# Patient Record
Sex: FEMALE | Race: WHITE | NOT HISPANIC OR LATINO | Employment: OTHER | URBAN - METROPOLITAN AREA
[De-identification: names, ages, dates, MRNs, and addresses within clinical notes are randomized per-mention and may not be internally consistent; named-entity substitution may affect disease eponyms.]

---

## 2017-01-10 ENCOUNTER — OFFICE VISIT (OUTPATIENT)
Dept: CARDIOLOGY | Facility: CLINIC | Age: 72
End: 2017-01-10

## 2017-01-10 VITALS
BODY MASS INDEX: 45.69 KG/M2 | HEART RATE: 59 BPM | DIASTOLIC BLOOD PRESSURE: 84 MMHG | HEIGHT: 61 IN | SYSTOLIC BLOOD PRESSURE: 178 MMHG | WEIGHT: 242 LBS

## 2017-01-10 DIAGNOSIS — I10 ESSENTIAL HYPERTENSION: ICD-10-CM

## 2017-01-10 DIAGNOSIS — E78.5 DYSLIPIDEMIA: ICD-10-CM

## 2017-01-10 DIAGNOSIS — G47.30 SLEEP APNEA, UNSPECIFIED TYPE: ICD-10-CM

## 2017-01-10 DIAGNOSIS — I65.21 STENOSIS OF RIGHT CAROTID ARTERY: ICD-10-CM

## 2017-01-10 DIAGNOSIS — I25.10 CORONARY ARTERY DISEASE INVOLVING NATIVE CORONARY ARTERY OF NATIVE HEART WITHOUT ANGINA PECTORIS: Primary | ICD-10-CM

## 2017-01-10 PROCEDURE — 93000 ELECTROCARDIOGRAM COMPLETE: CPT | Performed by: INTERNAL MEDICINE

## 2017-01-10 PROCEDURE — 99214 OFFICE O/P EST MOD 30 MIN: CPT | Performed by: INTERNAL MEDICINE

## 2017-01-10 RX ORDER — HYDROCHLOROTHIAZIDE 12.5 MG/1
12.5 CAPSULE, GELATIN COATED ORAL DAILY
Qty: 90 CAPSULE | Refills: 3 | Status: SHIPPED | OUTPATIENT
Start: 2017-01-10 | End: 2017-05-24 | Stop reason: SINTOL

## 2017-01-10 RX ORDER — LOSARTAN POTASSIUM 100 MG/1
50 TABLET ORAL DAILY
Qty: 90 TABLET | Refills: 3 | Status: SHIPPED | OUTPATIENT
Start: 2017-01-10 | End: 2018-01-16

## 2017-01-10 NOTE — PROGRESS NOTES
Date of Office Visit: 01/10/2017  Encounter Provider: Anastasiia Callahan MD  Place of Service: [facility] CARDIOLOGY  Patient Name: Julee King  :1945      Patient ID:  Julee King is a 71 y.o. female is here for  followup for CAD.       History of Present Illness    She came in with midsternal chest pain 2014 to [facility]. She had known history  of hypertension, hyperlipidemia, and had had a history of mild obstructive sleep apnea,  but was not using a CPAP machine. She was exercising at the [facility] 3 times a week and when  she would get on the treadmill she would notice this chest tightness. When we saw her in  the chest pain unit her blood pressure was a bit elevated. We ordered a couple of studies.  She had a 2-D echocardiogram with Doppler done 2014 showing ejection fraction of 65%  with grade I diastolic dysfunction and mild left ventricular hypertrophy. She also had a  PET stress study, which was done 2014, which showed no evidence of ischemia. We then  made a referral to the [facility] at [facility]. She was found  to have obstructive sleep apnea, which was moderate to severe, with recommendation of a  CPAP, which she is currently using. She says she is having some difficulty tolerating it,  but she is going to try to stick with it.      She then presented to the hospital 2016 with chest pain and pressure. She was severely anemic and found to have gastrointestinal bleeding. She did receive an transfusions. Her troponin was elevated at 4. Her echocardiogram showed left ventricular ejection fraction of 33% with mild to moderate mitral insufficiency. She went on to have a cardiac catheterization done which showed 80% distal left main stenosis, 60% distal first obtuse marginal stenosis, 30% ramus mid vessel stenosis, 30% proximal LAD stenosis, 100% occluded first diagonal branch, 70% mid LAD stenosis, 100% proximal RCA  stenosis and 95% distal RCA stenosis. She went on to have coronary bypass grafting 1/22/2016. She received LIMA to LAD, SVG to obtuse marginal 1, SVG to obtuse marginal 2 and SVG to LV branch. She also had endoscopy Dr. Senior done 1/24/2016 showing small hiatal hernia, normal stomach and duodenal. She still needs a lower endoscopy done at this time.     Her carotid study done 1/25/2016 shows 16-49% stenosis of left internal carotid artery. The right internal carotid artery is patent.     She sees Dr. Galaviz for LOVE. She has a colonoscopy 6/2016 with Dr. Rivera and it was good.      She is overall doing well.  She has notices some left lower extremity edema, and sometimes it gets sore.  She is not exercising.  She has had no tachycardia, dizziness or syncope.  She has no real difficulty breathing except for when she really pushes herself and she has no exertional chest tightness.  She has had no syncope or falls.  She has had no further gastrointestinal bleeding.  Her blood pressure is quite high today.  She has not seen Dr. Lee recently, but is due to see him.          Past Medical History   Diagnosis Date   • Anemia    • Bronchitis 05/2016   • Chest pain    • Dyslipidemia    • Hypertension    • Hypocalcemia    • Hypocalcemia    • Hypokalemia    • Obesity    • LOVE (obstructive sleep apnea)    • Palpitations    • Sleep apnea          Past Surgical History   Procedure Laterality Date   • Cholecystectomy     • Other surgical history       KNEE REPLACEMENT   • Other surgical history       TREATMENT OF ANKLE FRACTURE   • Other surgical history       TREATMENT FRACTURE OF THE HAND   • Coronary artery bypass graft  02/28/2016     CABG x4  Dr Monroy   • Bypass graft     • Colonoscopy N/A 6/8/2016     Procedure: COLONOSCOPY to cecum with cold biopsy polypectomies;  Surgeon: Alvarado Rivera MD;  Location: [facility] ENDOSCOPY;  Service:    • Endoscopy N/A 6/8/2016     Procedure: ESOPHAGOGASTRODUODENOSCOPY with biopsy;  Surgeon:  Alvarado Rivera MD;  Location: [facility] ENDOSCOPY;  Service:        Current Outpatient Prescriptions on File Prior to Visit   Medication Sig Dispense Refill   • albuterol (PROVENTIL HFA;VENTOLIN HFA) 108 (90 BASE) MCG/ACT inhaler Inhale 2 puffs every 4 (four) hours as needed for wheezing. 1 inhaler 11   • aspirin 81 MG tablet Take 1 tablet by mouth daily.     • atorvastatin (LIPITOR) 40 MG tablet Take 1 tablet by mouth daily. 90 tablet 3   • carvedilol (COREG) 12.5 MG tablet Take 1 tablet by mouth 2 (two) times a day. 180 tablet 3   • losartan (COZAAR) 50 MG tablet Take 1 tablet by mouth Daily. 90 tablet 3   • pantoprazole (PROTONIX) 40 MG EC tablet Take 40 mg by mouth daily.       No current facility-administered medications on file prior to visit.        Social History     Social History   • Marital status:      Spouse name: N/A   • Number of children: N/A   • Years of education: N/A     Occupational History   • Not on file.     Social History Main Topics   • Smoking status: Never Smoker   • Smokeless tobacco: Never Used      Comment: caffeine use   • Alcohol use Yes      Comment: OCCASIONALLY   • Drug use: No      Comment: CAFFEINE USE    • Sexual activity: Not on file     Other Topics Concern   • Not on file     Social History Narrative           Review of Systems   Constitution: Negative.   HENT: Negative for congestion and headaches.    Eyes: Negative for vision loss in left eye and vision loss in right eye.   Respiratory: Negative.  Negative for cough, hemoptysis, shortness of breath, sleep disturbances due to breathing, snoring, sputum production and wheezing.    Endocrine: Negative.    Hematologic/Lymphatic: Negative.    Skin: Negative for poor wound healing and rash.   Musculoskeletal: Negative for falls, gout, muscle cramps and myalgias.   Gastrointestinal: Negative for abdominal pain, diarrhea, dysphagia, hematemesis, melena, nausea and vomiting.   Neurological: Negative for excessive daytime  "sleepiness, dizziness, light-headedness, loss of balance, seizures and vertigo.   Psychiatric/Behavioral: Negative for depression and substance abuse. The patient is not nervous/anxious.        Procedures    ECG 12 Lead  Date/Time: 1/10/2017 12:43 PM  Performed by: AIDEN CROWE  Authorized by: AIDEN CROWE   Comparison: compared with previous ECG   Similar to previous ECG  Rhythm: sinus rhythm  T depression: V3, V4, V5 and V6  Clinical impression: abnormal ECG               Objective:      Vitals:    01/10/17 1207   BP: 178/84   BP Location: Right arm   Patient Position: Sitting   Pulse: 59   Weight: 242 lb (110 kg)   Height: 61\" (154.9 cm)     Body mass index is 45.73 kg/(m^2).    Physical Exam   Constitutional: She is oriented to person, place, and time. She appears well-developed and well-nourished. No distress.   obese   HENT:   Head: Normocephalic and atraumatic.   Eyes: Conjunctivae are normal. No scleral icterus.   Neck: Neck supple. No JVD present. Carotid bruit is not present. No thyromegaly present.   Cardiovascular: Normal rate, regular rhythm, S1 normal, S2 normal, normal heart sounds and intact distal pulses.   No extrasystoles are present. PMI is not displaced.  Exam reveals no gallop.    No murmur heard.  Pulses:       Carotid pulses are 2+ on the right side, and 2+ on the left side.       Radial pulses are 2+ on the right side, and 2+ on the left side.        Dorsalis pedis pulses are 2+ on the right side, and 2+ on the left side.        Posterior tibial pulses are 2+ on the right side, and 2+ on the left side.   1+ LLE edema   Pulmonary/Chest: Effort normal and breath sounds normal. No respiratory distress. She has no wheezes. She has no rhonchi. She has no rales. She exhibits no tenderness.   Abdominal: Soft. Bowel sounds are normal. She exhibits no distension, no abdominal bruit and no mass. There is no tenderness.   Musculoskeletal: She exhibits no edema or deformity. "   Lymphadenopathy:     She has no cervical adenopathy.   Neurological: She is alert and oriented to person, place, and time. No cranial nerve deficit.   Skin: Skin is warm and dry. No rash noted. She is not diaphoretic. No cyanosis. No pallor. Nails show no clubbing.   Psychiatric: She has a normal mood and affect. Judgment normal.   Vitals reviewed.      Lab Review:       Assessment:      Diagnosis Plan   1. Coronary artery disease involving native coronary artery of native heart without angina pectoris     2. Essential hypertension     3. Sleep apnea, unspecified type     4. Dyslipidemia        1. Coronary artery bypass grafting in 01/2016.    2. Gastrointestinal bleeding in 01/2016.    3. Hypertension.    4. Hyperlipidemia.    5. Obstructive sleep apnea.    6. Right internal carotid artery stenosis, mild, in 01/2016.           Plan:       See back in 6 months with kaylin vessels. Increase cozaar to 100mg daily and start hctz 12.5mg daily.    Coronary Artery Disease  Assessment  • The patient has no angina    Subjective - Objective  • There is a history of previous coronary artery bypass graft  • Current antiplatelet therapy includes aspirin 81 mg

## 2017-01-13 RX ORDER — ATORVASTATIN CALCIUM 40 MG/1
TABLET, FILM COATED ORAL
Qty: 90 TABLET | Refills: 2 | Status: SHIPPED | OUTPATIENT
Start: 2017-01-13 | End: 2017-10-10 | Stop reason: SDUPTHER

## 2017-01-13 RX ORDER — PANTOPRAZOLE SODIUM 40 MG/1
TABLET, DELAYED RELEASE ORAL
Qty: 90 TABLET | Refills: 2 | Status: SHIPPED | OUTPATIENT
Start: 2017-01-13 | End: 2019-12-09

## 2017-01-16 ENCOUNTER — HOSPITAL ENCOUNTER (OUTPATIENT)
Dept: CARDIOLOGY | Facility: HOSPITAL | Age: 72
Discharge: HOME | End: 2017-01-16
Attending: INTERNAL MEDICINE | Admitting: INTERNAL MEDICINE

## 2017-01-16 DIAGNOSIS — I25.10 CORONARY ARTERY DISEASE INVOLVING NATIVE CORONARY ARTERY OF NATIVE HEART WITHOUT ANGINA PECTORIS: ICD-10-CM

## 2017-01-16 DIAGNOSIS — I65.21 STENOSIS OF RIGHT CAROTID ARTERY: ICD-10-CM

## 2017-01-16 PROCEDURE — 93880 EXTRACRANIAL BILAT STUDY: CPT | Performed by: INTERNAL MEDICINE

## 2017-01-16 PROCEDURE — 93880 EXTRACRANIAL BILAT STUDY: CPT

## 2017-01-17 ENCOUNTER — TELEPHONE (OUTPATIENT)
Dept: CARDIOLOGY | Facility: CLINIC | Age: 72
End: 2017-01-17

## 2017-01-17 LAB
BH CV XLRA MEAS LEFT DIST CCA EDV: 19.5 CM/SEC
BH CV XLRA MEAS LEFT DIST CCA PSV: 86.6 CM/SEC
BH CV XLRA MEAS LEFT DIST ICA EDV: -16.5 CM/SEC
BH CV XLRA MEAS LEFT DIST ICA PSV: -55.2 CM/SEC
BH CV XLRA MEAS LEFT ICA/CCA RATIO: 1.2
BH CV XLRA MEAS LEFT MID ICA EDV: -24.4 CM/SEC
BH CV XLRA MEAS LEFT MID ICA PSV: -101.2 CM/SEC
BH CV XLRA MEAS LEFT PROX ECA PSV: -79.3 CM/SEC
BH CV XLRA MEAS LEFT PROX ICA EDV: 24.4 CM/SEC
BH CV XLRA MEAS LEFT PROX ICA PSV: 91.5 CM/SEC
BH CV XLRA MEAS LEFT PROX SCLA PSV: 182.5 CM/SEC
BH CV XLRA MEAS RIGHT DIST CCA EDV: 22.7 CM/SEC
BH CV XLRA MEAS RIGHT DIST CCA PSV: 138 CM/SEC
BH CV XLRA MEAS RIGHT DIST ICA EDV: -32.9 CM/SEC
BH CV XLRA MEAS RIGHT DIST ICA PSV: -92.5 CM/SEC
BH CV XLRA MEAS RIGHT ICA/CCA RATIO: 0.7
BH CV XLRA MEAS RIGHT MID ICA EDV: -19.6 CM/SEC
BH CV XLRA MEAS RIGHT MID ICA PSV: -84.7 CM/SEC
BH CV XLRA MEAS RIGHT PROX ECA PSV: -75.3 CM/SEC
BH CV XLRA MEAS RIGHT PROX ICA EDV: 22 CM/SEC
BH CV XLRA MEAS RIGHT PROX ICA PSV: 81.5 CM/SEC
BH CV XLRA MEAS RIGHT PROX SCLA PSV: -217.1 CM/SEC

## 2017-01-18 ENCOUNTER — TELEPHONE (OUTPATIENT)
Dept: CARDIOLOGY | Facility: CLINIC | Age: 72
End: 2017-01-18

## 2017-01-18 NOTE — TELEPHONE ENCOUNTER
KERRIE: S/w pt and informed her that last visit 1/10/2017 when she saw  you that you increased her losartan 50 mg daily to 100 mg daily. Verbally understood by the pt that she need to take Losartan 100 mg daily....    See message below    Nya FENG

## 2017-01-18 NOTE — TELEPHONE ENCOUNTER
----- Message from Laurence Rose RN sent at 1/17/2017  4:18 PM EST -----  Regarding: FW: Prescription Question  Contact: [phone]      ----- Message -----     From: Julee King     Sent: 1/17/2017  12:24 PM       To: Tila Meza [facility]  Subject: Prescription Question                            I WAS ON LOSARTAN TABS 50MG 1 TABLET DAILY  IT WASCHANGED TO LOSARTAN TABS 100MG HALF(1/2) DAILY  WHAT IS THE DIFFERENCE  IT SEEMS IF YOU HALF THE PILL IT IS THE SAME AS THE 50MG

## 2017-01-24 ENCOUNTER — TELEPHONE (OUTPATIENT)
Dept: FAMILY MEDICINE CLINIC | Facility: CLINIC | Age: 72
End: 2017-01-24

## 2017-01-24 ENCOUNTER — TELEPHONE (OUTPATIENT)
Dept: CARDIOLOGY | Facility: CLINIC | Age: 72
End: 2017-01-24

## 2017-01-24 NOTE — TELEPHONE ENCOUNTER
Patient would like the results of the carotid u/s done 1/17/17.  Results are in EPIC. / CRISSY    Patient's phone number is [phone]

## 2017-01-24 NOTE — TELEPHONE ENCOUNTER
Pt was seen on 1/10/17. She had a episode on Saturday where her b/p dropped (107/50) and she passed out. You increased her cozaar to 100 MG on 1/10/2017. Please Advise Pt#: [phone]    Thanks  Nian

## 2017-01-24 NOTE — TELEPHONE ENCOUNTER
Pt is informed of the carotid testing and she is aware to increase her losartan to 1/2 daily    Thanks  Nina

## 2017-01-24 NOTE — TELEPHONE ENCOUNTER
----- Message from Biju Richards MD sent at 1/24/2017 10:31 AM EST -----  Contact: PATIENT [phone]  Patient worked in whenever  ----- Message -----     From: Ira Okeefe MA     Sent: 1/24/2017   9:50 AM       To: Biju Richards MD    NO ONE HAS OPENS NOT EVEN [id number]  ----- Message -----     From: Biju Richards MD     Sent: 1/24/2017   9:36 AM       To: Ira Okeefe MA    See if nurse practitioner will see her  ----- Message -----     From: Kimberly Biswas MA     Sent: 1/23/2017   1:40 PM       To: Biju Richards MD        ----- Message -----     From: Cynthia Dias MA     Sent: 1/23/2017  11:57 AM       To: Ira Okeefe MA    We have nothing available do you gUys?  ----- Message -----     From: Natalia Young     Sent: 1/23/2017   9:22 AM       To: Kimberly Biswas MA    PATIENT HAS PASSED OUT ON Saturday  AND BLOOD PRESSURE WAS HIGH. EMT HAD CAME TO PATIENTS HOUSE AND BLOOD PRESSURE FINALLY WENT DOWN SOME. PATIENT DIDN'T GO TO ER. EMT TOLD HER TO GET APPOINTMENT WITH HER DRMushtaq WHICH IS DR. GRAVES AND SINCE HE'S OUT THIS WEEK. CAN YOU SEE HER THIS WEEK?      Pt informed re: ICC/ER today f/u gwd and could call for cancelations during week with someone.

## 2017-02-01 ENCOUNTER — PATIENT OUTREACH (OUTPATIENT)
Dept: CASE MANAGEMENT | Facility: OTHER | Age: 72
End: 2017-02-01

## 2017-02-02 ENCOUNTER — OFFICE VISIT (OUTPATIENT)
Dept: FAMILY MEDICINE CLINIC | Facility: CLINIC | Age: 72
End: 2017-02-02

## 2017-02-02 VITALS
DIASTOLIC BLOOD PRESSURE: 66 MMHG | TEMPERATURE: 98.1 F | SYSTOLIC BLOOD PRESSURE: 138 MMHG | HEART RATE: 68 BPM | OXYGEN SATURATION: 97 % | WEIGHT: 241 LBS | BODY MASS INDEX: 45.5 KG/M2 | HEIGHT: 61 IN

## 2017-02-02 DIAGNOSIS — I10 ESSENTIAL HYPERTENSION: ICD-10-CM

## 2017-02-02 DIAGNOSIS — J06.9 UPPER RESPIRATORY INFECTION, VIRAL: ICD-10-CM

## 2017-02-02 DIAGNOSIS — E78.5 DYSLIPIDEMIA: Primary | ICD-10-CM

## 2017-02-02 PROCEDURE — 99213 OFFICE O/P EST LOW 20 MIN: CPT | Performed by: FAMILY MEDICINE

## 2017-02-02 RX ORDER — ALBUTEROL SULFATE 90 UG/1
2 AEROSOL, METERED RESPIRATORY (INHALATION) EVERY 4 HOURS PRN
Qty: 1 INHALER | Refills: 11 | Status: SHIPPED | OUTPATIENT
Start: 2017-02-02 | End: 2019-06-11 | Stop reason: HOSPADM

## 2017-02-02 NOTE — PROGRESS NOTES
SUBJECTIVE:  The patient is a 71-year-old white female who comes in for follow-up on her blood pressure.  She complains of slight cough and congestion over the last 3 days.  No fever or chills.  She has a history of coronary artery disease hypertension and hyperlipidemia.    PAST MEDICAL HISTORY:  Reviewed.    REVIEW OF SYSTEMS:  Please see above; 14 point ROS otherwise negative.      OBJECTIVE: Vitals signs are reviewed and are stable.    HEENT: ROLANDA.   Road in TMs look okay  Neck:  Supple.    Lungs:  Clear.    Heart:  Regular rate and rhythm.    Abdomen:   Soft, nontender.    Extremities:  No cyanosis, clubbing or edema.      ASSESSMENT:     Coronary artery disease  Hypertension  Hyperlipidemia  Upper respiratory infection      PLAN: ] Her blood pressures are reviewed and are acceptable.  She will continue to monitor.  Fasting CMP and lipid profile are ordered.  She will follow up on these.  She will try Mucinex and plain Robitussin for her respiratory infection.  Should this worsen she will let me know.  Diet and exercise are discussed.    Much of this encounter note is an electronic transcription/translation of spoken language to printed text.  The electronic translation of spoken language may permit erroneous, or at times, nonsensical words or phrases to be inadvertently transcribed.  Although I have reviewed the note for such errors, some may still exist.

## 2017-02-08 LAB
ALBUMIN SERPL-MCNC: 3.5 G/DL (ref 3.5–5.2)
ALBUMIN/GLOB SERPL: 1.1 G/DL
ALP SERPL-CCNC: 117 U/L (ref 39–117)
ALT SERPL W P-5'-P-CCNC: 11 U/L (ref 1–33)
ANION GAP SERPL CALCULATED.3IONS-SCNC: 11.8 MMOL/L
AST SERPL-CCNC: 13 U/L (ref 1–32)
BILIRUB SERPL-MCNC: 0.2 MG/DL (ref 0.1–1.2)
BUN BLD-MCNC: 24 MG/DL (ref 8–23)
BUN/CREAT SERPL: 28.6 (ref 7–25)
CALCIUM SPEC-SCNC: 9.2 MG/DL (ref 8.6–10.5)
CHLORIDE SERPL-SCNC: 108 MMOL/L (ref 98–107)
CHOLEST SERPL-MCNC: 119 MG/DL (ref 0–200)
CO2 SERPL-SCNC: 25.2 MMOL/L (ref 22–29)
CREAT BLD-MCNC: 0.84 MG/DL (ref 0.57–1)
GFR SERPL CREATININE-BSD FRML MDRD: 67 ML/MIN/1.73
GLOBULIN UR ELPH-MCNC: 3.3 GM/DL
GLUCOSE BLD-MCNC: 138 MG/DL (ref 65–99)
HDLC SERPL-MCNC: 40 MG/DL (ref 40–60)
LDLC SERPL CALC-MCNC: 60 MG/DL (ref 0–100)
LDLC/HDLC SERPL: 1.49 {RATIO}
POTASSIUM BLD-SCNC: 4.6 MMOL/L (ref 3.5–5.2)
PROT SERPL-MCNC: 6.8 G/DL (ref 6–8.5)
SODIUM BLD-SCNC: 145 MMOL/L (ref 136–145)
TRIGL SERPL-MCNC: 97 MG/DL (ref 0–150)
VLDLC SERPL-MCNC: 19.4 MG/DL (ref 5–40)

## 2017-02-08 PROCEDURE — 80061 LIPID PANEL: CPT | Performed by: FAMILY MEDICINE

## 2017-02-08 PROCEDURE — 80053 COMPREHEN METABOLIC PANEL: CPT | Performed by: FAMILY MEDICINE

## 2017-05-22 RX ORDER — CARVEDILOL 12.5 MG/1
TABLET ORAL
Qty: 180 TABLET | Refills: 2 | Status: SHIPPED | OUTPATIENT
Start: 2017-05-22 | End: 2018-02-16 | Stop reason: SDUPTHER

## 2017-05-24 ENCOUNTER — OFFICE VISIT (OUTPATIENT)
Dept: FAMILY MEDICINE CLINIC | Facility: CLINIC | Age: 72
End: 2017-05-24

## 2017-05-24 VITALS
BODY MASS INDEX: 46.67 KG/M2 | HEIGHT: 61 IN | WEIGHT: 247.2 LBS | OXYGEN SATURATION: 96 % | HEART RATE: 59 BPM | SYSTOLIC BLOOD PRESSURE: 118 MMHG | TEMPERATURE: 98.1 F | RESPIRATION RATE: 16 BRPM | DIASTOLIC BLOOD PRESSURE: 58 MMHG

## 2017-05-24 DIAGNOSIS — R73.9 ELEVATED BLOOD SUGAR: ICD-10-CM

## 2017-05-24 DIAGNOSIS — B02.9 HERPES ZOSTER WITHOUT COMPLICATION: ICD-10-CM

## 2017-05-24 DIAGNOSIS — Z00.00 HEALTHCARE MAINTENANCE: ICD-10-CM

## 2017-05-24 DIAGNOSIS — Z00.00 MEDICARE ANNUAL WELLNESS VISIT, INITIAL: Primary | ICD-10-CM

## 2017-05-24 LAB
ANION GAP SERPL CALCULATED.3IONS-SCNC: 12.8 MMOL/L
BUN BLD-MCNC: 14 MG/DL (ref 8–23)
BUN/CREAT SERPL: 18.2 (ref 7–25)
CALCIUM SPEC-SCNC: 9 MG/DL (ref 8.6–10.5)
CHLORIDE SERPL-SCNC: 101 MMOL/L (ref 98–107)
CO2 SERPL-SCNC: 24.2 MMOL/L (ref 22–29)
CREAT BLD-MCNC: 0.77 MG/DL (ref 0.57–1)
GFR SERPL CREATININE-BSD FRML MDRD: 74 ML/MIN/1.73
GLUCOSE BLD-MCNC: 99 MG/DL (ref 65–99)
HBA1C MFR BLD: 5.78 % (ref 4.8–5.6)
POTASSIUM BLD-SCNC: 4.3 MMOL/L (ref 3.5–5.2)
SODIUM BLD-SCNC: 138 MMOL/L (ref 136–145)

## 2017-05-24 PROCEDURE — 99213 OFFICE O/P EST LOW 20 MIN: CPT | Performed by: NURSE PRACTITIONER

## 2017-05-24 PROCEDURE — G0438 PPPS, INITIAL VISIT: HCPCS | Performed by: NURSE PRACTITIONER

## 2017-05-24 PROCEDURE — 83036 HEMOGLOBIN GLYCOSYLATED A1C: CPT | Performed by: NURSE PRACTITIONER

## 2017-05-24 PROCEDURE — 80048 BASIC METABOLIC PNL TOTAL CA: CPT | Performed by: NURSE PRACTITIONER

## 2017-05-24 RX ORDER — FAMCICLOVIR 500 MG/1
500 TABLET ORAL 3 TIMES DAILY
Qty: 21 TABLET | Refills: 0 | Status: SHIPPED | OUTPATIENT
Start: 2017-05-24 | End: 2017-07-13

## 2017-05-25 ENCOUNTER — TELEPHONE (OUTPATIENT)
Dept: FAMILY MEDICINE CLINIC | Facility: CLINIC | Age: 72
End: 2017-05-25

## 2017-07-13 ENCOUNTER — OFFICE VISIT (OUTPATIENT)
Dept: CARDIOLOGY | Facility: CLINIC | Age: 72
End: 2017-07-13

## 2017-07-13 VITALS
WEIGHT: 250 LBS | HEIGHT: 61 IN | SYSTOLIC BLOOD PRESSURE: 120 MMHG | DIASTOLIC BLOOD PRESSURE: 68 MMHG | BODY MASS INDEX: 47.2 KG/M2 | HEART RATE: 68 BPM

## 2017-07-13 DIAGNOSIS — R06.02 SHORTNESS OF BREATH: ICD-10-CM

## 2017-07-13 DIAGNOSIS — I10 ESSENTIAL HYPERTENSION: ICD-10-CM

## 2017-07-13 DIAGNOSIS — I42.9 CARDIOMYOPATHY (HCC): Primary | ICD-10-CM

## 2017-07-13 DIAGNOSIS — I25.10 CORONARY ARTERY DISEASE INVOLVING NATIVE CORONARY ARTERY OF NATIVE HEART WITHOUT ANGINA PECTORIS: ICD-10-CM

## 2017-07-13 PROCEDURE — 99214 OFFICE O/P EST MOD 30 MIN: CPT | Performed by: NURSE PRACTITIONER

## 2017-07-13 PROCEDURE — 93000 ELECTROCARDIOGRAM COMPLETE: CPT | Performed by: NURSE PRACTITIONER

## 2017-07-13 NOTE — PROGRESS NOTES
Date of Office Visit: 2017  Encounter Provider: MANDIE Overton  Place of Service: [facility] CARDIOLOGY  Patient Name: Julee King  :1945    Chief Complaint   Patient presents with   • Coronary Artery Disease   :     HPI: Julee King is a 72 y.o. female comes in today for 6 month follow-up.  She is a patient of Dr. Callahan.  I'm seeing her for the first time today.  She has a history of coronary disease, palpitations and sleep apnea.   she had a negative PET stress study.  , she was admitted with chest pain and pressure.  She was anemic and had GI bleeding.  Her troponin was elevated.  Her EF was noted to be 33% with mild to moderate mitral insufficiency.  A cardiac catheter was performed showing an 80% distal left main, 60% distal first obtuse marginal stenosis, 30% ramus mid vessel stenosis, 30% proximal LAD stenosis, 100% occluded first diagonal branch, 70% mid LAD stenosis, 100% proximal RCA stenosis and 95% distal RCA stenosis.  She went on to have CABG 2016 with LIMA to LAD, SVG to obtuse marginal 1, SVG to obtuse marginal 2 and SVG to LV branch.    2017 she had a normal bilateral carotid duplex.      In May 2017, she had some numbness due to shingles and had a fall.     Today, she comes in reporting that she has been feeling well over the 6 months.  She denies any palpitations or tachycardia.  She does feel her heart rate is low at times.  She does bring a record of her blood pressure showing it to be 106 over 50s at times.  She has some mild edema of her left leg.  She complains of shortness of breath on exertion.  She denies chest pain, orthopnea or PND.  She wears her sleep apnea machine but she does not like it.  She said she was on hydrochlorothiazide in the past that caused her to pass out.    Past Medical History:   Diagnosis Date   • Anemia    • Bronchitis 2016   • Chest pain    • Dyslipidemia    • Hypertension     • Hypocalcemia    • Hypokalemia    • Obesity    • LOVE (obstructive sleep apnea)    • Palpitations    • Sleep apnea        Past Surgical History:   Procedure Laterality Date   • BYPASS GRAFT     • CHOLECYSTECTOMY     • COLONOSCOPY N/A 6/8/2016    Procedure: COLONOSCOPY to cecum with cold biopsy polypectomies;  Surgeon: Alvarado Rivera MD;  Location: [facility] ENDOSCOPY;  Service:    • CORONARY ARTERY BYPASS GRAFT  02/28/2016    CABG x4  Dr Mnoroy   • ENDOSCOPY N/A 6/8/2016    Procedure: ESOPHAGOGASTRODUODENOSCOPY with biopsy;  Surgeon: Alvarado Rivera MD;  Location: [facility] ENDOSCOPY;  Service:    • OTHER SURGICAL HISTORY      KNEE REPLACEMENT   • OTHER SURGICAL HISTORY      TREATMENT OF ANKLE FRACTURE   • OTHER SURGICAL HISTORY      TREATMENT FRACTURE OF THE HAND           Review of Systems   Constitution: Negative for fever and malaise/fatigue.   HENT: Negative for ear pain, hearing loss, nosebleeds and sore throat.    Eyes: Negative for double vision, pain, vision loss in left eye, vision loss in right eye and visual disturbance.   Cardiovascular: Positive for dyspnea on exertion and leg swelling. Negative for claudication and orthopnea.   Respiratory: Positive for shortness of breath. Negative for cough, snoring and wheezing.    Endocrine: Negative for cold intolerance, heat intolerance and polyuria.   Skin: Negative for color change, itching and rash.   Musculoskeletal: Negative for joint pain, joint swelling and muscle cramps.   Gastrointestinal: Negative for abdominal pain, diarrhea, melena, nausea and vomiting.   Genitourinary: Negative for bladder incontinence and hematuria.   Neurological: Negative for excessive daytime sleepiness, dizziness, light-headedness, paresthesias and seizures.   Psychiatric/Behavioral: Negative for depression. The patient is not nervous/anxious.    All other systems reviewed and are negative.    All other systems reviewed and are negative    Allergies   Allergen Reactions   • Codeine  "     \"makes me loopy\"   • Iodinated Diagnostic Agents      itching       All aspects of family and social history reviewed.          Objective:     Vitals:    07/13/17 1411   BP: 120/68   BP Location: Left arm   Cuff Size: Large Adult   Pulse: 68   Weight: 250 lb (113 kg)   Height: 61\" (154.9 cm)     Body mass index is 47.24 kg/(m^2).    PHYSICAL EXAM:  Physical Exam   Constitutional: She is oriented to person, place, and time. She appears well-developed and well-nourished. No distress.   obese   HENT:   Head: Normocephalic and atraumatic.   Eyes: Conjunctivae are normal. No scleral icterus.   Neck: Neck supple. No JVD present. Carotid bruit is not present. No thyromegaly present.   Cardiovascular: Normal rate, regular rhythm, S1 normal, S2 normal, normal heart sounds and intact distal pulses.   No extrasystoles are present. PMI is not displaced.    No murmur heard.  Pulses:       Carotid pulses are 2+ on the right side, and 2+ on the left side.       Radial pulses are 2+ on the right side, and 2+ on the left side.        Dorsalis pedis pulses are 2+ on the right side, and 2+ on the left side.        Posterior tibial pulses are 2+ on the right side, and 2+ on the left side.   1+ LLE edema   Pulmonary/Chest: Effort normal and breath sounds normal. No respiratory distress. She has no wheezes. She has no rhonchi. She has no rales. She exhibits no tenderness.   Abdominal: Soft. Bowel sounds are normal. She exhibits no distension, no abdominal bruit and no mass. There is no tenderness.   Musculoskeletal: She exhibits no edema or deformity.   Lymphadenopathy:     She has no cervical adenopathy.   Neurological: She is alert and oriented to person, place, and time. No cranial nerve deficit.   Skin: Skin is warm and dry. No rash noted. She is not diaphoretic. No cyanosis. No pallor. Nails show no clubbing.   Psychiatric: She has a normal mood and affect. Judgment normal.   Vitals reviewed.        ECG 12 Lead  Date/Time: " 7/13/2017 3:09 PM  Performed by: MARZENA RICH  Authorized by: MARZENA RICH   Comparison: compared with previous ECG from 1/10/2017  Similar to previous ECG  Rhythm: sinus rhythm  Rate: normal  BPM: 68  Conduction: conduction normal  ST Segments: ST segments normal  T flattening: all  QRS axis: normal  Other findings: LVH  Clinical impression: abnormal ECG  Comments: Non specific ST-T wave changes throughout.   Indication: CAD                Assessment:       Diagnosis Plan   1. Cardiomyopathy  Adult Transthoracic Echo Complete   2. Shortness of breath  Adult Transthoracic Echo Complete   3. Coronary artery disease involving native coronary artery of native heart without angina pectoris     4. Essential hypertension          Orders Placed This Encounter   Procedures   • Adult Transthoracic Echo Complete     Standing Status:   Future     Order Specific Question:   Reason for exam?     Answer:   Heart Failure, Cardiomyopathy, or Hypertension     Order Specific Question:   Reason for exam?     Answer:   Heart Failure, Cardiomyopathy, or Sytemic or Pulmonary Hypertension     Order Specific Question:   Hypertension, Heart Failure, or Cardiomyopathy specification?     Answer:   Known Heart Failure     Order Specific Question:   Hypertension, Heart Failure, or Cardiomyopathy specification?     Answer:   Known Cardiomyopathy     Order Specific Question:   Change in clinical status, cardiac exam, or medical therapy?     Answer:   Yes     Comments:   more short of breath     Order Specific Question:   Change in clinical status, cardiac exam, or medical therapy?     Answer:   Yes       Current Outpatient Prescriptions   Medication Sig Dispense Refill   • albuterol (PROVENTIL HFA;VENTOLIN HFA) 108 (90 BASE) MCG/ACT inhaler Inhale 2 puffs Every 4 (Four) Hours As Needed for wheezing. 1 inhaler 11   • aspirin 81 MG tablet Take 1 tablet by mouth daily.     • atorvastatin (LIPITOR) 40 MG tablet TAKE 1 TABLET DAILY 90 tablet  2   • carvedilol (COREG) 12.5 MG tablet TAKE 1 TABLET TWICE A  tablet 2   • losartan (COZAAR) 100 MG tablet Take 0.5 tablets by mouth Daily. 90 tablet 3   • pantoprazole (PROTONIX) 40 MG EC tablet TAKE 1 TABLET DAILY 90 tablet 2     No current facility-administered medications for this visit.             Plan:       1. Ischemic cardiomyopathy  Heart Failure  Assessment  • NYHA class III-A - There is limitation of physical activity. The patient is comfortable at rest, but ordinary activity causes fatigue, palpitations or shortness of breath.  • Beta blocker prescribed  • Angiotensin receptor blocker (ARB) prescribed  • Left ventricular function is moderately reduced by qualitative assessment    Plan  • The patient has received heart failure education on the following topics: dietary sodium restriction, physical activity and weight monitoring    Subjective/Objective  • The patient reports dyspnea  • Physical exam findings positive for peripheral edema.   • Physical exam findings negative for rales and elevated JVP.    Has a history of ischemic cardiomyopathy.  Last EF recorded at 33%.  She has not had a repeat echocardiogram since then.  She continues to have shortness of breath and feels like she has bronchitis.  She also has edema of her lower extremities.  She is very sedentary and obese.  These symptoms could be related to her obesity but I am going to check an echocardiogram to ensure that she has not had a further reduction of her ejection fraction.  She could benefit from addition of a low-dose diuretic.  We could even consider switching to Entresto if ejection fraction still less than 40%.    2. Coronary Artery Disease  Assessment  • The patient has no angina    Plan  • Lifestyle modifications discussed include adhering to a heart healthy diet, maintenance of a healthy weight, medication compliance, regular exercise and regular monitoring of cholesterol and blood pressure    Subjective - Objective  •  There is a history of previous coronary artery bypass graft  • Current antiplatelet therapy includes aspirin 81 mg    3. HTN-she currently takes her losartan 50 mg in the morning.  When asked to take this in the evening as she feels like she gets a little bit dizzy and hypotensive.  We could potentially changed to Entresto in the future.        Follow up in office in 6 months. Echo ordered    As always, it has been a pleasure to participate in this patient's care.      Sincerely,      MANDIE Overton

## 2017-07-24 ENCOUNTER — LAB (OUTPATIENT)
Dept: LAB | Facility: HOSPITAL | Age: 72
End: 2017-07-24
Attending: INTERNAL MEDICINE

## 2017-07-24 ENCOUNTER — TRANSCRIBE ORDERS (OUTPATIENT)
Dept: ADMINISTRATIVE | Facility: HOSPITAL | Age: 72
End: 2017-07-24

## 2017-07-24 ENCOUNTER — HOSPITAL ENCOUNTER (OUTPATIENT)
Dept: CARDIOLOGY | Facility: HOSPITAL | Age: 72
Discharge: HOME | End: 2017-07-24
Admitting: NURSE PRACTITIONER

## 2017-07-24 VITALS — BODY MASS INDEX: 47.2 KG/M2 | HEART RATE: 68 BPM | OXYGEN SATURATION: 99 % | HEIGHT: 61 IN | WEIGHT: 250 LBS

## 2017-07-24 DIAGNOSIS — K62.5 RECTAL BLEEDING: ICD-10-CM

## 2017-07-24 DIAGNOSIS — K62.5 RECTAL BLEEDING: Primary | ICD-10-CM

## 2017-07-24 DIAGNOSIS — I42.9 CARDIOMYOPATHY (HCC): ICD-10-CM

## 2017-07-24 DIAGNOSIS — R06.02 SHORTNESS OF BREATH: ICD-10-CM

## 2017-07-24 LAB
BASOPHILS # BLD AUTO: 0.02 10*3/MM3 (ref 0–0.2)
BASOPHILS NFR BLD AUTO: 0.3 % (ref 0–1.5)
BH CV ECHO MEAS - ACS: 2 CM
BH CV ECHO MEAS - AO MAX PG (FULL): 7.1 MMHG
BH CV ECHO MEAS - AO MAX PG: 9.2 MMHG
BH CV ECHO MEAS - AO MEAN PG (FULL): 3.3 MMHG
BH CV ECHO MEAS - AO MEAN PG: 4.3 MMHG
BH CV ECHO MEAS - AO ROOT AREA (BSA CORRECTED): 1.6
BH CV ECHO MEAS - AO ROOT AREA: 9.1 CM^2
BH CV ECHO MEAS - AO ROOT DIAM: 3.4 CM
BH CV ECHO MEAS - AO V2 MAX: 152 CM/SEC
BH CV ECHO MEAS - AO V2 MEAN: 90.4 CM/SEC
BH CV ECHO MEAS - AO V2 VTI: 35 CM
BH CV ECHO MEAS - AVA(I,A): 2 CM^2
BH CV ECHO MEAS - AVA(I,D): 2 CM^2
BH CV ECHO MEAS - AVA(V,A): 2 CM^2
BH CV ECHO MEAS - AVA(V,D): 2 CM^2
BH CV ECHO MEAS - BSA(HAYCOCK): 2.3 M^2
BH CV ECHO MEAS - BSA: 2.1 M^2
BH CV ECHO MEAS - BZI_BMI: 47.2 KILOGRAMS/M^2
BH CV ECHO MEAS - BZI_METRIC_HEIGHT: 154.9 CM
BH CV ECHO MEAS - BZI_METRIC_WEIGHT: 113.4 KG
BH CV ECHO MEAS - CONTRAST EF (2CH): 55.3 ML/M^2
BH CV ECHO MEAS - CONTRAST EF 4CH: 54.4 ML/M^2
BH CV ECHO MEAS - EDV(MOD-SP2): 85 ML
BH CV ECHO MEAS - EDV(MOD-SP4): 90 ML
BH CV ECHO MEAS - EDV(TEICH): 226.5 ML
BH CV ECHO MEAS - EF(CUBED): 75.6 %
BH CV ECHO MEAS - EF(MOD-SP2): 55.3 %
BH CV ECHO MEAS - EF(MOD-SP4): 54.4 %
BH CV ECHO MEAS - EF(TEICH): 66.3 %
BH CV ECHO MEAS - ESV(MOD-SP2): 38 ML
BH CV ECHO MEAS - ESV(MOD-SP4): 41 ML
BH CV ECHO MEAS - ESV(TEICH): 76.3 ML
BH CV ECHO MEAS - FS: 37.5 %
BH CV ECHO MEAS - IVS/LVPW: 1
BH CV ECHO MEAS - IVSD: 1.2 CM
BH CV ECHO MEAS - LAT PEAK E' VEL: 11 CM/SEC
BH CV ECHO MEAS - LV DIASTOLIC VOL/BSA (35-75): 43.3 ML/M^2
BH CV ECHO MEAS - LV MASS(C)D: 380.2 GRAMS
BH CV ECHO MEAS - LV MASS(C)DI: 183.1 GRAMS/M^2
BH CV ECHO MEAS - LV MAX PG: 2.1 MMHG
BH CV ECHO MEAS - LV MEAN PG: 0.98 MMHG
BH CV ECHO MEAS - LV SYSTOLIC VOL/BSA (12-30): 19.7 ML/M^2
BH CV ECHO MEAS - LV V1 MAX: 73.3 CM/SEC
BH CV ECHO MEAS - LV V1 MEAN: 43.1 CM/SEC
BH CV ECHO MEAS - LV V1 VTI: 16.5 CM
BH CV ECHO MEAS - LVIDD: 6.6 CM
BH CV ECHO MEAS - LVIDS: 4.1 CM
BH CV ECHO MEAS - LVLD AP2: 8 CM
BH CV ECHO MEAS - LVLD AP4: 7.7 CM
BH CV ECHO MEAS - LVLS AP2: 6.7 CM
BH CV ECHO MEAS - LVLS AP4: 6.8 CM
BH CV ECHO MEAS - LVOT AREA (M): 4.2 CM^2
BH CV ECHO MEAS - LVOT AREA: 4.2 CM^2
BH CV ECHO MEAS - LVOT DIAM: 2.3 CM
BH CV ECHO MEAS - LVPWD: 1.2 CM
BH CV ECHO MEAS - MED PEAK E' VEL: 8 CM/SEC
BH CV ECHO MEAS - MV A DUR: 0.11 SEC
BH CV ECHO MEAS - MV A MAX VEL: 57.4 CM/SEC
BH CV ECHO MEAS - MV DEC SLOPE: 312.3 CM/SEC^2
BH CV ECHO MEAS - MV DEC TIME: 0.22 SEC
BH CV ECHO MEAS - MV E MAX VEL: 69.4 CM/SEC
BH CV ECHO MEAS - MV E/A: 1.2
BH CV ECHO MEAS - MV MAX PG: 3.4 MMHG
BH CV ECHO MEAS - MV MEAN PG: 1.7 MMHG
BH CV ECHO MEAS - MV P1/2T MAX VEL: 65.6 CM/SEC
BH CV ECHO MEAS - MV P1/2T: 61.5 MSEC
BH CV ECHO MEAS - MV V2 MAX: 92.3 CM/SEC
BH CV ECHO MEAS - MV V2 MEAN: 62 CM/SEC
BH CV ECHO MEAS - MV V2 VTI: 28.2 CM
BH CV ECHO MEAS - MVA P1/2T LCG: 3.4 CM^2
BH CV ECHO MEAS - MVA(P1/2T): 3.6 CM^2
BH CV ECHO MEAS - MVA(VTI): 2.5 CM^2
BH CV ECHO MEAS - PA MAX PG (FULL): 1.5 MMHG
BH CV ECHO MEAS - PA MAX PG: 3 MMHG
BH CV ECHO MEAS - PA V2 MAX: 86.9 CM/SEC
BH CV ECHO MEAS - PULM A REVS DUR: 0.1 SEC
BH CV ECHO MEAS - PULM A REVS VEL: 31.3 CM/SEC
BH CV ECHO MEAS - PULM DIAS VEL: 36.8 CM/SEC
BH CV ECHO MEAS - PULM S/D: 1.4
BH CV ECHO MEAS - PULM SYS VEL: 52.3 CM/SEC
BH CV ECHO MEAS - PVA(V,A): 2.8 CM^2
BH CV ECHO MEAS - PVA(V,D): 2.8 CM^2
BH CV ECHO MEAS - QP/QS: 0.99
BH CV ECHO MEAS - RAP SYSTOLE: 3 MMHG
BH CV ECHO MEAS - RV MAX PG: 1.5 MMHG
BH CV ECHO MEAS - RV MEAN PG: 0.81 MMHG
BH CV ECHO MEAS - RV V1 MAX: 61.6 CM/SEC
BH CV ECHO MEAS - RV V1 MEAN: 41.6 CM/SEC
BH CV ECHO MEAS - RV V1 VTI: 17.4 CM
BH CV ECHO MEAS - RVOT AREA: 3.9 CM^2
BH CV ECHO MEAS - RVOT DIAM: 2.2 CM
BH CV ECHO MEAS - RVSP: 22.3 MMHG
BH CV ECHO MEAS - SI(AO): 154 ML/M^2
BH CV ECHO MEAS - SI(CUBED): 106.4 ML/M^2
BH CV ECHO MEAS - SI(LVOT): 33.3 ML/M^2
BH CV ECHO MEAS - SI(MOD-SP2): 22.6 ML/M^2
BH CV ECHO MEAS - SI(MOD-SP4): 23.6 ML/M^2
BH CV ECHO MEAS - SI(TEICH): 72.3 ML/M^2
BH CV ECHO MEAS - SUP REN AO DIAM: 2 CM
BH CV ECHO MEAS - SV(AO): 319.9 ML
BH CV ECHO MEAS - SV(CUBED): 221 ML
BH CV ECHO MEAS - SV(LVOT): 69.2 ML
BH CV ECHO MEAS - SV(MOD-SP2): 47 ML
BH CV ECHO MEAS - SV(MOD-SP4): 49 ML
BH CV ECHO MEAS - SV(RVOT): 68.7 ML
BH CV ECHO MEAS - SV(TEICH): 150.2 ML
BH CV ECHO MEAS - TAPSE (>1.6): 1.7 CM2
BH CV ECHO MEAS - TR MAX VEL: 219.9 CM/SEC
BH CV XLRA - RV BASE: 3.3 CM
BH CV XLRA - TDI S': 10 CM/SEC
DEPRECATED RDW RBC AUTO: 46.1 FL (ref 37–54)
E/E' RATIO: 8
EOSINOPHIL # BLD AUTO: 0.08 10*3/MM3 (ref 0–0.7)
EOSINOPHIL NFR BLD AUTO: 1 % (ref 0.3–6.2)
ERYTHROCYTE [DISTWIDTH] IN BLOOD BY AUTOMATED COUNT: 18.3 % (ref 11.7–13)
HCT VFR BLD AUTO: 24.5 % (ref 35.6–45.5)
HGB BLD-MCNC: 7 G/DL (ref 11.9–15.5)
HYPOCHROMIA BLD QL: NORMAL
IMM GRANULOCYTES # BLD: 0 10*3/MM3 (ref 0–0.03)
IMM GRANULOCYTES NFR BLD: 0 % (ref 0–0.5)
LEFT ATRIUM VOLUME INDEX: 26 ML/M2
LV EF 2D ECHO EST: 54 %
LYMPHOCYTES # BLD AUTO: 1.09 10*3/MM3 (ref 0.9–4.8)
LYMPHOCYTES NFR BLD AUTO: 13.8 % (ref 19.6–45.3)
MCH RBC QN AUTO: 19.6 PG (ref 26.9–32)
MCHC RBC AUTO-ENTMCNC: 28.6 G/DL (ref 32.4–36.3)
MCV RBC AUTO: 68.4 FL (ref 80.5–98.2)
MICROCYTES BLD QL: NORMAL
MONOCYTES # BLD AUTO: 0.56 10*3/MM3 (ref 0.2–1.2)
MONOCYTES NFR BLD AUTO: 7.1 % (ref 5–12)
NEUTROPHILS # BLD AUTO: 6.16 10*3/MM3 (ref 1.9–8.1)
NEUTROPHILS NFR BLD AUTO: 77.8 % (ref 42.7–76)
NRBC BLD MANUAL-RTO: 0 /100 WBC (ref 0–0)
OVALOCYTES BLD QL SMEAR: NORMAL
PLAT MORPH BLD: NORMAL
PLATELET # BLD AUTO: 207 10*3/MM3 (ref 140–500)
PMV BLD AUTO: 9.7 FL (ref 6–12)
RBC # BLD AUTO: 3.58 10*6/MM3 (ref 3.9–5.2)
SCHISTOCYTES BLD QL SMEAR: NORMAL
SINUS: 3.7 CM
STJ: 3.5 CM
WBC MORPH BLD: NORMAL
WBC NRBC COR # BLD: 7.91 10*3/MM3 (ref 4.5–10.7)

## 2017-07-24 PROCEDURE — 36415 COLL VENOUS BLD VENIPUNCTURE: CPT

## 2017-07-24 PROCEDURE — 85007 BL SMEAR W/DIFF WBC COUNT: CPT

## 2017-07-24 PROCEDURE — 93306 TTE W/DOPPLER COMPLETE: CPT | Performed by: INTERNAL MEDICINE

## 2017-07-24 PROCEDURE — 85025 COMPLETE CBC W/AUTO DIFF WBC: CPT

## 2017-07-24 PROCEDURE — C8929 TTE W OR WO FOL WCON,DOPPLER: HCPCS

## 2017-07-24 PROCEDURE — 25010000002 PERFLUTREN (DEFINITY) 8.476 MG IN SODIUM CHLORIDE 10 ML INJECTION: Performed by: NURSE PRACTITIONER

## 2017-07-24 RX ADMIN — PERFLUTREN 1.5 ML: 6.52 INJECTION, SUSPENSION INTRAVENOUS at 11:29

## 2017-07-25 ENCOUNTER — TELEPHONE (OUTPATIENT)
Dept: CARDIOLOGY | Facility: CLINIC | Age: 72
End: 2017-07-25

## 2017-07-25 NOTE — TELEPHONE ENCOUNTER
Echocardiogram reveals EF 54%.  Previously 33%.  No wall motion abnormalities.  Continue same.  Follow-up in 6 months

## 2017-08-02 ENCOUNTER — ON CAMPUS - OUTPATIENT (OUTPATIENT)
Dept: URBAN - METROPOLITAN AREA HOSPITAL 114 | Facility: HOSPITAL | Age: 72
End: 2017-08-02

## 2017-08-02 ENCOUNTER — ANESTHESIA (OUTPATIENT)
Dept: GASTROENTEROLOGY | Facility: HOSPITAL | Age: 72
End: 2017-08-02

## 2017-08-02 ENCOUNTER — ANESTHESIA EVENT (OUTPATIENT)
Dept: GASTROENTEROLOGY | Facility: HOSPITAL | Age: 72
End: 2017-08-02

## 2017-08-02 ENCOUNTER — HOSPITAL ENCOUNTER (OUTPATIENT)
Facility: HOSPITAL | Age: 72
Setting detail: HOSPITAL OUTPATIENT SURGERY
Discharge: HOME | End: 2017-08-02
Attending: INTERNAL MEDICINE | Admitting: INTERNAL MEDICINE

## 2017-08-02 VITALS
OXYGEN SATURATION: 99 % | SYSTOLIC BLOOD PRESSURE: 101 MMHG | TEMPERATURE: 98.4 F | WEIGHT: 249.7 LBS | RESPIRATION RATE: 18 BRPM | BODY MASS INDEX: 47.14 KG/M2 | DIASTOLIC BLOOD PRESSURE: 60 MMHG | HEIGHT: 61 IN | HEART RATE: 55 BPM

## 2017-08-02 DIAGNOSIS — D50.9 IRON DEFICIENCY ANEMIA, UNSPECIFIED: ICD-10-CM

## 2017-08-02 DIAGNOSIS — K25.4 CHRONIC OR UNSPECIFIED GASTRIC ULCER WITH HEMORRHAGE: ICD-10-CM

## 2017-08-02 DIAGNOSIS — K29.50 UNSPECIFIED CHRONIC GASTRITIS WITHOUT BLEEDING: ICD-10-CM

## 2017-08-02 DIAGNOSIS — K31.811 ANGIODYSPLASIA OF STOMACH AND DUODENUM WITH BLEEDING: ICD-10-CM

## 2017-08-02 DIAGNOSIS — K44.9 DIAPHRAGMATIC HERNIA WITHOUT OBSTRUCTION OR GANGRENE: ICD-10-CM

## 2017-08-02 DIAGNOSIS — D64.9 ANEMIA: ICD-10-CM

## 2017-08-02 LAB
ALBUMIN SERPL-MCNC: 3.6 G/DL (ref 3.5–5.2)
ALBUMIN/GLOB SERPL: 1.3 G/DL
ALP SERPL-CCNC: 97 U/L (ref 39–117)
ALT SERPL W P-5'-P-CCNC: 10 U/L (ref 1–33)
ANION GAP SERPL CALCULATED.3IONS-SCNC: 12.6 MMOL/L
AST SERPL-CCNC: 13 U/L (ref 1–32)
BILIRUB SERPL-MCNC: 0.4 MG/DL (ref 0.1–1.2)
BUN BLD-MCNC: 12 MG/DL (ref 8–23)
BUN/CREAT SERPL: 15 (ref 7–25)
CALCIUM SPEC-SCNC: 9 MG/DL (ref 8.6–10.5)
CHLORIDE SERPL-SCNC: 108 MMOL/L (ref 98–107)
CO2 SERPL-SCNC: 23.4 MMOL/L (ref 22–29)
CREAT BLD-MCNC: 0.8 MG/DL (ref 0.57–1)
DEPRECATED RDW RBC AUTO: 54.2 FL (ref 37–54)
ERYTHROCYTE [DISTWIDTH] IN BLOOD BY AUTOMATED COUNT: 24.1 % (ref 11.7–13)
GFR SERPL CREATININE-BSD FRML MDRD: 71 ML/MIN/1.73
GLOBULIN UR ELPH-MCNC: 2.8 GM/DL
GLUCOSE BLD-MCNC: 107 MG/DL (ref 65–99)
HCT VFR BLD AUTO: 25.8 % (ref 35.6–45.5)
HGB BLD-MCNC: 7.2 G/DL (ref 11.9–15.5)
MCH RBC QN AUTO: 20.7 PG (ref 26.9–32)
MCHC RBC AUTO-ENTMCNC: 27.9 G/DL (ref 32.4–36.3)
MCV RBC AUTO: 74.1 FL (ref 80.5–98.2)
PLATELET # BLD AUTO: 156 10*3/MM3 (ref 140–500)
PMV BLD AUTO: 10.2 FL (ref 6–12)
POTASSIUM BLD-SCNC: 4.5 MMOL/L (ref 3.5–5.2)
PROT SERPL-MCNC: 6.4 G/DL (ref 6–8.5)
RBC # BLD AUTO: 3.48 10*6/MM3 (ref 3.9–5.2)
SODIUM BLD-SCNC: 144 MMOL/L (ref 136–145)
WBC NRBC COR # BLD: 6.45 10*3/MM3 (ref 4.5–10.7)

## 2017-08-02 PROCEDURE — 25010000002 PROPOFOL 10 MG/ML EMULSION: Performed by: ANESTHESIOLOGY

## 2017-08-02 PROCEDURE — 25010000002 PROPOFOL 1000 MG/ML EMULSION: Performed by: ANESTHESIOLOGY

## 2017-08-02 PROCEDURE — 80053 COMPREHEN METABOLIC PANEL: CPT | Performed by: INTERNAL MEDICINE

## 2017-08-02 PROCEDURE — 88305 TISSUE EXAM BY PATHOLOGIST: CPT | Performed by: INTERNAL MEDICINE

## 2017-08-02 PROCEDURE — 44361 SMALL BOWEL ENDOSCOPY/BIOPSY: CPT | Performed by: INTERNAL MEDICINE

## 2017-08-02 PROCEDURE — 88312 SPECIAL STAINS GROUP 1: CPT | Performed by: INTERNAL MEDICINE

## 2017-08-02 PROCEDURE — 85027 COMPLETE CBC AUTOMATED: CPT | Performed by: INTERNAL MEDICINE

## 2017-08-02 PROCEDURE — 44366 SMALL BOWEL ENDOSCOPY: CPT | Mod: 59 | Performed by: INTERNAL MEDICINE

## 2017-08-02 RX ORDER — LIDOCAINE HYDROCHLORIDE 20 MG/ML
INJECTION, SOLUTION INFILTRATION; PERINEURAL AS NEEDED
Status: DISCONTINUED | OUTPATIENT
Start: 2017-08-02 | End: 2017-08-02 | Stop reason: SURG

## 2017-08-02 RX ORDER — SODIUM CHLORIDE, SODIUM LACTATE, POTASSIUM CHLORIDE, CALCIUM CHLORIDE 600; 310; 30; 20 MG/100ML; MG/100ML; MG/100ML; MG/100ML
30 INJECTION, SOLUTION INTRAVENOUS CONTINUOUS PRN
Status: DISCONTINUED | OUTPATIENT
Start: 2017-08-02 | End: 2017-08-02 | Stop reason: HOSPADM

## 2017-08-02 RX ORDER — PROPOFOL 10 MG/ML
VIAL (ML) INTRAVENOUS AS NEEDED
Status: DISCONTINUED | OUTPATIENT
Start: 2017-08-02 | End: 2017-08-02 | Stop reason: SURG

## 2017-08-02 RX ORDER — SODIUM CHLORIDE 0.9 % (FLUSH) 0.9 %
1-10 SYRINGE (ML) INJECTION AS NEEDED
Status: DISCONTINUED | OUTPATIENT
Start: 2017-08-02 | End: 2017-08-02 | Stop reason: HOSPADM

## 2017-08-02 RX ADMIN — SODIUM CHLORIDE, POTASSIUM CHLORIDE, SODIUM LACTATE AND CALCIUM CHLORIDE: 600; 310; 30; 20 INJECTION, SOLUTION INTRAVENOUS at 10:16

## 2017-08-02 RX ADMIN — PROPOFOL 140 MG: 10 INJECTION, EMULSION INTRAVENOUS at 10:36

## 2017-08-02 RX ADMIN — PROPOFOL 160 MCG/KG/MIN: 10 INJECTION, EMULSION INTRAVENOUS at 10:36

## 2017-08-02 RX ADMIN — LIDOCAINE HYDROCHLORIDE 40 MG: 20 INJECTION, SOLUTION INFILTRATION; PERINEURAL at 10:36

## 2017-08-02 RX ADMIN — SODIUM CHLORIDE, POTASSIUM CHLORIDE, SODIUM LACTATE AND CALCIUM CHLORIDE 30 ML/HR: 600; 310; 30; 20 INJECTION, SOLUTION INTRAVENOUS at 10:07

## 2017-08-02 NOTE — H&P
Patient Care Team:  Donnell Lee MD as PCP - General  Donnell Lee MD as PCP - Family Medicine  Donnell Lee MD as PCP - Claims Attributed  Anastasiia Callahan MD as Consulting Physician (Cardiology)  Alvarado Rivera MD as Consulting Physician (Gastroenterology)  Bianka Galaviz MD as Consulting Physician (Pulmonary Disease)    Chief complaint  fe def anemia    Subjective     History of Present Illness  No black stools , no recently bloody stools   Review of Systems   Constitutional: Positive for fatigue.   All other systems reviewed and are negative.       Past Medical History:   Diagnosis Date   • Anemia    • Bronchitis 05/2016   • Chest pain    • Colon polyps    • Coronary artery disease    • Dyslipidemia    • History of transfusion     anemia   • Hypertension    • Hypocalcemia    • Hypokalemia    • Obesity    • LOVE (obstructive sleep apnea)    • Palpitations    • Sleep apnea      Past Surgical History:   Procedure Laterality Date   • BYPASS GRAFT     • CHOLECYSTECTOMY     • COLONOSCOPY N/A 6/8/2016    Procedure: COLONOSCOPY to cecum with cold biopsy polypectomies;  Surgeon: Alvarado Rivera MD;  Location: [facility] ENDOSCOPY;  Service:    • CORONARY ARTERY BYPASS GRAFT  02/28/2016    CABG x4  Dr Monroy   • ENDOSCOPY N/A 6/8/2016    Procedure: ESOPHAGOGASTRODUODENOSCOPY with biopsy;  Surgeon: Alvarado Rivera MD;  Location: [facility] ENDOSCOPY;  Service:    • HERNIA REPAIR     • JOINT REPLACEMENT      vijaya knees   • OTHER SURGICAL HISTORY      KNEE REPLACEMENT   • OTHER SURGICAL HISTORY      TREATMENT OF ANKLE FRACTURE   • OTHER SURGICAL HISTORY      TREATMENT FRACTURE OF THE HAND     Family History   Problem Relation Age of Onset   • Coronary artery disease Mother    • Other Father    • Stroke Sister      Social History   Substance Use Topics   • Smoking status: Never Smoker   • Smokeless tobacco: Never Used      Comment: caffeine use   • Alcohol use Yes      Comment: OCCASIONALLY     Prescriptions Prior to  Admission   Medication Sig Dispense Refill Last Dose   • albuterol (PROVENTIL HFA;VENTOLIN HFA) 108 (90 BASE) MCG/ACT inhaler Inhale 2 puffs Every 4 (Four) Hours As Needed for wheezing. 1 inhaler 11 Past Week at Unknown time   • aspirin 81 MG tablet Take 1 tablet by mouth daily.   8/2/2017 at Unknown time   • atorvastatin (LIPITOR) 40 MG tablet TAKE 1 TABLET DAILY 90 tablet 2 8/1/2017 at Unknown time   • carvedilol (COREG) 12.5 MG tablet TAKE 1 TABLET TWICE A  tablet 2 8/2/2017 at Unknown time   • IRON, FERROUS SULFATE, PO Take 1 tablet by mouth 3 (Three) Times a Day.   8/2/2017 at Unknown time   • losartan (COZAAR) 100 MG tablet Take 0.5 tablets by mouth Daily. 90 tablet 3 8/1/2017 at Unknown time   • pantoprazole (PROTONIX) 40 MG EC tablet TAKE 1 TABLET DAILY 90 tablet 2 8/2/2017 at Unknown time     Allergies:  Codeine and Iodinated diagnostic agents    Objective      Vital Signs  Temp:  [98.3 °F (36.8 °C)] 98.3 °F (36.8 °C)  Heart Rate:  [67] 67  Resp:  [18] 18  BP: (132)/(68) 132/68    Physical Exam   Constitutional: She appears well-developed and well-nourished.   HENT:   Mouth/Throat: Oropharynx is clear and moist.   Eyes: Conjunctivae are normal.   Neck: Neck supple.       Results Review:   I reviewed the patient's new clinical results.      Assessment/Plan     Active Problems:    * No active hospital problems. *      Assessment:  (Microcytic anemia).     Plan:   (Small bowel enteroscopy Risks, alternatives and benefits discussed with patient and patient is agreeable to proceed. ).       I discussed the patients findings and my recommendations with patient and nursing staff    Alvarado Rivera MD  08/02/17  10:35 AM

## 2017-08-02 NOTE — ANESTHESIA POSTPROCEDURE EVALUATION
"Patient: Julee Narvaezckel    Procedure Summary     Date Anesthesia Start Anesthesia Stop Room / Location    08/02/17 1031 1103  [facility]       Procedure Diagnosis Surgeon Provider    ENTEROSCOPY SMALL BOWEL WITH COLD BIOPSIES AND ARGON PLASMA COAGULATION TO  GASTIC ULCERS (N/A Esophagus) No diagnosis on file. MD Linda Domingo MD          Anesthesia Type: MAC  Last vitals  BP        Temp        Pulse       Resp        SpO2          Post Anesthesia Care and Evaluation    Patient location during evaluation: bedside  Patient participation: complete - patient participated  Level of consciousness: awake and alert  Pain management: adequate  Airway patency: patent  Anesthetic complications: No anesthetic complications  PONV Status: none  Cardiovascular status: acceptable  Respiratory status: acceptable  Hydration status: acceptable    Comments: /68 (BP Location: Left arm, Patient Position: Lying)  Pulse 67  Temp 36.8 °C (98.3 °F) (Oral)   Resp 18  Ht 61\" (154.9 cm)  Wt 249 lb 11.2 oz (113 kg)  SpO2 97%  BMI 47.18 kg/m2        "

## 2017-08-02 NOTE — PLAN OF CARE
Problem: Patient Care Overview (Adult)  Goal: Plan of Care Review  Outcome: Ongoing (interventions implemented as appropriate)    08/02/17 0947   Coping/Psychosocial Response Interventions   Plan Of Care Reviewed With patient   Patient Care Overview   Progress no change       Goal: Adult Individualization and Mutuality  Outcome: Ongoing (interventions implemented as appropriate)    08/02/17 0947   Individualization   Patient Specific Preferences none       Goal: Discharge Needs Assessment  Outcome: Ongoing (interventions implemented as appropriate)    08/02/17 0947   Discharge Needs Assessment   Concerns To Be Addressed no discharge needs identified   Living Environment   Transportation Available family or friend will provide         Problem: GI Endoscopy (Adult)  Goal: Signs and Symptoms of Listed Potential Problems Will be Absent or Manageable (GI Endoscopy)  Outcome: Ongoing (interventions implemented as appropriate)    08/02/17 0947   GI Endoscopy   Problems Present (GI Endoscopy) none

## 2017-08-02 NOTE — ANESTHESIA PREPROCEDURE EVALUATION
Anesthesia Evaluation     Patient summary reviewed   NPO Solid Status: > 8 hours  NPO Liquid Status: > 8 hours     Airway   Mallampati: I  TM distance: >3 FB  Dental      Pulmonary    (+) shortness of breath, sleep apnea on CPAP,   Cardiovascular     Rhythm: regular  Rate: normal    (+) hypertension, CAD, CABG > 6 Months,       Neuro/Psych  GI/Hepatic/Renal/Endo    (+) obesity, morbid obesity, GERD,     Musculoskeletal     Abdominal    Substance History      OB/GYN          Other                                        Anesthesia Plan    ASA 3     MAC   total IV anesthesia  Anesthetic plan and risks discussed with patient.

## 2017-08-03 LAB
LAB AP CASE REPORT: NORMAL
LAB AP CLINICAL INFORMATION: NORMAL
Lab: NORMAL
PATH REPORT.FINAL DX SPEC: NORMAL
PATH REPORT.GROSS SPEC: NORMAL

## 2017-08-29 ENCOUNTER — OFFICE (OUTPATIENT)
Dept: URBAN - METROPOLITAN AREA OTHER 6 | Facility: OTHER | Age: 72
End: 2017-08-29

## 2017-08-29 VITALS — HEART RATE: 68 BPM | WEIGHT: 250 LBS | SYSTOLIC BLOOD PRESSURE: 132 MMHG | DIASTOLIC BLOOD PRESSURE: 70 MMHG

## 2017-08-29 DIAGNOSIS — K25.9 GASTRIC ULCER, UNSPECIFIED AS ACUTE OR CHRONIC, WITHOUT HEMO: ICD-10-CM

## 2017-08-29 DIAGNOSIS — K62.5 HEMORRHAGE OF ANUS AND RECTUM: ICD-10-CM

## 2017-08-29 DIAGNOSIS — D50.9 IRON DEFICIENCY ANEMIA, UNSPECIFIED: ICD-10-CM

## 2017-08-29 PROCEDURE — 99212 OFFICE O/P EST SF 10 MIN: CPT | Performed by: INTERNAL MEDICINE

## 2017-08-30 ENCOUNTER — TRANSCRIBE ORDERS (OUTPATIENT)
Dept: ADMINISTRATIVE | Facility: HOSPITAL | Age: 72
End: 2017-08-30

## 2017-08-30 ENCOUNTER — LAB (OUTPATIENT)
Dept: LAB | Facility: HOSPITAL | Age: 72
End: 2017-08-30
Attending: INTERNAL MEDICINE

## 2017-08-30 DIAGNOSIS — K25.9 GASTRIC ULCER, UNSPECIFIED CHRONICITY: Primary | ICD-10-CM

## 2017-08-30 DIAGNOSIS — K25.9 GASTRIC ULCER, UNSPECIFIED CHRONICITY: ICD-10-CM

## 2017-08-30 DIAGNOSIS — K62.5 RECTAL BLEEDING: ICD-10-CM

## 2017-08-30 DIAGNOSIS — D50.9 IRON DEFICIENCY ANEMIA, UNSPECIFIED IRON DEFICIENCY ANEMIA TYPE: ICD-10-CM

## 2017-08-30 LAB
ALBUMIN SERPL-MCNC: 4.1 G/DL (ref 3.5–5.2)
ALBUMIN/GLOB SERPL: 1.5 G/DL
ALP SERPL-CCNC: 98 U/L (ref 39–117)
ALT SERPL W P-5'-P-CCNC: 14 U/L (ref 1–33)
ANION GAP SERPL CALCULATED.3IONS-SCNC: 10.9 MMOL/L
ANISOCYTOSIS BLD QL: NORMAL
AST SERPL-CCNC: 18 U/L (ref 1–32)
BASOPHILS # BLD AUTO: 0.03 10*3/MM3 (ref 0–0.2)
BASOPHILS NFR BLD AUTO: 0.5 % (ref 0–1.5)
BILIRUB SERPL-MCNC: 0.4 MG/DL (ref 0.1–1.2)
BUN BLD-MCNC: 16 MG/DL (ref 8–23)
BUN/CREAT SERPL: 21.9 (ref 7–25)
CALCIUM SPEC-SCNC: 8.8 MG/DL (ref 8.6–10.5)
CHLORIDE SERPL-SCNC: 110 MMOL/L (ref 98–107)
CO2 SERPL-SCNC: 24.1 MMOL/L (ref 22–29)
CREAT BLD-MCNC: 0.73 MG/DL (ref 0.57–1)
DEPRECATED RDW RBC AUTO: 73.7 FL (ref 37–54)
EOSINOPHIL # BLD AUTO: 0.12 10*3/MM3 (ref 0–0.7)
EOSINOPHIL NFR BLD AUTO: 2.1 % (ref 0.3–6.2)
ERYTHROCYTE [DISTWIDTH] IN BLOOD BY AUTOMATED COUNT: 25.9 % (ref 11.7–13)
GFR SERPL CREATININE-BSD FRML MDRD: 79 ML/MIN/1.73
GLOBULIN UR ELPH-MCNC: 2.8 GM/DL
GLUCOSE BLD-MCNC: 120 MG/DL (ref 65–99)
HCT VFR BLD AUTO: 37.7 % (ref 35.6–45.5)
HGB BLD-MCNC: 11.3 G/DL (ref 11.9–15.5)
IMM GRANULOCYTES # BLD: 0 10*3/MM3 (ref 0–0.03)
IMM GRANULOCYTES NFR BLD: 0 % (ref 0–0.5)
INR PPP: 1.13 (ref 0.9–1.1)
LYMPHOCYTES # BLD AUTO: 1.21 10*3/MM3 (ref 0.9–4.8)
LYMPHOCYTES NFR BLD AUTO: 21.3 % (ref 19.6–45.3)
MCH RBC QN AUTO: 24.5 PG (ref 26.9–32)
MCHC RBC AUTO-ENTMCNC: 30 G/DL (ref 32.4–36.3)
MCV RBC AUTO: 81.6 FL (ref 80.5–98.2)
MONOCYTES # BLD AUTO: 0.45 10*3/MM3 (ref 0.2–1.2)
MONOCYTES NFR BLD AUTO: 7.9 % (ref 5–12)
NEUTROPHILS # BLD AUTO: 3.86 10*3/MM3 (ref 1.9–8.1)
NEUTROPHILS NFR BLD AUTO: 68.2 % (ref 42.7–76)
NRBC BLD MANUAL-RTO: 0 /100 WBC (ref 0–0)
OVALOCYTES BLD QL SMEAR: NORMAL
PLAT MORPH BLD: NORMAL
PLATELET # BLD AUTO: 157 10*3/MM3 (ref 140–500)
POTASSIUM BLD-SCNC: 3.9 MMOL/L (ref 3.5–5.2)
PROT SERPL-MCNC: 6.9 G/DL (ref 6–8.5)
PROTHROMBIN TIME: 14.1 SECONDS (ref 11.7–14.2)
RBC # BLD AUTO: 4.62 10*6/MM3 (ref 3.9–5.2)
RETICS/RBC NFR AUTO: 1.83 % (ref 0.5–1.5)
SODIUM BLD-SCNC: 145 MMOL/L (ref 136–145)
SPHEROCYTES BLD QL SMEAR: NORMAL
WBC MORPH BLD: NORMAL
WBC NRBC COR # BLD: 5.67 10*3/MM3 (ref 4.5–10.7)

## 2017-08-30 PROCEDURE — 85007 BL SMEAR W/DIFF WBC COUNT: CPT

## 2017-08-30 PROCEDURE — 36415 COLL VENOUS BLD VENIPUNCTURE: CPT

## 2017-08-30 PROCEDURE — 85610 PROTHROMBIN TIME: CPT

## 2017-08-30 PROCEDURE — 80053 COMPREHEN METABOLIC PANEL: CPT

## 2017-08-30 PROCEDURE — 85045 AUTOMATED RETICULOCYTE COUNT: CPT

## 2017-08-30 PROCEDURE — 85025 COMPLETE CBC W/AUTO DIFF WBC: CPT

## 2017-09-27 RX ORDER — PANTOPRAZOLE SODIUM 40 MG/1
TABLET, DELAYED RELEASE ORAL
Qty: 90 TABLET | Refills: 2 | OUTPATIENT
Start: 2017-09-27

## 2017-10-10 RX ORDER — ATORVASTATIN CALCIUM 40 MG/1
TABLET, FILM COATED ORAL
Qty: 90 TABLET | Refills: 2 | Status: SHIPPED | OUTPATIENT
Start: 2017-10-10 | End: 2018-07-07 | Stop reason: SDUPTHER

## 2017-12-07 ENCOUNTER — LAB (OUTPATIENT)
Dept: LAB | Facility: HOSPITAL | Age: 72
End: 2017-12-07

## 2017-12-07 ENCOUNTER — TRANSCRIBE ORDERS (OUTPATIENT)
Dept: ADMINISTRATIVE | Facility: HOSPITAL | Age: 72
End: 2017-12-07

## 2017-12-07 ENCOUNTER — OFFICE (OUTPATIENT)
Dept: URBAN - METROPOLITAN AREA OTHER 6 | Facility: OTHER | Age: 72
End: 2017-12-07

## 2017-12-07 VITALS — SYSTOLIC BLOOD PRESSURE: 130 MMHG | HEART RATE: 64 BPM | WEIGHT: 253 LBS | DIASTOLIC BLOOD PRESSURE: 90 MMHG

## 2017-12-07 DIAGNOSIS — K92.1 MELENA: ICD-10-CM

## 2017-12-07 DIAGNOSIS — Z86.010 PERSONAL HISTORY OF COLONIC POLYPS: ICD-10-CM

## 2017-12-07 DIAGNOSIS — K62.5 HEMORRHAGE OF RECTUM AND ANUS: ICD-10-CM

## 2017-12-07 DIAGNOSIS — K62.5 HEMORRHAGE OF ANUS AND RECTUM: ICD-10-CM

## 2017-12-07 DIAGNOSIS — K92.1 BLOOD IN STOOL: Primary | ICD-10-CM

## 2017-12-07 DIAGNOSIS — K92.1 BLOOD IN STOOL: ICD-10-CM

## 2017-12-07 LAB
ALBUMIN SERPL-MCNC: 4.1 G/DL (ref 3.5–5.2)
ALBUMIN/GLOB SERPL: 1.2 G/DL
ALP SERPL-CCNC: 122 U/L (ref 39–117)
ALT SERPL W P-5'-P-CCNC: 14 U/L (ref 1–33)
ANION GAP SERPL CALCULATED.3IONS-SCNC: 13 MMOL/L
AST SERPL-CCNC: 14 U/L (ref 1–32)
BASOPHILS # BLD AUTO: 0.02 10*3/MM3 (ref 0–0.2)
BASOPHILS NFR BLD AUTO: 0.3 % (ref 0–1.5)
BILIRUB SERPL-MCNC: 0.9 MG/DL (ref 0.1–1.2)
BUN BLD-MCNC: 14 MG/DL (ref 8–23)
BUN/CREAT SERPL: 21.9 (ref 7–25)
CALCIUM SPEC-SCNC: 8.8 MG/DL (ref 8.6–10.5)
CHLORIDE SERPL-SCNC: 108 MMOL/L (ref 98–107)
CO2 SERPL-SCNC: 23 MMOL/L (ref 22–29)
CREAT BLD-MCNC: 0.64 MG/DL (ref 0.57–1)
DEPRECATED RDW RBC AUTO: 44.2 FL (ref 37–54)
EOSINOPHIL # BLD AUTO: 0.11 10*3/MM3 (ref 0–0.7)
EOSINOPHIL NFR BLD AUTO: 1.5 % (ref 0.3–6.2)
ERYTHROCYTE [DISTWIDTH] IN BLOOD BY AUTOMATED COUNT: 14.2 % (ref 11.7–13)
GFR SERPL CREATININE-BSD FRML MDRD: 91 ML/MIN/1.73
GLOBULIN UR ELPH-MCNC: 3.3 GM/DL
GLUCOSE BLD-MCNC: 111 MG/DL (ref 65–99)
HCT VFR BLD AUTO: 45.8 % (ref 35.6–45.5)
HGB BLD-MCNC: 14.8 G/DL (ref 11.9–15.5)
IMM GRANULOCYTES # BLD: 0 10*3/MM3 (ref 0–0.03)
IMM GRANULOCYTES NFR BLD: 0 % (ref 0–0.5)
LYMPHOCYTES # BLD AUTO: 1.71 10*3/MM3 (ref 0.9–4.8)
LYMPHOCYTES NFR BLD AUTO: 22.9 % (ref 19.6–45.3)
MCH RBC QN AUTO: 27.7 PG (ref 26.9–32)
MCHC RBC AUTO-ENTMCNC: 32.3 G/DL (ref 32.4–36.3)
MCV RBC AUTO: 85.6 FL (ref 80.5–98.2)
MONOCYTES # BLD AUTO: 0.6 10*3/MM3 (ref 0.2–1.2)
MONOCYTES NFR BLD AUTO: 8 % (ref 5–12)
NEUTROPHILS # BLD AUTO: 5.04 10*3/MM3 (ref 1.9–8.1)
NEUTROPHILS NFR BLD AUTO: 67.3 % (ref 42.7–76)
NRBC BLD MANUAL-RTO: 0 /100 WBC (ref 0–0)
PLATELET # BLD AUTO: 153 10*3/MM3 (ref 140–500)
PMV BLD AUTO: 12 FL (ref 6–12)
POTASSIUM BLD-SCNC: 3.8 MMOL/L (ref 3.5–5.2)
PROT SERPL-MCNC: 7.4 G/DL (ref 6–8.5)
RBC # BLD AUTO: 5.35 10*6/MM3 (ref 3.9–5.2)
SODIUM BLD-SCNC: 144 MMOL/L (ref 136–145)
WBC NRBC COR # BLD: 7.48 10*3/MM3 (ref 4.5–10.7)

## 2017-12-07 PROCEDURE — 99212 OFFICE O/P EST SF 10 MIN: CPT

## 2017-12-07 PROCEDURE — 80053 COMPREHEN METABOLIC PANEL: CPT

## 2017-12-07 PROCEDURE — 36415 COLL VENOUS BLD VENIPUNCTURE: CPT

## 2017-12-07 PROCEDURE — 85025 COMPLETE CBC W/AUTO DIFF WBC: CPT

## 2018-01-16 ENCOUNTER — OFFICE VISIT (OUTPATIENT)
Dept: CARDIOLOGY | Facility: CLINIC | Age: 73
End: 2018-01-16

## 2018-01-16 VITALS
BODY MASS INDEX: 47.77 KG/M2 | WEIGHT: 253 LBS | HEIGHT: 61 IN | SYSTOLIC BLOOD PRESSURE: 158 MMHG | HEART RATE: 64 BPM | DIASTOLIC BLOOD PRESSURE: 80 MMHG

## 2018-01-16 DIAGNOSIS — I42.9 CARDIOMYOPATHY, UNSPECIFIED TYPE (HCC): ICD-10-CM

## 2018-01-16 DIAGNOSIS — G47.33 OBSTRUCTIVE APNEA: ICD-10-CM

## 2018-01-16 DIAGNOSIS — IMO0001 CLASS 3 OBESITY DUE TO EXCESS CALORIES WITH SERIOUS COMORBIDITY AND BODY MASS INDEX (BMI) OF 45.0 TO 49.9 IN ADULT: ICD-10-CM

## 2018-01-16 DIAGNOSIS — E78.5 DYSLIPIDEMIA: ICD-10-CM

## 2018-01-16 DIAGNOSIS — I10 ESSENTIAL HYPERTENSION: ICD-10-CM

## 2018-01-16 DIAGNOSIS — I25.10 CORONARY ARTERY DISEASE INVOLVING NATIVE CORONARY ARTERY OF NATIVE HEART WITHOUT ANGINA PECTORIS: Primary | ICD-10-CM

## 2018-01-16 DIAGNOSIS — Z95.1 S/P CABG (CORONARY ARTERY BYPASS GRAFT): ICD-10-CM

## 2018-01-16 PROCEDURE — 99214 OFFICE O/P EST MOD 30 MIN: CPT | Performed by: INTERNAL MEDICINE

## 2018-01-16 PROCEDURE — 93000 ELECTROCARDIOGRAM COMPLETE: CPT | Performed by: INTERNAL MEDICINE

## 2018-01-16 RX ORDER — LOSARTAN POTASSIUM 100 MG/1
100 TABLET ORAL NIGHTLY
Qty: 90 TABLET | Refills: 3 | Status: SHIPPED | OUTPATIENT
Start: 2018-01-16 | End: 2018-12-18 | Stop reason: SDUPTHER

## 2018-01-16 NOTE — PROGRESS NOTES
Date of Office Visit: 2018  Encounter Provider: Anastasiia Callahan MD  Place of Service: [facility] CARDIOLOGY  Patient Name: Julee King  :1945      Patient ID:  Julee King is a 72 y.o. female is here for  followup for CAD.         History of Present Illness    She is followed for CAD, s/p CABG.     She came in with midsternal chest pain 2014 to [facility]. She had known history  of hypertension, hyperlipidemia, and had had a history of mild obstructive sleep apnea,  but was not using a CPAP machine. She was exercising at the [facility] 3 times a week and when  she would get on the treadmill she would notice this chest tightness. When we saw her in  the chest pain unit her blood pressure was a bit elevated. We ordered a couple of studies.  She had a 2-D echocardiogram with Doppler done 2014 showing ejection fraction of 65%  with grade I diastolic dysfunction and mild left ventricular hypertrophy. She also had a  PET stress study, which was done 2014, which showed no evidence of ischemia. We then  made a referral to [facility]. She was found  to have obstructive sleep apnea, which was moderate to severe, with recommendation of a  CPAP, which she is currently using. She says she is having some difficulty tolerating it,  but she is going to try to stick with it.       She then presented to the hospital 2016 with chest pain and pressure. She was severely anemic and found to have gastrointestinal bleeding. She did receive an transfusions. Her troponin was elevated at 4. Her echocardiogram showed left ventricular ejection fraction of 33% with mild to moderate mitral insufficiency. She went on to have a cardiac catheterization done which showed 80% distal left main stenosis, 60% distal first obtuse marginal stenosis, 30% ramus mid vessel stenosis, 30% proximal LAD stenosis, 100% occluded first diagonal branch,  70% mid LAD stenosis, 100% proximal RCA stenosis and 95% distal RCA stenosis. She went on to have coronary bypass grafting 1/22/2016. She received LIMA to LAD, SVG to obtuse marginal 1, SVG to obtuse marginal 2 and SVG to LV branch. She also had endoscopy Dr. Senior done 1/24/2016 showing small hiatal hernia, normal stomach and duodenal. She still needs a lower endoscopy done at this time.      Her carotid study done 1/25/2016 shows 16-49% stenosis of left internal carotid artery. The right internal carotid artery is patent.  She had a normal carotid duplex done January 2017.  She had an echocardiogram done July 2017 which showed ejection fraction 54% and was otherwise normal.      She sees Dr. Galaviz for LOVE. She has a colonoscopy 6/2016 with Dr. Rivera and it was good.       She has mild dyspnea on exertion.  Blood pressure is high today.  She doesn't feel her heart racing or skipping.  She has no dizziness or syncope.  She has no exertional chest tightness or pressure.  She is taking her medications as directed.  She feels well.    Past Medical History:   Diagnosis Date   • Anemia    • Bronchitis 05/2016   • Chest pain    • Colon polyps    • Coronary artery disease    • Dyslipidemia    • History of transfusion     anemia   • Hypertension    • Hypocalcemia    • Hypokalemia    • Obesity    • LOVE (obstructive sleep apnea)    • Palpitations    • Sleep apnea          Past Surgical History:   Procedure Laterality Date   • BYPASS GRAFT     • CHOLECYSTECTOMY     • COLONOSCOPY N/A 6/8/2016    Procedure: COLONOSCOPY to cecum with cold biopsy polypectomies;  Surgeon: Alvarado Rivera MD;  Location: [facility] ENDOSCOPY;  Service:    • CORONARY ARTERY BYPASS GRAFT  02/28/2016    CABG x4  Dr Monroy   • ENDOSCOPY N/A 6/8/2016    Procedure: ESOPHAGOGASTRODUODENOSCOPY with biopsy;  Surgeon: Alvarado Rivera MD;  Location: [facility] ENDOSCOPY;  Service:    • ENTEROSCOPY SMALL BOWEL N/A 8/2/2017    Procedure: ENTEROSCOPY SMALL BOWEL WITH COLD  BIOPSIES AND ARGON PLASMA COAGULATION TO  GASTIC ULCERS;  Surgeon: Alvarado Rivera MD;  Location: [facility] ENDOSCOPY;  Service:    • HERNIA REPAIR     • JOINT REPLACEMENT      vijaya knees   • OTHER SURGICAL HISTORY      KNEE REPLACEMENT   • OTHER SURGICAL HISTORY      TREATMENT OF ANKLE FRACTURE   • OTHER SURGICAL HISTORY      TREATMENT FRACTURE OF THE HAND       Current Outpatient Prescriptions on File Prior to Visit   Medication Sig Dispense Refill   • albuterol (PROVENTIL HFA;VENTOLIN HFA) 108 (90 BASE) MCG/ACT inhaler Inhale 2 puffs Every 4 (Four) Hours As Needed for wheezing. 1 inhaler 11   • aspirin 81 MG tablet Take 1 tablet by mouth daily.     • atorvastatin (LIPITOR) 40 MG tablet TAKE 1 TABLET DAILY 90 tablet 2   • carvedilol (COREG) 12.5 MG tablet TAKE 1 TABLET TWICE A  tablet 2   • IRON, FERROUS SULFATE, PO Take 1 tablet by mouth 3 (Three) Times a Day.     • pantoprazole (PROTONIX) 40 MG EC tablet TAKE 1 TABLET DAILY 90 tablet 2   • [DISCONTINUED] losartan (COZAAR) 100 MG tablet Take 0.5 tablets by mouth Daily. 90 tablet 3     No current facility-administered medications on file prior to visit.        Social History     Social History   • Marital status:      Spouse name: N/A   • Number of children: N/A   • Years of education: N/A     Occupational History   • Not on file.     Social History Main Topics   • Smoking status: Never Smoker   • Smokeless tobacco: Never Used      Comment: caffeine use   • Alcohol use Yes      Comment: OCCASIONALLY   • Drug use: No      Comment: CAFFEINE USE    • Sexual activity: Not on file     Other Topics Concern   • Not on file     Social History Narrative           Review of Systems   Constitution: Negative.   HENT: Negative for congestion.    Eyes: Negative for vision loss in left eye and vision loss in right eye.   Respiratory: Negative.  Negative for cough, hemoptysis, shortness of breath, sleep disturbances due to breathing, snoring, sputum production and  "wheezing.    Endocrine: Negative.    Hematologic/Lymphatic: Negative.    Skin: Negative for poor wound healing and rash.   Musculoskeletal: Negative for falls, gout, muscle cramps and myalgias.   Gastrointestinal: Negative for abdominal pain, diarrhea, dysphagia, hematemesis, melena, nausea and vomiting.   Neurological: Negative for excessive daytime sleepiness, dizziness, headaches, light-headedness, loss of balance, seizures and vertigo.   Psychiatric/Behavioral: Negative for depression and substance abuse. The patient is not nervous/anxious.        Procedures    ECG 12 Lead  Date/Time: 1/16/2018 12:24 PM  Performed by: AIDEN CROWE  Authorized by: AIDEN CROWE   Comparison: compared with previous ECG   Similar to previous ECG  Rhythm: sinus rhythm  T depression: V2, V3, V4 and V5  Clinical impression: abnormal ECG               Objective:      Vitals:    01/16/18 1206   BP: 158/80   BP Location: Left arm   Pulse: 64   Weight: 115 kg (253 lb)   Height: 154.9 cm (61\")     Body mass index is 47.8 kg/(m^2).    Physical Exam   Constitutional: She is oriented to person, place, and time. She appears well-developed and well-nourished. No distress.   HENT:   Head: Normocephalic and atraumatic.   Eyes: Conjunctivae are normal. No scleral icterus.   Neck: Neck supple. No JVD present. Carotid bruit is not present. No thyromegaly present.   Cardiovascular: Normal rate, regular rhythm, S1 normal, S2 normal, normal heart sounds and intact distal pulses.   No extrasystoles are present. PMI is not displaced.  Exam reveals no gallop.    No murmur heard.  Pulses:       Carotid pulses are 2+ on the right side, and 2+ on the left side.       Radial pulses are 2+ on the right side, and 2+ on the left side.        Dorsalis pedis pulses are 2+ on the right side, and 2+ on the left side.        Posterior tibial pulses are 2+ on the right side, and 2+ on the left side.   Pulmonary/Chest: Effort normal and breath sounds " normal. No respiratory distress. She has no wheezes. She has no rhonchi. She has no rales. She exhibits no tenderness.   Abdominal: Soft. Bowel sounds are normal. She exhibits no distension, no abdominal bruit and no mass. There is no tenderness.   Musculoskeletal: She exhibits no edema or deformity.   Lymphadenopathy:     She has no cervical adenopathy.   Neurological: She is alert and oriented to person, place, and time. No cranial nerve deficit.   Skin: Skin is warm and dry. No rash noted. She is not diaphoretic. No cyanosis. No pallor. Nails show no clubbing.   Psychiatric: She has a normal mood and affect. Judgment normal.   Vitals reviewed.      Lab Review:       Assessment:      Diagnosis Plan   1. Coronary artery disease involving native coronary artery of native heart without angina pectoris     2. Cardiomyopathy, unspecified type     3. Essential hypertension     4. Obstructive apnea     5. Class 3 obesity due to excess calories with serious comorbidity and body mass index (BMI) of 45.0 to 49.9 in adult     6. Dyslipidemia     7. S/P CABG (coronary artery bypass graft)           1.                  Coronary artery bypass grafting in 01/2016.  no angina or chf.   2.                   Gastrointestinal bleeding in 01/2016.    3.                   Hypertension.  not well controlled, increase losartan to 100mg daily.   4.                   Hyperlipidemia.    5.                   Obstructive sleep apnea.  on CPAP.   6.                   Right internal carotid artery stenosis, mild, in 01/2016. Normal carotid duplex 1/2017.      Plan:       See NP in 6 months, increase losartan.  BP target is 120/80.     Coronary Artery Disease  Assessment  • The patient has no angina    Subjective - Objective  • There is a history of previous coronary artery bypass graft  • Current antiplatelet therapy includes aspirin 81 mg

## 2018-02-16 RX ORDER — CARVEDILOL 12.5 MG/1
TABLET ORAL
Qty: 180 TABLET | Refills: 2 | Status: SHIPPED | OUTPATIENT
Start: 2018-02-16 | End: 2019-02-20 | Stop reason: SDUPTHER

## 2018-03-05 ENCOUNTER — OFFICE (OUTPATIENT)
Dept: URBAN - METROPOLITAN AREA OTHER 6 | Facility: OTHER | Age: 73
End: 2018-03-05
Payer: OTHER GOVERNMENT

## 2018-03-05 VITALS
SYSTOLIC BLOOD PRESSURE: 136 MMHG | DIASTOLIC BLOOD PRESSURE: 72 MMHG | HEIGHT: 61 IN | HEART RATE: 64 BPM | WEIGHT: 204 LBS

## 2018-03-05 DIAGNOSIS — Z86.010 PERSONAL HISTORY OF COLONIC POLYPS: ICD-10-CM

## 2018-03-05 DIAGNOSIS — K31.819 ANGIODYSPLASIA OF STOMACH AND DUODENUM WITHOUT BLEEDING: ICD-10-CM

## 2018-03-05 PROCEDURE — 99212 OFFICE O/P EST SF 10 MIN: CPT | Performed by: INTERNAL MEDICINE

## 2018-07-07 RX ORDER — ATORVASTATIN CALCIUM 40 MG/1
TABLET, FILM COATED ORAL
Qty: 90 TABLET | Refills: 0 | Status: SHIPPED | OUTPATIENT
Start: 2018-07-07 | End: 2018-10-05 | Stop reason: SDUPTHER

## 2018-07-07 NOTE — TELEPHONE ENCOUNTER
Received rx for Atorvastatin 40 mg qd for Dr. Arenas for CAD/ Hyperlipidemia.  Last chol labs on 2/08/17 by pcp.  Pt has an OV scheduled with you on 07/16/18.  Do you want to order for pt to have labs done prior, at OV or defer to pcp?

## 2018-07-09 ENCOUNTER — TELEPHONE (OUTPATIENT)
Dept: CARDIOLOGY | Facility: CLINIC | Age: 73
End: 2018-07-09

## 2018-07-09 NOTE — TELEPHONE ENCOUNTER
Her PCP checked last lipid panel.  Please call her and ask her if she wants to have the lipid panel drawn here.  He forgot to continue to refill her atorvastatin she needs to have it done in the near future.

## 2018-07-09 NOTE — TELEPHONE ENCOUNTER
"  You   Emily Gaytan MA Just now (2:22 PM)        Her PCP checked last lipid panel.  Please call her and ask her if she wants to have the lipid panel drawn here.  He forgot to continue to refill her atorvastatin she needs to have it done in the near future.         Documentation       Emily Gaytan MA routed conversation to You 2 days ago      Jonathan, Ara King 2 days ago           - atorvastatin (LIPITOR) 40 MG tablet     Your prescription(s) have been approved but may not be ready for pick-up.  Please call or wait for confirmation from your doctor's office or pharmacy before arriving to  your prescription.     If you have any questions about your prescription, please <a [email or web address]">send a message </a> to your doctor.            This [employer] message has not been read.      Emily Gaytan MA 2 days ago        Received rx for Atorvastatin 40 mg qd for Dr. Arenas for CAD/ Hyperlipidemia.  Last chol labs on 2/08/17 by pcp.  Pt has an OV scheduled with you on 07/16/18.  Do you want to order for pt to have labs done prior, at OV or defer to pcp?           "

## 2018-07-16 ENCOUNTER — OFFICE VISIT (OUTPATIENT)
Dept: CARDIOLOGY | Facility: CLINIC | Age: 73
End: 2018-07-16

## 2018-07-16 ENCOUNTER — TELEPHONE (OUTPATIENT)
Dept: CARDIOLOGY | Facility: CLINIC | Age: 73
End: 2018-07-16

## 2018-07-16 ENCOUNTER — LAB (OUTPATIENT)
Dept: LAB | Facility: HOSPITAL | Age: 73
End: 2018-07-16

## 2018-07-16 VITALS
WEIGHT: 258.4 LBS | SYSTOLIC BLOOD PRESSURE: 130 MMHG | HEART RATE: 63 BPM | HEIGHT: 61 IN | BODY MASS INDEX: 48.79 KG/M2 | DIASTOLIC BLOOD PRESSURE: 80 MMHG

## 2018-07-16 DIAGNOSIS — I42.9 CARDIOMYOPATHY, UNSPECIFIED TYPE (HCC): ICD-10-CM

## 2018-07-16 DIAGNOSIS — R06.02 SHORTNESS OF BREATH: ICD-10-CM

## 2018-07-16 DIAGNOSIS — I10 ESSENTIAL HYPERTENSION: ICD-10-CM

## 2018-07-16 DIAGNOSIS — I25.10 CORONARY ARTERY DISEASE INVOLVING NATIVE CORONARY ARTERY OF NATIVE HEART WITHOUT ANGINA PECTORIS: Primary | ICD-10-CM

## 2018-07-16 DIAGNOSIS — IMO0001 CLASS 3 OBESITY DUE TO EXCESS CALORIES WITH SERIOUS COMORBIDITY AND BODY MASS INDEX (BMI) OF 45.0 TO 49.9 IN ADULT: ICD-10-CM

## 2018-07-16 DIAGNOSIS — Z95.1 S/P CABG (CORONARY ARTERY BYPASS GRAFT): ICD-10-CM

## 2018-07-16 DIAGNOSIS — G47.33 OBSTRUCTIVE APNEA: ICD-10-CM

## 2018-07-16 LAB
ANION GAP SERPL CALCULATED.3IONS-SCNC: 12.8 MMOL/L
BUN BLD-MCNC: 13 MG/DL (ref 8–23)
BUN/CREAT SERPL: 16.3 (ref 7–25)
CALCIUM SPEC-SCNC: 9.1 MG/DL (ref 8.6–10.5)
CHLORIDE SERPL-SCNC: 108 MMOL/L (ref 98–107)
CO2 SERPL-SCNC: 23.2 MMOL/L (ref 22–29)
CREAT BLD-MCNC: 0.8 MG/DL (ref 0.57–1)
GFR SERPL CREATININE-BSD FRML MDRD: 71 ML/MIN/1.73
GLUCOSE BLD-MCNC: 115 MG/DL (ref 65–99)
POTASSIUM BLD-SCNC: 4.4 MMOL/L (ref 3.5–5.2)
SODIUM BLD-SCNC: 144 MMOL/L (ref 136–145)

## 2018-07-16 PROCEDURE — 80048 BASIC METABOLIC PNL TOTAL CA: CPT

## 2018-07-16 PROCEDURE — 93000 ELECTROCARDIOGRAM COMPLETE: CPT | Performed by: NURSE PRACTITIONER

## 2018-07-16 PROCEDURE — 99214 OFFICE O/P EST MOD 30 MIN: CPT | Performed by: NURSE PRACTITIONER

## 2018-07-16 NOTE — TELEPHONE ENCOUNTER
Please call patient and tell her that her BMP was normal except for mildly elevated blood glucose and chloride level which have been chronically elevated for some time.  Follow-up with PCP.  Kidney function and potassium are both good.

## 2018-10-05 RX ORDER — ATORVASTATIN CALCIUM 40 MG/1
TABLET, FILM COATED ORAL
Qty: 90 TABLET | Refills: 0 | Status: SHIPPED | OUTPATIENT
Start: 2018-10-05 | End: 2019-01-03 | Stop reason: SDUPTHER

## 2018-12-18 RX ORDER — LOSARTAN POTASSIUM 100 MG/1
TABLET ORAL
Qty: 90 TABLET | Refills: 3 | Status: SHIPPED | OUTPATIENT
Start: 2018-12-18 | End: 2019-11-25 | Stop reason: SDUPTHER

## 2019-01-03 RX ORDER — ATORVASTATIN CALCIUM 40 MG/1
TABLET, FILM COATED ORAL
Qty: 90 TABLET | Refills: 0 | Status: SHIPPED | OUTPATIENT
Start: 2019-01-03 | End: 2019-04-03 | Stop reason: SDUPTHER

## 2019-01-17 ENCOUNTER — OFFICE VISIT (OUTPATIENT)
Dept: FAMILY MEDICINE CLINIC | Facility: CLINIC | Age: 74
End: 2019-01-17

## 2019-01-17 ENCOUNTER — TELEPHONE (OUTPATIENT)
Dept: CARDIOLOGY | Facility: CLINIC | Age: 74
End: 2019-01-17

## 2019-01-17 ENCOUNTER — HOSPITAL ENCOUNTER (OUTPATIENT)
Dept: GENERAL RADIOLOGY | Facility: HOSPITAL | Age: 74
Discharge: HOME | End: 2019-01-17
Attending: INTERNAL MEDICINE

## 2019-01-17 ENCOUNTER — HOSPITAL ENCOUNTER (OUTPATIENT)
Dept: NUCLEAR MEDICINE | Facility: HOSPITAL | Age: 74
Discharge: HOME | End: 2019-01-17
Attending: INTERNAL MEDICINE

## 2019-01-17 ENCOUNTER — HOSPITAL ENCOUNTER (OUTPATIENT)
Dept: CARDIOLOGY | Facility: HOSPITAL | Age: 74
Discharge: HOME | End: 2019-01-17
Admitting: INTERNAL MEDICINE

## 2019-01-17 VITALS
BODY MASS INDEX: 49.47 KG/M2 | DIASTOLIC BLOOD PRESSURE: 80 MMHG | HEART RATE: 56 BPM | RESPIRATION RATE: 20 BRPM | HEIGHT: 61 IN | TEMPERATURE: 98 F | SYSTOLIC BLOOD PRESSURE: 130 MMHG | OXYGEN SATURATION: 97 % | WEIGHT: 262 LBS

## 2019-01-17 VITALS
HEIGHT: 61 IN | DIASTOLIC BLOOD PRESSURE: 93 MMHG | OXYGEN SATURATION: 97 % | SYSTOLIC BLOOD PRESSURE: 154 MMHG | HEART RATE: 65 BPM | BODY MASS INDEX: 49.47 KG/M2 | WEIGHT: 262 LBS | RESPIRATION RATE: 22 BRPM

## 2019-01-17 DIAGNOSIS — R79.89 POSITIVE D DIMER: Primary | ICD-10-CM

## 2019-01-17 DIAGNOSIS — R06.09 EXERTIONAL DYSPNEA: ICD-10-CM

## 2019-01-17 DIAGNOSIS — Z00.00 MEDICARE ANNUAL WELLNESS VISIT, SUBSEQUENT: Primary | ICD-10-CM

## 2019-01-17 DIAGNOSIS — E66.01 MORBID OBESITY WITH BMI OF 45.0-49.9, ADULT (HCC): ICD-10-CM

## 2019-01-17 DIAGNOSIS — R73.9 HYPERGLYCEMIA: ICD-10-CM

## 2019-01-17 DIAGNOSIS — R07.9 CHEST PAIN, UNSPECIFIED TYPE: ICD-10-CM

## 2019-01-17 DIAGNOSIS — R79.89 POSITIVE D DIMER: ICD-10-CM

## 2019-01-17 DIAGNOSIS — I25.10 CORONARY ARTERY DISEASE INVOLVING NATIVE CORONARY ARTERY OF NATIVE HEART WITHOUT ANGINA PECTORIS: ICD-10-CM

## 2019-01-17 DIAGNOSIS — R06.02 SHORTNESS OF BREATH: ICD-10-CM

## 2019-01-17 DIAGNOSIS — E78.5 DYSLIPIDEMIA: ICD-10-CM

## 2019-01-17 DIAGNOSIS — R06.09 DYSPNEA ON EXERTION: ICD-10-CM

## 2019-01-17 DIAGNOSIS — I10 ESSENTIAL HYPERTENSION: ICD-10-CM

## 2019-01-17 LAB
ALBUMIN SERPL-MCNC: 3.8 G/DL (ref 3.5–5.2)
ALBUMIN SERPL-MCNC: 4.1 G/DL (ref 3.5–5.2)
ALBUMIN/GLOB SERPL: 1 G/DL
ALBUMIN/GLOB SERPL: 1.2 G/DL
ALP SERPL-CCNC: 114 U/L (ref 39–117)
ALP SERPL-CCNC: 120 U/L (ref 39–117)
ALT SERPL W P-5'-P-CCNC: 18 U/L (ref 1–33)
ALT SERPL W P-5'-P-CCNC: 20 U/L (ref 1–33)
ANION GAP SERPL CALCULATED.3IONS-SCNC: 12.2 MMOL/L
ANION GAP SERPL CALCULATED.3IONS-SCNC: 14.1 MMOL/L
AST SERPL-CCNC: 14 U/L (ref 1–32)
AST SERPL-CCNC: 18 U/L (ref 1–32)
BASOPHILS # BLD AUTO: 0.02 10*3/MM3 (ref 0–0.2)
BASOPHILS NFR BLD AUTO: 0.3 % (ref 0–1.5)
BILIRUB SERPL-MCNC: 0.5 MG/DL (ref 0.1–1.2)
BILIRUB SERPL-MCNC: 0.7 MG/DL (ref 0.1–1.2)
BUN BLD-MCNC: 14 MG/DL (ref 8–23)
BUN BLD-MCNC: 15 MG/DL (ref 8–23)
BUN/CREAT SERPL: 17.9 (ref 7–25)
BUN/CREAT SERPL: 19 (ref 7–25)
CALCIUM SPEC-SCNC: 9 MG/DL (ref 8.6–10.5)
CALCIUM SPEC-SCNC: 9.2 MG/DL (ref 8.6–10.5)
CHLORIDE SERPL-SCNC: 107 MMOL/L (ref 98–107)
CHLORIDE SERPL-SCNC: 108 MMOL/L (ref 98–107)
CHOLEST SERPL-MCNC: 132 MG/DL (ref 0–200)
CO2 SERPL-SCNC: 20.9 MMOL/L (ref 22–29)
CO2 SERPL-SCNC: 24.8 MMOL/L (ref 22–29)
CREAT BLD-MCNC: 0.78 MG/DL (ref 0.57–1)
CREAT BLD-MCNC: 0.79 MG/DL (ref 0.57–1)
D DIMER PPP FEU-MCNC: 0.63 MCGFEU/ML (ref 0–0.49)
DEPRECATED RDW RBC AUTO: 44.8 FL (ref 37–54)
EOSINOPHIL # BLD AUTO: 0.1 10*3/MM3 (ref 0–0.7)
EOSINOPHIL NFR BLD AUTO: 1.4 % (ref 0.3–6.2)
ERYTHROCYTE [DISTWIDTH] IN BLOOD BY AUTOMATED COUNT: 13.7 % (ref 11.7–13)
ERYTHROCYTE [DISTWIDTH] IN BLOOD BY AUTOMATED COUNT: 13.7 % (ref 4.5–15)
GFR SERPL CREATININE-BSD FRML MDRD: 72 ML/MIN/1.73
GFR SERPL CREATININE-BSD FRML MDRD: 73 ML/MIN/1.73
GLOBULIN UR ELPH-MCNC: 3.4 GM/DL
GLOBULIN UR ELPH-MCNC: 3.8 GM/DL
GLUCOSE BLD-MCNC: 122 MG/DL (ref 65–99)
GLUCOSE BLD-MCNC: 124 MG/DL (ref 65–99)
HBA1C MFR BLD: 5.6 % (ref 4.8–5.6)
HCT VFR BLD AUTO: 45.8 % (ref 31–42)
HCT VFR BLD AUTO: 47.3 % (ref 35.6–45.5)
HDLC SERPL-MCNC: 40 MG/DL (ref 40–60)
HGB BLD-MCNC: 15 G/DL (ref 11.9–15.5)
HGB BLD-MCNC: 15.5 G/DL (ref 12–18)
IMM GRANULOCYTES # BLD AUTO: 0 10*3/MM3 (ref 0–0.03)
IMM GRANULOCYTES NFR BLD AUTO: 0 % (ref 0–0.5)
LDLC SERPL CALC-MCNC: 75 MG/DL (ref 0–100)
LDLC/HDLC SERPL: 1.88 {RATIO}
LYMPHOCYTES # BLD AUTO: 1.2 10*3/MM3 (ref 1.2–3.4)
LYMPHOCYTES # BLD AUTO: 1.31 10*3/MM3 (ref 0.9–4.8)
LYMPHOCYTES NFR BLD AUTO: 18.2 % (ref 19.6–45.3)
LYMPHOCYTES NFR BLD AUTO: 18.5 % (ref 21–51)
MCH RBC QN AUTO: 28.4 PG (ref 26.9–32)
MCH RBC QN AUTO: 29.3 PG (ref 26.1–33.1)
MCHC RBC AUTO-ENTMCNC: 31.7 G/DL (ref 32.4–36.3)
MCHC RBC AUTO-ENTMCNC: 33.8 G/DL (ref 33–37)
MCV RBC AUTO: 86.6 FL (ref 80–99)
MCV RBC AUTO: 89.4 FL (ref 80.5–98.2)
MONOCYTES # BLD AUTO: 0.3 10*3/MM3 (ref 0.1–0.6)
MONOCYTES # BLD AUTO: 0.59 10*3/MM3 (ref 0.2–1.2)
MONOCYTES NFR BLD AUTO: 3.8 % (ref 2–9)
MONOCYTES NFR BLD AUTO: 8.2 % (ref 5–12)
NEUTROPHILS # BLD AUTO: 5.1 10*3/MM3 (ref 1.4–6.5)
NEUTROPHILS # BLD AUTO: 5.16 10*3/MM3 (ref 1.9–8.1)
NEUTROPHILS NFR BLD AUTO: 71.9 % (ref 42.7–76)
NEUTROPHILS NFR BLD AUTO: 77.7 % (ref 42–75)
NT-PROBNP SERPL-MCNC: 110.2 PG/ML (ref 0–900)
PLATELET # BLD AUTO: 149 10*3/MM3 (ref 150–450)
PLATELET # BLD AUTO: 177 10*3/MM3 (ref 140–500)
PMV BLD AUTO: 11.4 FL (ref 6–12)
PMV BLD AUTO: 9.5 FL (ref 7.1–10.5)
POTASSIUM BLD-SCNC: 4.2 MMOL/L (ref 3.5–5.2)
POTASSIUM BLD-SCNC: 4.4 MMOL/L (ref 3.5–5.2)
PROT SERPL-MCNC: 7.5 G/DL (ref 6–8.5)
PROT SERPL-MCNC: 7.6 G/DL (ref 6–8.5)
RBC # BLD AUTO: 5.29 10*6/MM3 (ref 3.9–5.2)
RBC # BLD AUTO: 5.29 10*6/MM3 (ref 4–6)
SODIUM BLD-SCNC: 143 MMOL/L (ref 136–145)
SODIUM BLD-SCNC: 144 MMOL/L (ref 136–145)
TRIGL SERPL-MCNC: 85 MG/DL (ref 0–150)
TROPONIN T SERPL-MCNC: <0.01 NG/ML (ref 0–0.03)
VLDLC SERPL-MCNC: 17 MG/DL (ref 5–40)
WBC NRBC COR # BLD: 6.6 10*3/MM3 (ref 4.5–10)
WBC NRBC COR # BLD: 7.18 10*3/MM3 (ref 4.5–10.7)

## 2019-01-17 PROCEDURE — 85379 FIBRIN DEGRADATION QUANT: CPT | Performed by: INTERNAL MEDICINE

## 2019-01-17 PROCEDURE — A9540 TC99M MAA: HCPCS | Performed by: INTERNAL MEDICINE

## 2019-01-17 PROCEDURE — 84484 ASSAY OF TROPONIN QUANT: CPT | Performed by: INTERNAL MEDICINE

## 2019-01-17 PROCEDURE — 0 XENON XE 133: Performed by: INTERNAL MEDICINE

## 2019-01-17 PROCEDURE — 80053 COMPREHEN METABOLIC PANEL: CPT | Performed by: FAMILY MEDICINE

## 2019-01-17 PROCEDURE — 83036 HEMOGLOBIN GLYCOSYLATED A1C: CPT | Performed by: FAMILY MEDICINE

## 2019-01-17 PROCEDURE — 85025 COMPLETE CBC W/AUTO DIFF WBC: CPT | Performed by: FAMILY MEDICINE

## 2019-01-17 PROCEDURE — 80053 COMPREHEN METABOLIC PANEL: CPT | Performed by: INTERNAL MEDICINE

## 2019-01-17 PROCEDURE — 85025 COMPLETE CBC W/AUTO DIFF WBC: CPT | Performed by: INTERNAL MEDICINE

## 2019-01-17 PROCEDURE — 99214 OFFICE O/P EST MOD 30 MIN: CPT | Performed by: FAMILY MEDICINE

## 2019-01-17 PROCEDURE — 94760 N-INVAS EAR/PLS OXIMETRY 1: CPT

## 2019-01-17 PROCEDURE — 71046 X-RAY EXAM CHEST 2 VIEWS: CPT

## 2019-01-17 PROCEDURE — 80061 LIPID PANEL: CPT | Performed by: FAMILY MEDICINE

## 2019-01-17 PROCEDURE — 83880 ASSAY OF NATRIURETIC PEPTIDE: CPT | Performed by: INTERNAL MEDICINE

## 2019-01-17 PROCEDURE — G0439 PPPS, SUBSEQ VISIT: HCPCS | Performed by: FAMILY MEDICINE

## 2019-01-17 PROCEDURE — A9558 XE133 XENON 10MCI: HCPCS | Performed by: INTERNAL MEDICINE

## 2019-01-17 PROCEDURE — 36415 COLL VENOUS BLD VENIPUNCTURE: CPT | Performed by: FAMILY MEDICINE

## 2019-01-17 PROCEDURE — 36415 COLL VENOUS BLD VENIPUNCTURE: CPT

## 2019-01-17 PROCEDURE — 0 TECHNETIUM ALBUMIN AGGREGATED: Performed by: INTERNAL MEDICINE

## 2019-01-17 PROCEDURE — 99214 OFFICE O/P EST MOD 30 MIN: CPT | Performed by: INTERNAL MEDICINE

## 2019-01-17 PROCEDURE — 78582 LUNG VENTILAT&PERFUS IMAGING: CPT

## 2019-01-17 PROCEDURE — 93000 ELECTROCARDIOGRAM COMPLETE: CPT | Performed by: FAMILY MEDICINE

## 2019-01-17 RX ORDER — SODIUM CHLORIDE 0.9 % (FLUSH) 0.9 %
10 SYRINGE (ML) INJECTION AS NEEDED
Status: DISCONTINUED | OUTPATIENT
Start: 2019-01-17 | End: 2019-02-20

## 2019-01-17 RX ORDER — NITROGLYCERIN 0.4 MG/1
0.4 TABLET SUBLINGUAL
Status: DISCONTINUED | OUTPATIENT
Start: 2019-01-17 | End: 2019-02-20

## 2019-01-17 RX ADMIN — Medication 1 DOSE: at 16:05

## 2019-01-17 RX ADMIN — XENON XE-133 8.9 MILLICURIE: 10 GAS RESPIRATORY (INHALATION) at 16:05

## 2019-01-17 NOTE — PATIENT INSTRUCTIONS
Medicare Wellness  Personal Prevention Plan of Service     Date of Office Visit:  2019  Encounter Provider:  Donnell Lee MD  Place of Service:  [facility]  Patient Name: Julee King  :  1945    As part of the Medicare Wellness portion of your visit today, we are providing you with this personalized preventive plan of services (PPPS). This plan is based upon recommendations of the [place] Preventive Services Task Force (USPSTF) and the Advisory Committee on Immunization Practices (ACIP).    This lists the preventive care services that should be considered, and provides dates of when you are due. Items listed as completed are up-to-date and do not require any further intervention.    Health Maintenance   Topic Date Due   • ZOSTER VACCINE (2 of 2) 2017   • LIPID PANEL  2018   • MEDICARE ANNUAL WELLNESS  2018   • PNEUMOCOCCAL VACCINES (65+ LOW/MEDIUM RISK) (2 of 2 - PPSV23) 2018   • MAMMOGRAM  2019   • COLONOSCOPY  2026   • TDAP/TD VACCINES (2 - Td) 2027   • HEPATITIS C SCREENING  Addressed   • INFLUENZA VACCINE  Addressed       Orders Placed This Encounter   Procedures   • Comprehensive Metabolic Panel   • Lipid Panel   • Hemoglobin A1c   • CBC Auto Differential   • ECG 12 Lead     This order was created via procedure documentation   • CBC & Differential     Order Specific Question:   Manual Differential     Answer:   No       No Follow-up on file.

## 2019-01-17 NOTE — PROGRESS NOTES
"SUBJECTIVE:  The patient is a 73-year-old white female is here for follow-up.  She has a history of coronary artery disease and is status post CABG.  She has a history of hyperlipidemia and hypertension.  She complains of shortness of breath with exertion.  On occasion she feels pressure in her chest.  She felt some pressure in her chest earlier this morning.  She feels she has \"crackling sensation\" over the surgical wound on her sternum.  No diaphoresis or syncope.    PAST MEDICAL HISTORY:  Reviewed.    REVIEW OF SYSTEMS:  Please see above; 14 point ROS negative.      OBJECTIVE: Vitals signs are reviewed and are stable.    HEENT: PERRLA.   Neck:  Supple.   Lungs:  Clear.    Heart:  Regular rate and rhythm.   Abdomen:   Soft, nontender.   Extremities:  No cyanosis, clubbing or edema.       ECG 12 Lead  Date/Time: 1/17/2019 11:32 AM  Performed by: Donnell Lee MD  Authorized by: Donnell Lee MD   Rhythm: sinus rhythm  Clinical impression: myocardial ischemia          EKG is done here and interpreted by me.  Indication dyspnea with exertion.  EKG compared to July 2018..  EKG shows sinus rhythm with T wave inversion over the anterior precordial leads.  The EKG is essentially unchanged from previous.  ASSESSMENT:    Coronary artery disease  Exertional dyspnea  Chest pain  Morbid obesity  Hyperlipidemia  Hypertension    PLAN: Patient is advised to have breast exam and mammogram.  She will be referred to a chest pain clinic for exertional dyspnea and chest pain today CMP fasting lipid hemoglobin A1c CBC ordered.  [place] lifestyle discussed.  "

## 2019-01-17 NOTE — PROGRESS NOTES
QUICK REFERENCE INFORMATION:  The ABCs of the Annual Wellness Visit    Subsequent Medicare Wellness Visit    HEALTH RISK ASSESSMENT    1945    Recent Hospitalizations:  No hospitalization(s) within the last year..        Current Medical Providers:  Patient Care Team:  Donnell Lee MD as PCP - General  Donnell Lee MD as PCP - Family Medicine  Anastasiia Callahan MD as Consulting Physician (Cardiology)  Alvarado Rivera MD as Consulting Physician (Gastroenterology)  Bianka Galaviz MD as Consulting Physician (Pulmonary Disease)        Smoking Status:  Social History     Tobacco Use   Smoking Status Never Smoker   Smokeless Tobacco Never Used   Tobacco Comment    caffeine use       Alcohol Consumption:  Social History     Substance and Sexual Activity   Alcohol Use Yes    Comment: OCCASIONALLY       Depression Screen:   PHQ-2/PHQ-9 Depression Screening 1/17/2019   Little interest or pleasure in doing things 0   Feeling down, depressed, or hopeless 0   Trouble falling or staying asleep, or sleeping too much 0   Feeling tired or having little energy 0   Poor appetite or overeating 0   Feeling bad about yourself - or that you are a failure or have let yourself or your family down 0   Trouble concentrating on things, such as reading the newspaper or watching television 0   Moving or speaking so slowly that other people could have noticed. Or the opposite - being so fidgety or restless that you have been moving around a lot more than usual 0   Thoughts that you would be better off dead, or of hurting yourself in some way 0   Total Score 0   If you checked off any problems, how difficult have these problems made it for you to do your work, take care of things at home, or get along with other people? Not difficult at all       Health Habits and Functional and Cognitive Screening:  Functional & Cognitive Status 1/17/2019   Do you have difficulty preparing food and eating? No   Do you have difficulty bathing  yourself, getting dressed or grooming yourself? No   Do you have difficulty using the toilet? No   Do you have difficulty moving around from place to place? No   Do you have trouble with steps or getting out of a bed or a chair? No   In the past year have you fallen or experienced a near fall? No   Current Diet Limited Junk Food   Dental Exam Not up to date   Eye Exam Not up to date   Exercise (times per week) 1 times per week   Current Exercise Activities Include Housecleaning   Do you need help using the phone?  No   Are you deaf or do you have serious difficulty hearing?  No   Do you need help with transportation? No   Do you need help shopping? No   Do you need help preparing meals?  No   Do you need help with housework?  No   Do you need help with laundry? No   Do you need help taking your medications? No   Do you need help managing money? No   Do you ever drive or ride in a car without wearing a seat belt? No   Have you felt unusual stress, anger or loneliness in the last month? No   Who do you live with? Spouse   If you need help, do you have trouble finding someone available to you? No   Have you been bothered in the last four weeks by sexual problems? No   Do you have difficulty concentrating, remembering or making decisions? No           Does the patient have evidence of cognitive impairment? No    Aspirin use counseling: Taking ASA appropriately as indicated      Recent Lab Results:  CMP:  Lab Results   Component Value Date    BUN 13 07/16/2018    CREATININE 0.80 07/16/2018    EGFRIFNONA 71 07/16/2018    EGFRIFAFRI  09/19/2016      Comment:      <15 Indicative of kidney failure.    BCR 16.3 07/16/2018     07/16/2018    K 4.4 07/16/2018    CO2 23.2 07/16/2018    CALCIUM 9.1 07/16/2018    ALBUMIN 4.10 12/07/2017    BILITOT 0.9 12/07/2017    ALKPHOS 122 (H) 12/07/2017    AST 14 12/07/2017    ALT 14 12/07/2017     Lipid Panel:  Lab Results   Component Value Date    CHOL 119 02/08/2017    TRIG 97  02/08/2017    HDL 40 02/08/2017    VLDL 19.4 02/08/2017    LDLHDL 1.49 02/08/2017     HbA1c:  Lab Results   Component Value Date    HGBA1C 5.78 (H) 05/24/2017       Visual Acuity:  No exam data present    Age-appropriate Screening Schedule:  Refer to the list below for future screening recommendations based on patient's age, sex and/or medical conditions. Orders for these recommended tests are listed in the plan section. The patient has been provided with a written plan.    Health Maintenance   Topic Date Due   • ZOSTER VACCINE (2 of 2) 07/19/2017   • LIPID PANEL  02/08/2018   • PNEUMOCOCCAL VACCINES (65+ LOW/MEDIUM RISK) (2 of 2 - PPSV23) 05/24/2018   • MAMMOGRAM  05/24/2019   • COLONOSCOPY  06/08/2026   • TDAP/TD VACCINES (2 - Td) 05/24/2027   • INFLUENZA VACCINE  Addressed        Subjective   History of Present Illness    Julee King is a 73 y.o. female who presents for an Subsequent Wellness Visit.    The following portions of the patient's history were reviewed and updated as appropriate: past family history and past medical history.    Outpatient Medications Prior to Visit   Medication Sig Dispense Refill   • aspirin 81 MG tablet Take 1 tablet by mouth daily.     • atorvastatin (LIPITOR) 40 MG tablet TAKE 1 TABLET DAILY (NEEDS CHOLESTEROL LABS BEFORE FURTHER REFILLS) 90 tablet 0   • carvedilol (COREG) 12.5 MG tablet TAKE 1 TABLET TWICE A  tablet 2   • losartan (COZAAR) 100 MG tablet TAKE 1 TABLET EVERY NIGHT 90 tablet 3   • NON FORMULARY C PAP     • pantoprazole (PROTONIX) 40 MG EC tablet TAKE 1 TABLET DAILY 90 tablet 2   • albuterol (PROVENTIL HFA;VENTOLIN HFA) 108 (90 BASE) MCG/ACT inhaler Inhale 2 puffs Every 4 (Four) Hours As Needed for wheezing. 1 inhaler 11     No facility-administered medications prior to visit.        Patient Active Problem List   Diagnosis   • Coronary artery disease involving native coronary artery of native heart without angina pectoris   • Cardiomyopathy (CMS/HCC)   •  "Obstructive apnea   • Obesity   • Hypokalemia   • Hypocalcemia   • Hypertension   • Dyslipidemia   • Shortness of breath   • S/P CABG (coronary artery bypass graft)       Advance Care Planning:  has an advance directive - a copy HAS NOT been provided    Identification of Risk Factors:  Risk factors include: increased fall risk.    Review of Systems    Compared to one year ago, the patient feels her physical health is worse.  Compared to one year ago, the patient feels her mental health is the same.    Objective     Physical Exam    Vitals:    01/17/19 1111   BP: 130/80   BP Location: Left arm   Patient Position: Sitting   Pulse: 56   Resp: 20   Temp: 98 °F (36.7 °C)   TempSrc: Oral   SpO2: 97%   Weight: 119 kg (262 lb)   Height: 154.9 cm (61\")   PainSc: 0-No pain       Patient's Body mass index is 49.5 kg/m². BMI is above normal parameters. Recommendations include: no follow-up required.      Assessment/Plan   Patient Self-Management and Personalized Health Advice  The patient has been provided with information about: fall prevention and preventive services including:   · Fall Risk assessment done.    Visit Diagnoses:    ICD-10-CM ICD-9-CM   1. Medicare annual wellness visit, subsequent Z00.00 V70.0   2. Coronary artery disease involving native coronary artery of native heart without angina pectoris I25.10 414.01   3. Essential hypertension I10 401.9   4. Dyslipidemia E78.5 272.4   5. Hyperglycemia R73.9 790.29   6. Morbid obesity with BMI of 45.0-49.9, adult (CMS/[facility]) E66.01 278.01    Z68.42 V85.42   7. Exertional dyspnea R06.09 786.09       Orders Placed This Encounter   Procedures   • Comprehensive Metabolic Panel   • Lipid Panel   • Hemoglobin A1c   • CBC Auto Differential   • ECG 12 Lead     This order was created via procedure documentation   • CBC & Differential     Order Specific Question:   Manual Differential     Answer:   No       Outpatient Encounter Medications as of 1/17/2019   Medication Sig Dispense " Refill   • aspirin 81 MG tablet Take 1 tablet by mouth daily.     • atorvastatin (LIPITOR) 40 MG tablet TAKE 1 TABLET DAILY (NEEDS CHOLESTEROL LABS BEFORE FURTHER REFILLS) 90 tablet 0   • carvedilol (COREG) 12.5 MG tablet TAKE 1 TABLET TWICE A  tablet 2   • losartan (COZAAR) 100 MG tablet TAKE 1 TABLET EVERY NIGHT 90 tablet 3   • NON FORMULARY C PAP     • pantoprazole (PROTONIX) 40 MG EC tablet TAKE 1 TABLET DAILY 90 tablet 2   • albuterol (PROVENTIL HFA;VENTOLIN HFA) 108 (90 BASE) MCG/ACT inhaler Inhale 2 puffs Every 4 (Four) Hours As Needed for wheezing. 1 inhaler 11     No facility-administered encounter medications on file as of 1/17/2019.        Reviewed use of high risk medication in the elderly: not applicable  Reviewed for potential of harmful drug interactions in the elderly: not applicable    Follow Up:  No Follow-up on file.     An After Visit Summary and PPPS with all of these plans were given to the patient.         Answers for HPI/ROS submitted by the patient on 1/15/2019   Shortness of breath  Chronicity: recurrent  Onset: more than 1 year ago  Frequency: daily  Progression since onset: unchanged  Episode duration: 15 minutes  claudication: No  coryza: Yes  hemoptysis: No  leg pain: No  orthopnea: Yes  PND: No  sputum production: No  swollen glands: No  syncope: No  Aggravating factors: any activity

## 2019-01-17 NOTE — PROGRESS NOTES
Date of Office Visit: 2019  Encounter Provider: Anastasiia Callahan MD  Place of Service: [facility] CARDIOLOGY  Patient Name: Julee King  :1945      Patient ID:  Julee King is a 73 y.o. female is here for chest pain.           History of Present Illness    She is followed for CAD, s/p CABG.      She came in with midsternal chest pain 2014 to [facility]. She had known history  of hypertension, hyperlipidemia, and had had a history of mild obstructive sleep apnea,  but was not using a CPAP machine. She was exercising at the [facility] 3 times a week and when  she would get on the treadmill she would notice this chest tightness. When we saw her in  the chest pain unit her blood pressure was a bit elevated. We ordered a couple of studies.  She had a 2-D echocardiogram with Doppler done 2014 showing ejection fraction of 65%  with grade I diastolic dysfunction and mild left ventricular hypertrophy. She also had a  PET stress study, which was done 2014, which showed no evidence of ischemia. We then  made a referral to the [facility] at [facility]. She was found  to have obstructive sleep apnea, which was moderate to severe, with recommendation of a  CPAP, which she is currently using. She says she is having some difficulty tolerating it,  but she is going to try to stick with it.       She then presented to the hospital 2016 with chest pain and pressure. She was severely anemic and found to have gastrointestinal bleeding. She did receive an transfusions. Her troponin was elevated at 4. Her echocardiogram showed left ventricular ejection fraction of 33% with mild to moderate mitral insufficiency. She went on to have a cardiac catheterization done which showed 80% distal left main stenosis, 60% distal first obtuse marginal stenosis, 30% ramus mid vessel stenosis, 30% proximal LAD stenosis, 100% occluded first diagonal branch, 70%  mid LAD stenosis, 100% proximal RCA stenosis and 95% distal RCA stenosis. She went on to have coronary bypass grafting 1/22/2016. She received LIMA to LAD, SVG to obtuse marginal 1, SVG to obtuse marginal 2 and SVG to LV branch. She also had endoscopy Dr. Senior done 1/24/2016 showing small hiatal hernia, normal stomach and duodenal.       Her carotid study done 1/25/2016 shows 16-49% stenosis of left internal carotid artery. The right internal carotid artery is patent.  She had a normal carotid duplex done January 2017.  She had an echocardiogram done July 2017 which showed ejection fraction 54% and was otherwise normal.    She had an echo cardiac exam done 7/24/17 showed ejection fraction 54%.      She sees Dr. Galaviz for LOVE. She has a colonoscopy 6/2016 with Dr. Rivera and it was good.     She came in today with dyspnea on exertion.  Her troponin, CMP, CBC, proBNP are normal and her d-dimer is mildly elevated.  Her dyspnea has been going on for 6 weeks.  She is stopped exercising.  It is exertional.  She gets some mild chest heaviness with it.  She doesn't feel her heart racing or skipping.  She's had no dizziness, syncope or falls.  She has no lower extremity edema.  She has no orthopnea.  Her energy level somewhat low.  She's been taking her medications as directed.  She did stop her diuretic.          Past Medical History:   Diagnosis Date   • Bronchitis 05/2016   • Carotid artery disease (CMS/[facility])     16-49% stenosis of the left internal carotid 2016; carotid duplex 1/2017 - normal   • Colon polyps    • Coronary artery disease 01/2016    Non-STEMI and status post CABG   • Dyslipidemia    • GI bleed     Severely anemic and received a blood transfusion   • Hiatal hernia    • Hypertension    • Hypocalcemia    • Hypokalemia    • Non-STEMI (non-ST elevated myocardial infarction) (CMS/[facility]) 01/2016   • Obesity    • LOVE (obstructive sleep apnea)     moderate to severe; compliant with CPAP machine         Past Surgical  History:   Procedure Laterality Date   • CHOLECYSTECTOMY     • COLONOSCOPY N/A 6/8/2016    Procedure: COLONOSCOPY to cecum with cold biopsy polypectomies;  Surgeon: Alvarado Rivera MD;  Location: [facility] ENDOSCOPY;  Service:    • CORONARY ARTERY BYPASS GRAFT  02/28/2016    CABG x4  Dr Monroy;  CHOI to LAD, SVG to obtuse marginal 1, SVG to obtuse marginal 2, and SVG to LV branch   • ENDOSCOPY N/A 6/8/2016    Procedure: ESOPHAGOGASTRODUODENOSCOPY with biopsy;  Surgeon: Alvarado Rivera MD;  Location: [facility] ENDOSCOPY;  Service:    • ENTEROSCOPY SMALL BOWEL N/A 8/2/2017    Procedure: ENTEROSCOPY SMALL BOWEL WITH COLD BIOPSIES AND ARGON PLASMA COAGULATION TO  GASTIC ULCERS;  Surgeon: Alvarado Rivera MD;  Location: [facility] ENDOSCOPY;  Service:    • HERNIA REPAIR     • JOINT REPLACEMENT      vijaya knees   • OTHER SURGICAL HISTORY      KNEE REPLACEMENT   • OTHER SURGICAL HISTORY      TREATMENT OF ANKLE FRACTURE   • OTHER SURGICAL HISTORY      TREATMENT FRACTURE OF THE HAND       Current Outpatient Medications on File Prior to Encounter   Medication Sig Dispense Refill   • aspirin 81 MG tablet Take 1 tablet by mouth daily.     • atorvastatin (LIPITOR) 40 MG tablet TAKE 1 TABLET DAILY (NEEDS CHOLESTEROL LABS BEFORE FURTHER REFILLS) 90 tablet 0   • carvedilol (COREG) 12.5 MG tablet TAKE 1 TABLET TWICE A  tablet 2   • losartan (COZAAR) 100 MG tablet TAKE 1 TABLET EVERY NIGHT 90 tablet 3   • NON FORMULARY C PAP     • pantoprazole (PROTONIX) 40 MG EC tablet TAKE 1 TABLET DAILY 90 tablet 2   • albuterol (PROVENTIL HFA;VENTOLIN HFA) 108 (90 BASE) MCG/ACT inhaler Inhale 2 puffs Every 4 (Four) Hours As Needed for wheezing. 1 inhaler 11     No current facility-administered medications on file prior to encounter.        Social History     Socioeconomic History   • Marital status:      Spouse name: Not on file   • Number of children: Not on file   • Years of education: Not on file   • Highest education level: Not on file  "  Social Needs   • Financial resource strain: Not on file   • Food insecurity - worry: Not on file   • Food insecurity - inability: Not on file   • Transportation needs - medical: Not on file   • Transportation needs - non-medical: Not on file   Occupational History   • Not on file   Tobacco Use   • Smoking status: Never Smoker   • Smokeless tobacco: Never Used   • Tobacco comment: caffeine use   Substance and Sexual Activity   • Alcohol use: Yes     Comment: OCCASIONALLY   • Drug use: No     Comment: CAFFEINE USE    • Sexual activity: Not on file   Other Topics Concern   • Not on file   Social History Narrative   • Not on file           Review of Systems   Constitution: Negative.   HENT: Negative for congestion.    Eyes: Negative for vision loss in left eye and vision loss in right eye.   Respiratory: Negative.  Negative for cough, hemoptysis, shortness of breath, sleep disturbances due to breathing, snoring, sputum production and wheezing.    Endocrine: Negative.    Hematologic/Lymphatic: Negative.    Skin: Negative for poor wound healing and rash.   Musculoskeletal: Negative for falls, gout, muscle cramps and myalgias.   Gastrointestinal: Negative for abdominal pain, diarrhea, dysphagia, hematemesis, melena, nausea and vomiting.   Neurological: Negative for excessive daytime sleepiness, dizziness, headaches, light-headedness, loss of balance, seizures and vertigo.   Psychiatric/Behavioral: Negative for depression and substance abuse. The patient is not nervous/anxious.        Procedures  Procedures        Objective:      Vitals:    01/17/19 1249 01/17/19 1250   BP: 156/77 154/93   BP Location: Left arm Right arm   Patient Position: Lying Sitting   Pulse: 65    Resp: 22    SpO2: 97%    Weight: 119 kg (262 lb)    Height: 154.9 cm (61\")      Body mass index is 49.5 kg/m².    Physical Exam   Constitutional: She is oriented to person, place, and time. She appears well-developed and well-nourished. No distress.   HENT: "   Head: Normocephalic and atraumatic.   Eyes: Conjunctivae are normal. No scleral icterus.   Neck: Neck supple. No JVD present. Carotid bruit is not present. No thyromegaly present.   Cardiovascular: Normal rate, regular rhythm, S1 normal, S2 normal, normal heart sounds and intact distal pulses.  No extrasystoles are present. PMI is not displaced. Exam reveals no gallop.   No murmur heard.  Pulses:       Carotid pulses are 2+ on the right side, and 2+ on the left side.       Radial pulses are 2+ on the right side, and 2+ on the left side.        Dorsalis pedis pulses are 2+ on the right side, and 2+ on the left side.        Posterior tibial pulses are 2+ on the right side, and 2+ on the left side.   Pulmonary/Chest: Effort normal and breath sounds normal. No respiratory distress. She has no wheezes. She has no rhonchi. She has no rales. She exhibits no tenderness.   Abdominal: Soft. Bowel sounds are normal. She exhibits no distension, no abdominal bruit and no mass. There is no tenderness.   Musculoskeletal: She exhibits no edema or deformity.   Lymphadenopathy:     She has no cervical adenopathy.   Neurological: She is alert and oriented to person, place, and time. No cranial nerve deficit.   Skin: Skin is warm and dry. No rash noted. She is not diaphoretic. No cyanosis. No pallor. Nails show no clubbing.   Psychiatric: She has a normal mood and affect. Judgment normal.   Vitals reviewed.      Lab Review:       Assessment:      Diagnosis Plan   1. Chest pain, unspecified type  Cardiac Monitoring    Vital Signs - Once    Vital Signs - As Needed    Pulse Oximetry    Insert Peripheral IV    sodium chloride 0.9 % flush 10 mL    nitroglycerin (NITROSTAT) SL tablet 0.4 mg    NPO Diet    Bathroom Privileges With Assistance    CBC & Differential    Comprehensive Metabolic Panel    Troponin T    D-Dimer    proBNP    Cardiac Monitoring    Vital Signs - Once    Insert Peripheral IV    NPO Diet    Bathroom Privileges With  Assistance    Troponin T    Troponin T    D-Dimer    D-Dimer    proBNP    proBNP    CBC Auto Differential   2. Shortness of breath  Cardiac Monitoring    Vital Signs - Once    Vital Signs - As Needed    Pulse Oximetry    Insert Peripheral IV    sodium chloride 0.9 % flush 10 mL    nitroglycerin (NITROSTAT) SL tablet 0.4 mg    NPO Diet    Bathroom Privileges With Assistance    CBC & Differential    Comprehensive Metabolic Panel    Troponin T    D-Dimer    proBNP    Cardiac Monitoring    Vital Signs - Once    Insert Peripheral IV    NPO Diet    Bathroom Privileges With Assistance    Troponin T    Troponin T    D-Dimer    D-Dimer    proBNP    proBNP    CBC Auto Differential     1. Dyspnea on exertion, elevated d-dimer, set up V/Q scan to r/o PE as allergic to iodine. Set up outpt stress nuclear and echocardiogram.   2. CAD, s/p CABG  3. Obesity  4. Hypertension  5. Hyperlipidemia.      Plan:       See rigo in 1 month - no med changes yet.

## 2019-01-31 ENCOUNTER — HOSPITAL ENCOUNTER (OUTPATIENT)
Dept: CARDIOLOGY | Facility: HOSPITAL | Age: 74
Discharge: HOME | End: 2019-01-31
Attending: INTERNAL MEDICINE | Admitting: INTERNAL MEDICINE

## 2019-01-31 ENCOUNTER — HOSPITAL ENCOUNTER (OUTPATIENT)
Dept: CARDIOLOGY | Facility: HOSPITAL | Age: 74
Discharge: HOME | End: 2019-01-31
Attending: INTERNAL MEDICINE

## 2019-01-31 ENCOUNTER — TELEPHONE (OUTPATIENT)
Dept: CARDIOLOGY | Facility: CLINIC | Age: 74
End: 2019-01-31

## 2019-01-31 VITALS — BODY MASS INDEX: 49.53 KG/M2 | WEIGHT: 262.35 LBS | HEIGHT: 61 IN

## 2019-01-31 VITALS
SYSTOLIC BLOOD PRESSURE: 140 MMHG | DIASTOLIC BLOOD PRESSURE: 62 MMHG | WEIGHT: 262 LBS | BODY MASS INDEX: 49.47 KG/M2 | HEART RATE: 79 BPM | OXYGEN SATURATION: 94 % | HEIGHT: 61 IN

## 2019-01-31 DIAGNOSIS — R06.02 SHORTNESS OF BREATH: ICD-10-CM

## 2019-01-31 DIAGNOSIS — R06.09 DYSPNEA ON EXERTION: ICD-10-CM

## 2019-01-31 DIAGNOSIS — I25.10 CORONARY ARTERY DISEASE INVOLVING NATIVE CORONARY ARTERY OF NATIVE HEART WITHOUT ANGINA PECTORIS: ICD-10-CM

## 2019-01-31 LAB
BH CV NUCLEAR PRIOR STUDY: 2
BH CV STRESS BP STAGE 1: NORMAL
BH CV STRESS COMMENTS STAGE 1: NORMAL
BH CV STRESS DOSE REGADENOSON STAGE 1: 0.4
BH CV STRESS DURATION MIN STAGE 1: 0
BH CV STRESS DURATION SEC STAGE 1: 10
BH CV STRESS HR STAGE 1: 86
BH CV STRESS PROTOCOL 1: NORMAL
BH CV STRESS RECOVERY BP: NORMAL MMHG
BH CV STRESS RECOVERY HR: 68 BPM
BH CV STRESS STAGE 1: 1
LV EF NUC BP: 56 %
MAXIMAL PREDICTED HEART RATE: 148 BPM
PERCENT MAX PREDICTED HR: 58.11 %
STRESS BASELINE BP: NORMAL MMHG
STRESS BASELINE HR: 58 BPM
STRESS PERCENT HR: 68 %
STRESS POST EXERCISE DUR SEC: 10 SEC
STRESS POST PEAK BP: NORMAL MMHG
STRESS POST PEAK HR: 86 BPM
STRESS TARGET HR: 126 BPM

## 2019-01-31 PROCEDURE — 93306 TTE W/DOPPLER COMPLETE: CPT

## 2019-01-31 PROCEDURE — 78492 MYOCRD IMG PET MLT RST&STRS: CPT | Performed by: INTERNAL MEDICINE

## 2019-01-31 PROCEDURE — 93017 CV STRESS TEST TRACING ONLY: CPT

## 2019-01-31 PROCEDURE — 25010000002 PERFLUTREN (DEFINITY) 8.476 MG IN SODIUM CHLORIDE 0.9 % 10 ML INJECTION: Performed by: INTERNAL MEDICINE

## 2019-01-31 PROCEDURE — 0 RUBIDIUM CHLORIDE: Performed by: INTERNAL MEDICINE

## 2019-01-31 PROCEDURE — 0 TECHNETIUM TETROFOSMIN KIT: Performed by: INTERNAL MEDICINE

## 2019-01-31 PROCEDURE — 93306 TTE W/DOPPLER COMPLETE: CPT | Performed by: INTERNAL MEDICINE

## 2019-01-31 PROCEDURE — A9555 RB82 RUBIDIUM: HCPCS | Performed by: INTERNAL MEDICINE

## 2019-01-31 PROCEDURE — 93016 CV STRESS TEST SUPVJ ONLY: CPT | Performed by: INTERNAL MEDICINE

## 2019-01-31 PROCEDURE — 78492 MYOCRD IMG PET MLT RST&STRS: CPT

## 2019-01-31 PROCEDURE — 25010000002 REGADENOSON 0.4 MG/5ML SOLUTION: Performed by: INTERNAL MEDICINE

## 2019-01-31 PROCEDURE — A9502 TC99M TETROFOSMIN: HCPCS | Performed by: INTERNAL MEDICINE

## 2019-01-31 PROCEDURE — 93018 CV STRESS TEST I&R ONLY: CPT | Performed by: INTERNAL MEDICINE

## 2019-01-31 RX ADMIN — RUBIDIUM CHLORIDE RB-82 1 DOSE: 150 INJECTION, SOLUTION INTRAVENOUS at 11:25

## 2019-01-31 RX ADMIN — PERFLUTREN 2 ML: 6.52 INJECTION, SUSPENSION INTRAVENOUS at 09:57

## 2019-01-31 RX ADMIN — RUBIDIUM CHLORIDE RB-82 1 DOSE: 150 INJECTION, SOLUTION INTRAVENOUS at 11:40

## 2019-01-31 RX ADMIN — TETROFOSMIN 1 DOSE: 1.38 INJECTION, POWDER, LYOPHILIZED, FOR SOLUTION INTRAVENOUS at 10:00

## 2019-01-31 RX ADMIN — REGADENOSON 0.4 MG: 0.08 INJECTION, SOLUTION INTRAVENOUS at 11:40

## 2019-01-31 NOTE — TELEPHONE ENCOUNTER
Left [facility] to call back for Echo results.  ----- Message from Anastasiia Callahan MD sent at 1/31/2019 11:55 AM EST -----  Normal

## 2019-02-01 LAB
AORTIC ARCH: 2.86 CM
AORTIC DIMENSIONLESS INDEX: 0.6 (DI)
ASCENDING AORTA: 3.1 CM
BH CV ECHO MEAS - ACS: 2.3 CM
BH CV ECHO MEAS - AO MAX PG (FULL): 2.3 MMHG
BH CV ECHO MEAS - AO MAX PG: 4.1 MMHG
BH CV ECHO MEAS - AO MEAN PG (FULL): 1.8 MMHG
BH CV ECHO MEAS - AO MEAN PG: 3 MMHG
BH CV ECHO MEAS - AO ROOT AREA (BSA CORRECTED): 1.8
BH CV ECHO MEAS - AO ROOT AREA: 11.9 CM^2
BH CV ECHO MEAS - AO ROOT DIAM: 3.9 CM
BH CV ECHO MEAS - AO V2 MAX: 101.6 CM/SEC
BH CV ECHO MEAS - AO V2 MEAN: 84.1 CM/SEC
BH CV ECHO MEAS - AO V2 VTI: 26.7 CM
BH CV ECHO MEAS - ASC AORTA: 3.1 CM
BH CV ECHO MEAS - AVA(I,A): 1.6 CM^2
BH CV ECHO MEAS - AVA(I,D): 1.6 CM^2
BH CV ECHO MEAS - AVA(V,A): 1.9 CM^2
BH CV ECHO MEAS - AVA(V,D): 1.9 CM^2
BH CV ECHO MEAS - BSA(HAYCOCK): 2.3 M^2
BH CV ECHO MEAS - BSA: 2.1 M^2
BH CV ECHO MEAS - BZI_BMI: 49.5 KILOGRAMS/M^2
BH CV ECHO MEAS - BZI_METRIC_HEIGHT: 154.9 CM
BH CV ECHO MEAS - BZI_METRIC_WEIGHT: 118.8 KG
BH CV ECHO MEAS - EDV(CUBED): 316.4 ML
BH CV ECHO MEAS - EDV(MOD-SP2): 98 ML
BH CV ECHO MEAS - EDV(MOD-SP4): 91 ML
BH CV ECHO MEAS - EDV(TEICH): 240.4 ML
BH CV ECHO MEAS - EF(CUBED): 50.5 %
BH CV ECHO MEAS - EF(MOD-BP): 58 %
BH CV ECHO MEAS - EF(MOD-SP2): 63.3 %
BH CV ECHO MEAS - EF(MOD-SP4): 58.2 %
BH CV ECHO MEAS - EF(TEICH): 41.5 %
BH CV ECHO MEAS - ESV(CUBED): 156.6 ML
BH CV ECHO MEAS - ESV(MOD-SP2): 36 ML
BH CV ECHO MEAS - ESV(MOD-SP4): 38 ML
BH CV ECHO MEAS - ESV(TEICH): 140.7 ML
BH CV ECHO MEAS - FS: 20.9 %
BH CV ECHO MEAS - IVS/LVPW: 1
BH CV ECHO MEAS - IVSD: 1.2 CM
BH CV ECHO MEAS - LAT PEAK E' VEL: 7 CM/SEC
BH CV ECHO MEAS - LV DIASTOLIC VOL/BSA (35-75): 42.9 ML/M^2
BH CV ECHO MEAS - LV MASS(C)D: 386.2 GRAMS
BH CV ECHO MEAS - LV MASS(C)DI: 182.3 GRAMS/M^2
BH CV ECHO MEAS - LV MAX PG: 1.8 MMHG
BH CV ECHO MEAS - LV MEAN PG: 1.2 MMHG
BH CV ECHO MEAS - LV SYSTOLIC VOL/BSA (12-30): 17.9 ML/M^2
BH CV ECHO MEAS - LV V1 MAX: 67.4 CM/SEC
BH CV ECHO MEAS - LV V1 MEAN: 53.6 CM/SEC
BH CV ECHO MEAS - LV V1 VTI: 15.1 CM
BH CV ECHO MEAS - LVIDD: 6.8 CM
BH CV ECHO MEAS - LVIDS: 5.4 CM
BH CV ECHO MEAS - LVLD AP2: 7.4 CM
BH CV ECHO MEAS - LVLD AP4: 7.1 CM
BH CV ECHO MEAS - LVLS AP2: 5.6 CM
BH CV ECHO MEAS - LVLS AP4: 6.6 CM
BH CV ECHO MEAS - LVOT AREA (M): 2.8 CM^2
BH CV ECHO MEAS - LVOT AREA: 2.9 CM^2
BH CV ECHO MEAS - LVOT DIAM: 1.9 CM
BH CV ECHO MEAS - LVPWD: 1.2 CM
BH CV ECHO MEAS - MED PEAK E' VEL: 4 CM/SEC
BH CV ECHO MEAS - MV A DUR: 0.11 SEC
BH CV ECHO MEAS - MV A MAX VEL: 50.4 CM/SEC
BH CV ECHO MEAS - MV DEC SLOPE: 127.1 CM/SEC^2
BH CV ECHO MEAS - MV DEC TIME: 0.32 SEC
BH CV ECHO MEAS - MV E MAX VEL: 42.7 CM/SEC
BH CV ECHO MEAS - MV E/A: 0.85
BH CV ECHO MEAS - MV MAX PG: 1.8 MMHG
BH CV ECHO MEAS - MV MEAN PG: 0.63 MMHG
BH CV ECHO MEAS - MV P1/2T MAX VEL: 41.8 CM/SEC
BH CV ECHO MEAS - MV P1/2T: 96.4 MSEC
BH CV ECHO MEAS - MV V2 MAX: 66.2 CM/SEC
BH CV ECHO MEAS - MV V2 MEAN: 37.6 CM/SEC
BH CV ECHO MEAS - MV V2 VTI: 18.9 CM
BH CV ECHO MEAS - MVA P1/2T LCG: 5.3 CM^2
BH CV ECHO MEAS - MVA(P1/2T): 2.3 CM^2
BH CV ECHO MEAS - MVA(VTI): 2.3 CM^2
BH CV ECHO MEAS - PA ACC TIME: 0.09 SEC
BH CV ECHO MEAS - PA MAX PG (FULL): 0.66 MMHG
BH CV ECHO MEAS - PA MAX PG: 2.1 MMHG
BH CV ECHO MEAS - PA PR(ACCEL): 37.8 MMHG
BH CV ECHO MEAS - PA V2 MAX: 72.9 CM/SEC
BH CV ECHO MEAS - PULM A REVS DUR: 0.11 SEC
BH CV ECHO MEAS - PULM A REVS VEL: 28.6 CM/SEC
BH CV ECHO MEAS - PULM DIAS VEL: 36.2 CM/SEC
BH CV ECHO MEAS - PULM S/D: 0.95
BH CV ECHO MEAS - PULM SYS VEL: 34.3 CM/SEC
BH CV ECHO MEAS - PVA(V,A): 2.7 CM^2
BH CV ECHO MEAS - PVA(V,D): 2.7 CM^2
BH CV ECHO MEAS - QP/QS: 1
BH CV ECHO MEAS - RAP SYSTOLE: 3 MMHG
BH CV ECHO MEAS - RV MAX PG: 1.5 MMHG
BH CV ECHO MEAS - RV MEAN PG: 0.81 MMHG
BH CV ECHO MEAS - RV V1 MAX: 60.6 CM/SEC
BH CV ECHO MEAS - RV V1 MEAN: 42.1 CM/SEC
BH CV ECHO MEAS - RV V1 VTI: 14.1 CM
BH CV ECHO MEAS - RVOT AREA: 3.2 CM^2
BH CV ECHO MEAS - RVOT DIAM: 2 CM
BH CV ECHO MEAS - RVSP: 37 MMHG
BH CV ECHO MEAS - SI(AO): 150.3 ML/M^2
BH CV ECHO MEAS - SI(CUBED): 75.4 ML/M^2
BH CV ECHO MEAS - SI(LVOT): 20.6 ML/M^2
BH CV ECHO MEAS - SI(MOD-SP2): 29.3 ML/M^2
BH CV ECHO MEAS - SI(MOD-SP4): 25 ML/M^2
BH CV ECHO MEAS - SI(TEICH): 47 ML/M^2
BH CV ECHO MEAS - SUP REN AO DIAM: 2.05 CM
BH CV ECHO MEAS - SV(AO): 318.5 ML
BH CV ECHO MEAS - SV(CUBED): 159.8 ML
BH CV ECHO MEAS - SV(LVOT): 43.7 ML
BH CV ECHO MEAS - SV(MOD-SP2): 62 ML
BH CV ECHO MEAS - SV(MOD-SP4): 53 ML
BH CV ECHO MEAS - SV(RVOT): 44.9 ML
BH CV ECHO MEAS - SV(TEICH): 99.6 ML
BH CV ECHO MEAS - TAPSE (>1.6): 2 CM2
BH CV ECHO MEAS - TR MAX VEL: 289.9 CM/SEC
BH CV ECHO MEASUREMENTS AVERAGE E/E' RATIO: 7.76
BH CV VAS BP RIGHT ARM: NORMAL MMHG
BH CV XLRA - RV BASE: 2.91 CM
BH CV XLRA - TDI S': 8 CM/SEC
LEFT ATRIUM VOLUME INDEX: 19 ML/M2
LV EF 2D ECHO EST: 58 %
MAXIMAL PREDICTED HEART RATE: 148 BPM
SINUS: 2.75 CM
STJ: 2.98 CM
STRESS TARGET HR: 126 BPM

## 2019-02-05 DIAGNOSIS — R06.09 DYSPNEA ON EXERTION: ICD-10-CM

## 2019-02-05 DIAGNOSIS — R94.39 ABNORMAL NUCLEAR STRESS TEST: Primary | ICD-10-CM

## 2019-02-11 ENCOUNTER — HOSPITAL ENCOUNTER (OUTPATIENT)
Facility: HOSPITAL | Age: 74
Setting detail: HOSPITAL OUTPATIENT SURGERY
Discharge: HOME | End: 2019-02-11
Attending: INTERNAL MEDICINE | Admitting: INTERNAL MEDICINE

## 2019-02-11 VITALS
WEIGHT: 260 LBS | TEMPERATURE: 98.5 F | HEART RATE: 62 BPM | BODY MASS INDEX: 49.09 KG/M2 | HEIGHT: 61 IN | RESPIRATION RATE: 16 BRPM | OXYGEN SATURATION: 96 % | DIASTOLIC BLOOD PRESSURE: 62 MMHG | SYSTOLIC BLOOD PRESSURE: 149 MMHG

## 2019-02-11 DIAGNOSIS — R94.39 ABNORMAL NUCLEAR STRESS TEST: ICD-10-CM

## 2019-02-11 DIAGNOSIS — R06.09 DYSPNEA ON EXERTION: ICD-10-CM

## 2019-02-11 PROCEDURE — 93459 L HRT ART/GRFT ANGIO: CPT | Performed by: INTERNAL MEDICINE

## 2019-02-11 PROCEDURE — C1769 GUIDE WIRE: HCPCS | Performed by: INTERNAL MEDICINE

## 2019-02-11 PROCEDURE — 25010000002 HEPARIN (PORCINE) PER 1000 UNITS: Performed by: INTERNAL MEDICINE

## 2019-02-11 PROCEDURE — 99152 MOD SED SAME PHYS/QHP 5/>YRS: CPT | Performed by: INTERNAL MEDICINE

## 2019-02-11 PROCEDURE — 99153 MOD SED SAME PHYS/QHP EA: CPT | Performed by: INTERNAL MEDICINE

## 2019-02-11 PROCEDURE — 25010000002 FENTANYL CITRATE (PF) 100 MCG/2ML SOLUTION: Performed by: INTERNAL MEDICINE

## 2019-02-11 PROCEDURE — 0 IOPAMIDOL PER 1 ML: Performed by: INTERNAL MEDICINE

## 2019-02-11 PROCEDURE — C1894 INTRO/SHEATH, NON-LASER: HCPCS | Performed by: INTERNAL MEDICINE

## 2019-02-11 PROCEDURE — 25010000002 MIDAZOLAM PER 1 MG: Performed by: INTERNAL MEDICINE

## 2019-02-11 RX ORDER — FENTANYL CITRATE 50 UG/ML
INJECTION, SOLUTION INTRAMUSCULAR; INTRAVENOUS AS NEEDED
Status: DISCONTINUED | OUTPATIENT
Start: 2019-02-11 | End: 2019-02-11 | Stop reason: HOSPADM

## 2019-02-11 RX ORDER — SODIUM CHLORIDE 0.9 % (FLUSH) 0.9 %
3 SYRINGE (ML) INJECTION EVERY 12 HOURS SCHEDULED
Status: CANCELLED | OUTPATIENT
Start: 2019-02-11

## 2019-02-11 RX ORDER — LIDOCAINE HYDROCHLORIDE 20 MG/ML
INJECTION, SOLUTION INFILTRATION; PERINEURAL AS NEEDED
Status: DISCONTINUED | OUTPATIENT
Start: 2019-02-11 | End: 2019-02-11 | Stop reason: HOSPADM

## 2019-02-11 RX ORDER — LIDOCAINE HYDROCHLORIDE 10 MG/ML
0.1 INJECTION, SOLUTION EPIDURAL; INFILTRATION; INTRACAUDAL; PERINEURAL ONCE AS NEEDED
Status: DISCONTINUED | OUTPATIENT
Start: 2019-02-11 | End: 2019-02-11 | Stop reason: HOSPADM

## 2019-02-11 RX ORDER — SODIUM CHLORIDE 0.9 % (FLUSH) 0.9 %
3 SYRINGE (ML) INJECTION EVERY 12 HOURS SCHEDULED
Status: DISCONTINUED | OUTPATIENT
Start: 2019-02-11 | End: 2019-02-11 | Stop reason: HOSPADM

## 2019-02-11 RX ORDER — SODIUM CHLORIDE 0.9 % (FLUSH) 0.9 %
3-10 SYRINGE (ML) INJECTION AS NEEDED
Status: DISCONTINUED | OUTPATIENT
Start: 2019-02-11 | End: 2019-02-11 | Stop reason: HOSPADM

## 2019-02-11 RX ORDER — SODIUM CHLORIDE 0.9 % (FLUSH) 0.9 %
3-10 SYRINGE (ML) INJECTION AS NEEDED
Status: CANCELLED | OUTPATIENT
Start: 2019-02-11

## 2019-02-11 RX ORDER — SODIUM CHLORIDE 9 MG/ML
75 INJECTION, SOLUTION INTRAVENOUS CONTINUOUS
Status: DISCONTINUED | OUTPATIENT
Start: 2019-02-11 | End: 2019-02-11 | Stop reason: HOSPADM

## 2019-02-11 RX ORDER — SODIUM CHLORIDE 9 MG/ML
100 INJECTION, SOLUTION INTRAVENOUS CONTINUOUS
Status: DISCONTINUED | OUTPATIENT
Start: 2019-02-11 | End: 2019-02-11 | Stop reason: HOSPADM

## 2019-02-11 RX ORDER — MIDAZOLAM HYDROCHLORIDE 1 MG/ML
INJECTION INTRAMUSCULAR; INTRAVENOUS AS NEEDED
Status: DISCONTINUED | OUTPATIENT
Start: 2019-02-11 | End: 2019-02-11 | Stop reason: HOSPADM

## 2019-02-11 RX ADMIN — SODIUM CHLORIDE 75 ML/HR: 9 INJECTION, SOLUTION INTRAVENOUS at 10:56

## 2019-02-11 NOTE — DISCHARGE INSTRUCTIONS
[facility]  [address] [place] [address]    Coronary Angiogram (Radial/Ulnar Approach) After Care    Refer to this sheet in the next few weeks. These instructions provide you with information on caring for yourself after your procedure. Your caregiver may also give you more specific instructions. Your treatment has been planned according to current medical practices, but problems sometimes occur. Call your caregiver if you have any problems or questions after your procedure.    Home Care Instructions:  · You may shower the day after the procedure. Remove the bandage (dressing) and gently wash the site with plain soap and water. Gently pat the site dry. You may apply a band aid daily for 2 days if desired.    · Do not apply powder or lotion to the site.  · Do not submerge the affected site in water for 3 to 5 days or until the site is completely healed.   · Do not lift, push or pull anything over 10 pounds for 2 days after your procedure.  · Inspect the site at least twice daily. You may notice some bruising at the site and it may be tender for 1 to 2 weeks.     · Increase your fluid intake for the next 2 days.    · Keep arm elevated for 24 hours. For the remainder of the day, keep your arm in “Pledge of Allegiance” position when up and about.     · You may drive 24 hours after the procedure unless otherwise instructed by your caregiver.  · Do not operate machinery or power tools for 24 hours.  · A responsible adult should be with you for the first 24 hours after you arrive home. Do not make any important legal decisions or sign legal papers for 24 hours.  Do not drink alcohol for 24 hours.    · Metformin or any medications containing Metformin should not be taken for 48 hours after your procedure.      Call Your Doctor if:   · You have unusual pain at the radial/ulnar (wrist) site.  · You have redness, warmth, swelling, or pain at the radial/ulnar (wrist) site.  · You have drainage (other  than a small amount of blood on the dressing).  · You have chills or a fever > 101.  · Your arm becomes pale or dark, cool, tingly, or numb.  · You have heavy bleeding from the site, hold pressure on the site for 20 minutes.  If the bleeding stops, apply a fresh bandage and call your cardiologist.  However, if you continue to have bleeding, call 911.

## 2019-02-11 NOTE — INTERVAL H&P NOTE
H&P updated. The patient was examined and the following changes are noted:  stress test with anterior and inferior ischemia.  for cardiac catheterization today.

## 2019-02-20 ENCOUNTER — OFFICE VISIT (OUTPATIENT)
Dept: CARDIOLOGY | Facility: CLINIC | Age: 74
End: 2019-02-20

## 2019-02-20 VITALS
BODY MASS INDEX: 49.61 KG/M2 | WEIGHT: 262.8 LBS | SYSTOLIC BLOOD PRESSURE: 144 MMHG | HEIGHT: 61 IN | HEART RATE: 74 BPM | DIASTOLIC BLOOD PRESSURE: 70 MMHG

## 2019-02-20 DIAGNOSIS — R06.09 DYSPNEA ON EXERTION: ICD-10-CM

## 2019-02-20 DIAGNOSIS — I42.9 CARDIOMYOPATHY, UNSPECIFIED TYPE (HCC): ICD-10-CM

## 2019-02-20 DIAGNOSIS — I25.10 CORONARY ARTERY DISEASE INVOLVING NATIVE CORONARY ARTERY OF NATIVE HEART WITHOUT ANGINA PECTORIS: Primary | ICD-10-CM

## 2019-02-20 DIAGNOSIS — G47.33 OBSTRUCTIVE APNEA: ICD-10-CM

## 2019-02-20 DIAGNOSIS — I10 ESSENTIAL HYPERTENSION: ICD-10-CM

## 2019-02-20 DIAGNOSIS — I49.3 PVC (PREMATURE VENTRICULAR CONTRACTION): ICD-10-CM

## 2019-02-20 DIAGNOSIS — E66.01 CLASS 3 SEVERE OBESITY DUE TO EXCESS CALORIES WITH SERIOUS COMORBIDITY AND BODY MASS INDEX (BMI) OF 45.0 TO 49.9 IN ADULT (HCC): ICD-10-CM

## 2019-02-20 PROCEDURE — 99214 OFFICE O/P EST MOD 30 MIN: CPT | Performed by: NURSE PRACTITIONER

## 2019-02-20 PROCEDURE — 93000 ELECTROCARDIOGRAM COMPLETE: CPT | Performed by: NURSE PRACTITIONER

## 2019-02-20 RX ORDER — CARVEDILOL 25 MG/1
25 TABLET ORAL 2 TIMES DAILY
Qty: 180 TABLET | Refills: 1 | Status: SHIPPED | OUTPATIENT
Start: 2019-02-20 | End: 2019-07-09 | Stop reason: SDUPTHER

## 2019-02-20 NOTE — PROGRESS NOTES
Date of Office Visit: 2019  Encounter Provider: MANDIE Silverio  Place of Service: [facility] CARDIOLOGY  Patient Name: Julee King  :1945    Chief Complaint   Patient presents with   • Coronary Artery Disease   :     HPI: Julee King is a 73 y.o. female who presents today for cardiac follow up. She has a history of hiatal hernia, hypertension, hyperlipidemia, carotid artery disease, obstructive sleep apnea, and obesity.  In 2016, she was severely anemic from a GI bleed, EF was low at 33%, and she underwent CABG.  In 2017 she had a repeat echocardiogram which revealed improved EF of 54%.    She was recently evaluated by Dr. Anastasiia Callahan in 2019 and reported worsening dyspnea upon exertion for 6 weeks.  Her d-dimer was mildly elevated.  She also noted some occasional mild chest heaviness.  Her d-dimer was elevated and she had a VQ scan completed (unable to do CTA of chest because of allergy to iodine) which was negative for pulmonary emboli.  She had a 2D echocardiogram completed 19 which revealed an EF of 58%, mild LVH, and trace valvular regurgitation.  Cardiac PET scan 19 was abnormal revealing a medium size, mildly severe area of ischemia in the anterior and inferior lateral wall.    Dr. Callahan recommended that she undergo cardiac catheterization which was completed 19 with the following results: Multivessel coronary artery disease with 2/4 patent grafts.  Dr. Carmen recommended medical management and if this fails to consider PCI of the distal left main to help with perfusion of the distal left circumflex system.    She presents today for follow-up.  She denies any further chest heaviness, but continues to experience dyspnea with exertion that resolves with rest.  She states that this is been chronic for over a year.  Today she has a cough and a cold-lung sounds are normal.  She has bilateral chronic lower  extremity edema.  She denies chest pain, PND, orthopnea, cough, palpitations, dizziness, or syncope.  Her blood pressure is elevated today.    Past Medical History:   Diagnosis Date   • Bronchitis 05/2016   • Carotid artery disease (CMS/[facility])     16-49% stenosis of the left internal carotid 2016; carotid duplex 1/2017 - normal   • Colon polyps    • Coronary artery disease 01/2016    Non-STEMI and status post CABG   • Dyslipidemia    • GI bleed     Severely anemic and received a blood transfusion   • Hiatal hernia    • Hyperlipidemia    • Hypertension    • Hypocalcemia    • Hypokalemia    • Non-STEMI (non-ST elevated myocardial infarction) (CMS/[facility]) 01/2016   • Obesity    • LOVE (obstructive sleep apnea)     moderate to severe; compliant with CPAP machine       Past Surgical History:   Procedure Laterality Date   • CARDIAC CATHETERIZATION N/A 2/11/2019    Procedure: Coronary angiography;  Surgeon: Cheryl Almeida MD;  Location:  [facility] CATH INVASIVE LOCATION;  Service: Cardiovascular   • CARDIAC CATHETERIZATION N/A 2/11/2019    Procedure: Left Heart Cath;  Surgeon: Cheryl Almeida MD;  [facility];  Service: Cardiovascular   • CARDIAC CATHETERIZATION N/A 2/11/2019    Procedure: Left ventriculography;  Surgeon: Cheryl Almeida MD;  Location:  [facility] CATH INVASIVE LOCATION;  Service: Cardiovascular   • CARDIAC CATHETERIZATION  2/11/2019    Procedure: Saphenous Vein Graft;  Surgeon: Cheryl Almeida MD;  Location:  [facility] CATH INVASIVE LOCATION;  Service: Cardiovascular   • CARDIAC CATHETERIZATION N/A 2/11/2019    Procedure: Native mammary injection;  Surgeon: Cheryl Almeida MD;  Location:  [facility] CATH INVASIVE LOCATION;  Service: Cardiovascular   • CHOLECYSTECTOMY     • COLONOSCOPY N/A 6/8/2016    Procedure: COLONOSCOPY to cecum with cold biopsy polypectomies;  Surgeon: Alvarado Rivera MD;  [facility];  Service:    • CORONARY ARTERY BYPASS GRAFT  02/28/2016    CABG x4    [place];  LIMA to LAD, SVG to obtuse marginal 1, SVG to obtuse marginal 2, and SVG to LV branch   • ENDOSCOPY N/A 6/8/2016    Procedure: ESOPHAGOGASTRODUODENOSCOPY with biopsy;  Surgeon: Alvarado Rivera MD;  Location: [facility] ENDOSCOPY;  Service:    • ENTEROSCOPY SMALL BOWEL N/A 8/2/2017    Procedure: ENTEROSCOPY SMALL BOWEL WITH COLD BIOPSIES AND ARGON PLASMA COAGULATION TO  GASTIC ULCERS;  Surgeon: Alvarado Rivera MD;  Location: [facility] ENDOSCOPY;  Service:    • HERNIA REPAIR     • JOINT REPLACEMENT      vijaya knees   • OTHER SURGICAL HISTORY      KNEE REPLACEMENT   • OTHER SURGICAL HISTORY      TREATMENT OF ANKLE FRACTURE   • OTHER SURGICAL HISTORY      TREATMENT FRACTURE OF THE HAND       Social History     Socioeconomic History   • Marital status:      Spouse name: Not on file   • Number of children: Not on file   • Years of education: Not on file   • Highest education level: Not on file   Social Needs   • Financial resource strain: Not on file   • Food insecurity - worry: Not on file   • Food insecurity - inability: Not on file   • Transportation needs - medical: Not on file   • Transportation needs - non-medical: Not on file   Occupational History   • Not on file   Tobacco Use   • Smoking status: Never Smoker   • Smokeless tobacco: Never Used   • Tobacco comment: caffeine use   Substance and Sexual Activity   • Alcohol use: Yes     Comment: OCCASIONALLY   • Drug use: No     Comment: CAFFEINE USE    • Sexual activity: Defer   Other Topics Concern   • Not on file   Social History Narrative   • Not on file       Family History   Problem Relation Age of Onset   • Coronary artery disease Mother    • Other Father    • Coronary artery disease Father    • Stroke Sister    • Heart attack Sister        Review of Systems   Constitution: Positive for malaise/fatigue. Negative for chills, diaphoresis, fever, night sweats, weight gain and weight loss.   HENT: Negative for hearing loss, nosebleeds, sore throat and tinnitus.  "   Eyes: Negative for blurred vision, double vision, pain and visual disturbance.   Cardiovascular: Positive for dyspnea on exertion. Negative for chest pain, claudication, cyanosis, irregular heartbeat, leg swelling, near-syncope, orthopnea, palpitations, paroxysmal nocturnal dyspnea and syncope.   Respiratory: Positive for cough, shortness of breath and wheezing. Negative for hemoptysis and snoring.    Endocrine: Negative for cold intolerance, heat intolerance and polyuria.   Hematologic/Lymphatic: Negative for bleeding problem. Does not bruise/bleed easily.   Skin: Negative for color change, dry skin, flushing and itching.   Musculoskeletal: Negative for falls, joint pain, joint swelling, muscle cramps, muscle weakness and myalgias.   Gastrointestinal: Negative for abdominal pain, constipation, heartburn, melena, nausea and vomiting.   Genitourinary: Negative for dysuria and hematuria.   Neurological: Negative for excessive daytime sleepiness, dizziness, light-headedness, loss of balance, numbness, paresthesias, seizures and vertigo.   Psychiatric/Behavioral: Negative for altered mental status, depression, memory loss and substance abuse. The patient does not have insomnia and is not nervous/anxious.    Allergic/Immunologic: Negative for environmental allergies.       Allergies   Allergen Reactions   • Codeine      \"makes me loopy\"   • Iodinated Diagnostic Agents      itching   • Sulfa Antibiotics Unknown (See Comments)     NOT SURE         Current Outpatient Medications:   •  aspirin 81 MG tablet, Take 1 tablet by mouth daily., Disp: , Rfl:   •  atorvastatin (LIPITOR) 40 MG tablet, TAKE 1 TABLET DAILY (NEEDS CHOLESTEROL LABS BEFORE FURTHER REFILLS), Disp: 90 tablet, Rfl: 0  •  carvedilol (COREG) 12.5 MG tablet, TAKE 1 TABLET TWICE A DAY, Disp: 180 tablet, Rfl: 2  •  losartan (COZAAR) 100 MG tablet, TAKE 1 TABLET EVERY NIGHT, Disp: 90 tablet, Rfl: 3  •  NON FORMULARY, C PAP, Disp: , Rfl:   •  pantoprazole " "(PROTONIX) 40 MG EC tablet, TAKE 1 TABLET DAILY, Disp: 90 tablet, Rfl: 2  •  albuterol (PROVENTIL HFA;VENTOLIN HFA) 108 (90 BASE) MCG/ACT inhaler, Inhale 2 puffs Every 4 (Four) Hours As Needed for wheezing., Disp: 1 inhaler, Rfl: 11  No current facility-administered medications for this visit.       Objective:     Vitals:    02/20/19 1302   BP: 144/70   BP Location: Left arm   Pulse: 74   Weight: 119 kg (262 lb 12.8 oz)   Height: 154.9 cm (61\")     Body mass index is 49.66 kg/m².    PHYSICAL EXAM:    Vitals Reviewed.   General Appearance: No acute distress, well developed and well nourished. Obese.   Eyes: Conjunctiva and lids: No erythema, swelling, or discharge. Sclera non-icteric.   HENT: Atraumatic, normocephalic. External eyes, ears, and nose normal. No hearing loss noted. Mucous membranes normal. Lips not cyanotic. Neck supple with no tenderness.  Respiratory: No signs of respiratory distress. Respiration rhythm and depth normal.   Clear to auscultation. No rales, crackles, rhonchi, or wheezing auscultated.   Cardiovascular:  Jugular Venous Pressure: Normal  Heart Rate and Rhythm: Normal, Heart Sounds: Normal S1 and S2. No S3 or S4 noted.  Murmurs: No murmurs noted. No rubs, thrills, or gallops.   Arterial Pulses: Carotid pulses normal. No carotid bruit noted. Posterior tibialis and dorsalis pedis pulses normal.   Lower Extremities: Bilateral trace lower extremity edema noted.  Gastrointestinal:  Abdomen soft, non-distended, non-tender. Normal bowel sounds. No hepatomegaly.   Musculoskeletal: Normal movement of extremities  Skin and Nails: General appearance normal. No pallor, cyanosis, diaphoresis. Skin temperature normal. No clubbing of fingernails.   Psychiatric: Patient alert and oriented to person, place, and time. Speech and behavior appropriate. Normal mood and affect.       ECG 12 Lead  Date/Time: 2/20/2019 1:05 PM  Performed by: Anabel Wood APRN  Authorized by: Anabel Wood APRN "   Comparison: compared with previous ECG from 1/17/2019  Comparison to previous ECG: Normal sinus rhythm, heart rate 60, T wave inversions V2-V4  Rhythm: sinus rhythm  Ectopy: unifocal PVCs  Rate: normal  BPM: 74  Conduction: left anterior fascicular block  ST Segments: ST segments normal  T inversion: V2, V3, V4 and V5  T flattening: V6  QRS axis: normal    Clinical impression: abnormal EKG              Assessment:       Diagnosis Plan   1. Coronary artery disease involving native coronary artery of native heart without angina pectoris     2. Cardiomyopathy, unspecified type (CMS/HCC)     3. Dyspnea on exertion     4. PVC (premature ventricular contraction)     5. Essential hypertension     6. Class 3 severe obesity due to excess calories with serious comorbidity and body mass index (BMI) of 45.0 to 49.9 in adult (CMS/HCC)     7. Obstructive apnea            Plan:       1.  Coronary Artery Disease: Status post CABG January 2016 and recent cardiac catheterization showed 2 out of 4 patent bypass grafts.  Medical treatment recommended.  I recommended increasing her carvedilol to 25 mg twice a day.  She denies further chest heaviness, but continues to experience dyspnea which I feel may be multifactorial.  I have recommended that she monitor her blood pressure and heart rate and call with any concerns.    Coronary Artery Disease  Assessment  • The patient has no angina    Plan  • Lifestyle modifications discussed include adhering to a heart healthy diet, avoidance of tobacco products, maintenance of a healthy weight, medication compliance, regular exercise and regular monitoring of cholesterol and blood pressure    Subjective - Objective  • There is a history of previous coronary artery bypass graft  • Current antiplatelet therapy includes aspirin 81 mg      2.  Cardiomyopathy: EF normalized to 54% per echocardiogram 2017.  3.  PVCs: Noted on today's EKG and she is asymptomatic.  4.  Hypertension: Blood pressure is  elevated today.  She would benefit from heart healthy, low-sodium diet, weight loss, and exercise.  5.  Obstructive Sleep Apnea: Compliant with CPAP machine.  6.  Obesity: BMI is 49.7.    7.  Follow up with Dr. Callahan in 3 months, unless otherwise needed sooner.  If she has any worsening symptoms, I have asked her to contact the office.    As always, it has been a pleasure to participate in your patient's care.      Sincerely,         MANDIE Rodriguez

## 2019-03-12 ENCOUNTER — PATIENT MESSAGE (OUTPATIENT)
Dept: CARDIOLOGY | Facility: CLINIC | Age: 74
End: 2019-03-12

## 2019-03-12 NOTE — TELEPHONE ENCOUNTER
From: Julee King  To: Anastasiia Callahan MD  Sent: 3/12/2019 11:05 AM EDT  Subject: Prescription Question    A RECALL LETTER ON LOSARTAN POTASSIUM 100 MG TAB  WHAT DO I DO ABOUT THE RECALL   IT IS ON CERTAIN LOTS OF LOSARTAN PRODUCTS

## 2019-04-03 RX ORDER — ATORVASTATIN CALCIUM 40 MG/1
40 TABLET, FILM COATED ORAL DAILY
Qty: 90 TABLET | Refills: 1 | Status: SHIPPED | OUTPATIENT
Start: 2019-04-03 | End: 2019-09-30 | Stop reason: SDUPTHER

## 2019-05-22 ENCOUNTER — OFFICE VISIT (OUTPATIENT)
Dept: FAMILY MEDICINE CLINIC | Facility: CLINIC | Age: 74
End: 2019-05-22

## 2019-05-22 VITALS
OXYGEN SATURATION: 94 % | TEMPERATURE: 98 F | WEIGHT: 260 LBS | DIASTOLIC BLOOD PRESSURE: 70 MMHG | SYSTOLIC BLOOD PRESSURE: 124 MMHG | HEART RATE: 58 BPM | BODY MASS INDEX: 49.09 KG/M2 | HEIGHT: 61 IN | RESPIRATION RATE: 20 BRPM

## 2019-05-22 DIAGNOSIS — T14.8XXA WOUND INFECTION: Primary | ICD-10-CM

## 2019-05-22 DIAGNOSIS — L08.9 WOUND INFECTION: Primary | ICD-10-CM

## 2019-05-22 PROCEDURE — 90715 TDAP VACCINE 7 YRS/> IM: CPT | Performed by: FAMILY MEDICINE

## 2019-05-22 PROCEDURE — 90471 IMMUNIZATION ADMIN: CPT | Performed by: FAMILY MEDICINE

## 2019-05-22 PROCEDURE — 99213 OFFICE O/P EST LOW 20 MIN: CPT | Performed by: FAMILY MEDICINE

## 2019-05-22 RX ORDER — DOXYCYCLINE HYCLATE 100 MG/1
100 CAPSULE ORAL 2 TIMES DAILY
Qty: 20 CAPSULE | Refills: 0 | Status: SHIPPED | OUTPATIENT
Start: 2019-05-22 | End: 2019-06-11

## 2019-05-22 NOTE — PROGRESS NOTES
SUBJECTIVE:  The patient is a 74-year-old white female who comes in for right lower leg injury.  She bumped it on some logs.  This happened 3 weeks ago.  There is an area that is not healing well and is becoming painful.    PAST MEDICAL HISTORY:  Reviewed.    REVIEW OF SYSTEMS:  Please see above; 14 point ROS negative.      OBJECTIVE:   Vitals signs are reviewed and are stable.    General:  Well-nourished.  Alert and oriented x3 in no acute distress.  HEENT: PERRLA.   Neck:  Supple.   Lungs:  Clear.    Heart:  Regular rate and rhythm.   Abdomen:   Soft, nontender.   Extremities:  No cyanosis, clubbing or edema.  2 cm abrasion with surrounding erythema noted on the anterior mid tib-fib region.  Neurovascular status is intact.  Neurological:  Grossly intact without motor or sensory deficits.     ASSESSMENT:    Wound infection.  PLAN: Wound care discussed.  Doxycycline 100 twice daily.  Follow-up in 2 to 3 days if not improving.  Notify sooner if worse or problems.  DT given.

## 2019-06-11 ENCOUNTER — OFFICE VISIT (OUTPATIENT)
Dept: CARDIOLOGY | Facility: CLINIC | Age: 74
End: 2019-06-11

## 2019-06-11 VITALS
SYSTOLIC BLOOD PRESSURE: 118 MMHG | DIASTOLIC BLOOD PRESSURE: 70 MMHG | HEIGHT: 61 IN | BODY MASS INDEX: 49.58 KG/M2 | WEIGHT: 262.6 LBS | HEART RATE: 65 BPM

## 2019-06-11 DIAGNOSIS — Z95.1 S/P CABG (CORONARY ARTERY BYPASS GRAFT): ICD-10-CM

## 2019-06-11 DIAGNOSIS — I10 ESSENTIAL HYPERTENSION: ICD-10-CM

## 2019-06-11 DIAGNOSIS — G47.33 OBSTRUCTIVE APNEA: ICD-10-CM

## 2019-06-11 DIAGNOSIS — I25.10 CORONARY ARTERY DISEASE INVOLVING NATIVE CORONARY ARTERY OF NATIVE HEART WITHOUT ANGINA PECTORIS: Primary | ICD-10-CM

## 2019-06-11 DIAGNOSIS — I42.9 CARDIOMYOPATHY, UNSPECIFIED TYPE (HCC): ICD-10-CM

## 2019-06-11 PROBLEM — R06.09 DYSPNEA ON EXERTION: Status: RESOLVED | Noted: 2019-02-05 | Resolved: 2019-06-11

## 2019-06-11 PROCEDURE — 93000 ELECTROCARDIOGRAM COMPLETE: CPT | Performed by: INTERNAL MEDICINE

## 2019-06-11 PROCEDURE — 99214 OFFICE O/P EST MOD 30 MIN: CPT | Performed by: INTERNAL MEDICINE

## 2019-06-11 NOTE — PROGRESS NOTES
Date of Office Visit: 2019  Encounter Provider: Anastasiia Callahan MD  Place of Service: [facility] CARDIOLOGY  Patient Name: Julee King  :1945      Patient ID:  Julee King is a 74 y.o. female is here for  followup for CAD, s/p CABG.         History of Present Illness    She is followed for CAD, s/p CABG.      She came in with midsternal chest pain 2014 to [facility]. She had known history  of hypertension, hyperlipidemia, and had had a history of mild obstructive sleep apnea,  but was not using a CPAP machine. She was exercising at the [facility] [place] 3 times a week and when  she would get on the treadmill she would notice this chest tightness. When we saw her in  the chest pain unit her blood pressure was a bit elevated. We ordered a couple of studies.  She had a 2-D echocardiogram with Doppler done 2014 showing ejection fraction of 65%  with grade I diastolic dysfunction and mild left ventricular hypertrophy. She also had a  PET stress study, which was done 2014, which showed no evidence of ischemia. We then  made a referral to the [facility] at [facility]. She was found  to have obstructive sleep apnea, which was moderate to severe, with recommendation of a  CPAP, which she is currently using. She says she is having some difficulty tolerating it,  but she is going to try to stick with it.       She then presented to the hospital 2016 with chest pain and pressure. She was severely anemic and found to have gastrointestinal bleeding. She did receive an transfusions. Her troponin was elevated at 4. Her echocardiogram showed left ventricular ejection fraction of 33% with mild to moderate mitral insufficiency. She went on to have a cardiac catheterization done which showed 80% distal left main stenosis, 60% distal first obtuse marginal stenosis, 30% ramus mid vessel stenosis, 30% proximal LAD stenosis, 100% occluded first diagonal  branch, 70% mid LAD stenosis, 100% proximal RCA stenosis and 95% distal RCA stenosis. She went on to have coronary bypass grafting 1/22/2016. She received LIMA to LAD, SVG to obtuse marginal 1, SVG to obtuse marginal 2 and SVG to LV branch. She also had endoscopy Dr. Senior done 1/24/2016 showing small hiatal hernia, normal stomach and duodenal.       Her carotid study done 1/25/2016 shows 16-49% stenosis of left internal carotid artery. The right internal carotid artery is patent.  She had a normal carotid duplex done January 2017.       She sees Dr. Galaviz for LOVE. She has a colonoscopy 6/2016 with Dr. Rivera and it was good.     Labs done 1/2019 show HDL 40, LDL 75, normal CMP and CBC.    She was recently evaluated in January 2019 and reported worsening dyspnea upon exertion for 6 weeks.  Her d-dimer was mildly elevated.  She also noted some occasional mild chest heaviness.  she had a VQ scan completed (unable to do CTA of chest because of allergy to iodine) which was negative for pulmonary emboli.  She had a 2D echocardiogram completed 1/31/19 which revealed an EF of 58%, mild LVH, and trace valvular regurgitation.  Cardiac PET scan 1/31/19 was abnormal revealing a medium size, mildly severe area of ischemia in the anterior and inferior lateral wall.       Catheterization in 2/11/2019 showed 80% distal left main, 50% proximal LAD with 100% mid LAD stenosis, 30% proximal circumflex with 50% mid circumflex, patent high large first obtuse marginal branch, 100% occluded RCA, patent LIMA to LAD with 0 distal percent LAD stenosis, patent SVG to OM 1, occluded SVG to OM 2 and occluded SVG to RCA.  Dr. Almeida recommended medical management and if this fails to consider PCI of the distal left main to help with perfusion of the distal left circumflex system.      She has mild intermittent chest pain but is not consistent and not limiting her lifestyle.  She is been taking care of her brother who had heart surgery.  She is  noticed some dizziness in the morning after she takes her carvedilol.  She notices that if she eats first, the dizziness does not seem to happen.  She has not felt her heart racing or skipping.  She has had no syncope.  She had no falls.  She has no orthopnea or PND.  She has mild exertional dyspnea which is unchanged.  She feels like her chest pain is fairly well-controlled.        Past Medical History:   Diagnosis Date   • Bronchitis 05/2016   • Carotid artery disease (CMS/[facility])     16-49% stenosis of the left internal carotid 2016; carotid duplex 1/2017 - normal   • Colon polyps    • Coronary artery disease 01/2016    Non-STEMI and status post CABG   • Dyslipidemia    • GI bleed     Severely anemic and received a blood transfusion   • Hiatal hernia    • Hyperlipidemia    • Hypertension    • Hypocalcemia    • Hypokalemia    • Non-STEMI (non-ST elevated myocardial infarction) (CMS/[facility]) 01/2016   • Obesity    • LOVE (obstructive sleep apnea)     moderate to severe; compliant with CPAP machine   • PVC (premature ventricular contraction)          Past Surgical History:   Procedure Laterality Date   • CARDIAC CATHETERIZATION N/A 2/11/2019    Procedure: Coronary angiography;  Surgeon: Cheryl Almeida MD;  Location: [facility] INVASIVE LOCATION;  Service: Cardiovascular   • CARDIAC CATHETERIZATION N/A 2/11/2019    Procedure: Left Heart Cath;  Surgeon: Cheryl Almeida MD;  Location: [facility] CATH INVASIVE LOCATION;  Service: Cardiovascular   • CARDIAC CATHETERIZATION N/A 2/11/2019    Procedure: Left ventriculography;  Surgeon: Cheryl Almeida MD;  Location: [facility] CATH INVASIVE LOCATION;  Service: Cardiovascular   • CARDIAC CATHETERIZATION  2/11/2019    Procedure: Saphenous Vein Graft;  Surgeon: Cheryl Almeida MD;  Location: [facility] CATH INVASIVE LOCATION;  Service: Cardiovascular   • CARDIAC CATHETERIZATION N/A 2/11/2019    Procedure: Native mammary injection;  Surgeon: Cheryl Almeida MD;  Location: [facility]  INVASIVE LOCATION;  Service: Cardiovascular   • CHOLECYSTECTOMY     • COLONOSCOPY N/A 6/8/2016    Procedure: COLONOSCOPY to cecum with cold biopsy polypectomies;  Surgeon: Alvarado Rivera MD;  Location: [facility] ENDOSCOPY;  Service:    • CORONARY ARTERY BYPASS GRAFT  02/28/2016    CABG x4  Dr Monroy;  CHOI to LAD, SVG to obtuse marginal 1, SVG to obtuse marginal 2, and SVG to LV branch   • ENDOSCOPY N/A 6/8/2016    Procedure: ESOPHAGOGASTRODUODENOSCOPY with biopsy;  Surgeon: Alvarado Rivera MD;  Location: [facility] ENDOSCOPY;  Service:    • ENTEROSCOPY SMALL BOWEL N/A 8/2/2017    Procedure: ENTEROSCOPY SMALL BOWEL WITH COLD BIOPSIES AND ARGON PLASMA COAGULATION TO  GASTIC ULCERS;  Surgeon: Alvarado Rivera MD;  Location: [facility] ENDOSCOPY;  Service:    • HERNIA REPAIR     • JOINT REPLACEMENT      vijaya knees   • OTHER SURGICAL HISTORY      KNEE REPLACEMENT   • OTHER SURGICAL HISTORY      TREATMENT OF ANKLE FRACTURE   • OTHER SURGICAL HISTORY      TREATMENT FRACTURE OF THE HAND       Current Outpatient Medications on File Prior to Visit   Medication Sig Dispense Refill   • aspirin 81 MG tablet Take 1 tablet by mouth daily.     • atorvastatin (LIPITOR) 40 MG tablet Take 1 tablet by mouth Daily. 90 tablet 1   • carvedilol (COREG) 25 MG tablet Take 1 tablet by mouth 2 (Two) Times a Day. 180 tablet 1   • losartan (COZAAR) 100 MG tablet TAKE 1 TABLET EVERY NIGHT 90 tablet 3   • NON FORMULARY C PAP     • pantoprazole (PROTONIX) 40 MG EC tablet TAKE 1 TABLET DAILY 90 tablet 2   • [DISCONTINUED] albuterol (PROVENTIL HFA;VENTOLIN HFA) 108 (90 BASE) MCG/ACT inhaler Inhale 2 puffs Every 4 (Four) Hours As Needed for wheezing. 1 inhaler 11   • [DISCONTINUED] doxycycline (VIBRAMYCIN) 100 MG capsule Take 1 capsule by mouth 2 (Two) Times a Day. 20 capsule 0     No current facility-administered medications on file prior to visit.        Social History     Socioeconomic History   • Marital status:      Spouse name: Not on file   • Number  "of children: Not on file   • Years of education: Not on file   • Highest education level: Not on file   Tobacco Use   • Smoking status: Never Smoker   • Smokeless tobacco: Never Used   • Tobacco comment: caffeine use   Substance and Sexual Activity   • Alcohol use: Yes     Comment: OCCASIONALLY   • Drug use: No     Comment: CAFFEINE USE    • Sexual activity: Defer           Review of Systems   Constitution: Negative.   HENT: Negative for congestion.    Eyes: Negative for vision loss in left eye and vision loss in right eye.   Respiratory: Positive for shortness of breath. Negative for cough, hemoptysis, sleep disturbances due to breathing, snoring, sputum production and wheezing.    Endocrine: Negative.    Hematologic/Lymphatic: Negative.    Skin: Negative for poor wound healing and rash.   Musculoskeletal: Negative for falls, gout, muscle cramps and myalgias.   Gastrointestinal: Negative for abdominal pain, diarrhea, dysphagia, hematemesis, melena, nausea and vomiting.   Neurological: Positive for headaches and light-headedness. Negative for excessive daytime sleepiness, dizziness, loss of balance, seizures and vertigo.   Psychiatric/Behavioral: Negative for depression and substance abuse. The patient is not nervous/anxious.        Procedures    ECG 12 Lead  Date/Time: 6/11/2019 10:51 AM  Performed by: Anastasiia Callahan MD  Authorized by: Anastasiia Callahan MD   Comparison: compared with previous ECG   Similar to previous ECG  Rhythm: sinus rhythm  T inversion: V2, V3, V4, V5 and V6    Clinical impression: abnormal EKG                Objective:      Vitals:    06/11/19 1034   BP: 118/70   Pulse: 65   Weight: 119 kg (262 lb 9.6 oz)   Height: 154.9 cm (61\")     Body mass index is 49.62 kg/m².    Physical Exam   Constitutional: She is oriented to person, place, and time. She appears well-developed and well-nourished. No distress.   HENT:   Head: Normocephalic and atraumatic.   Eyes: Conjunctivae are normal. No " scleral icterus.   Neck: Neck supple. No JVD present. Carotid bruit is not present. No thyromegaly present.   Cardiovascular: Normal rate, regular rhythm, S1 normal, S2 normal, normal heart sounds and intact distal pulses.  No extrasystoles are present. PMI is not displaced. Exam reveals no gallop.   No murmur heard.  Pulses:       Carotid pulses are 2+ on the right side, and 2+ on the left side.       Radial pulses are 2+ on the right side, and 2+ on the left side.        Dorsalis pedis pulses are 2+ on the right side, and 2+ on the left side.        Posterior tibial pulses are 2+ on the right side, and 2+ on the left side.   Pulmonary/Chest: Effort normal and breath sounds normal. No respiratory distress. She has no wheezes. She has no rhonchi. She has no rales. She exhibits no tenderness.   Abdominal: Soft. Bowel sounds are normal. She exhibits no distension, no abdominal bruit and no mass. There is no tenderness.   Musculoskeletal: She exhibits no edema or deformity.   Lymphadenopathy:     She has no cervical adenopathy.   Neurological: She is alert and oriented to person, place, and time. No cranial nerve deficit.   Skin: Skin is warm and dry. No rash noted. She is not diaphoretic. No cyanosis. No pallor. Nails show no clubbing.   Psychiatric: She has a normal mood and affect. Judgment normal.   Vitals reviewed.      Lab Review:       Assessment:      Diagnosis Plan   1. Coronary artery disease involving native coronary artery of native heart without angina pectoris     2. Cardiomyopathy, unspecified type (CMS/HCC)     3. Essential hypertension     4. Obstructive apnea     5. S/P CABG (coronary artery bypass graft)       1.                  Coronary artery bypass grafting in 01/2016.  cath done 2/2019 showed SVG to OM2 and SvG to RCA occluded.  Treated medically but if failure then consider LM intervention to help with left circumflex perfusion. Has stable angina, no changes.     2.                   Gastrointestinal bleeding in 01/2016.    3.                   Hypertension.  controlled.   4.                   Hyperlipidemia.    5.                   Obstructive sleep apnea.  on CPAP.   6.                   Right internal carotid artery stenosis, mild, in 01/2016. Normal carotid duplex 1/2017.      Plan:       Advised her to eat before she takes her a.m. carvedilol.  If she continues to have dizziness despite this, may need to decrease her carvedilol to 12 and half in the morning and maintain 25 mg at night.  She does note that when she is dizzy, her blood pressure is low, 90/50.  Continue her other medications as directed.  Advised exercise and weight loss.  See Anabel in 6 months.    Coronary Artery Disease  Assessment  • The patient has CCS class I - angina only during strenuous or prolonged physical activity    Plan  • Lifestyle modifications discussed include maintenance of a healthy weight, medication compliance, regular exercise and regular monitoring of cholesterol and blood pressure    Subjective - Objective  • There is a history of previous coronary artery bypass graft  • Current antiplatelet therapy includes aspirin 81 mg

## 2019-07-09 RX ORDER — CARVEDILOL 25 MG/1
TABLET ORAL
Qty: 180 TABLET | Refills: 1 | Status: SHIPPED | OUTPATIENT
Start: 2019-07-09 | End: 2019-12-13 | Stop reason: SDUPTHER

## 2019-08-05 ENCOUNTER — TRANSCRIBE ORDERS (OUTPATIENT)
Dept: CARDIAC REHAB | Facility: HOSPITAL | Age: 74
End: 2019-08-05

## 2019-08-05 DIAGNOSIS — Z95.1 S/P CABG (CORONARY ARTERY BYPASS GRAFT): Primary | ICD-10-CM

## 2019-09-30 RX ORDER — ATORVASTATIN CALCIUM 40 MG/1
TABLET, FILM COATED ORAL
Qty: 90 TABLET | Refills: 1 | Status: SHIPPED | OUTPATIENT
Start: 2019-09-30 | End: 2020-03-30

## 2019-11-25 RX ORDER — LOSARTAN POTASSIUM 100 MG/1
TABLET ORAL
Qty: 90 TABLET | Refills: 0 | Status: SHIPPED | OUTPATIENT
Start: 2019-11-25 | End: 2020-02-24

## 2019-12-09 ENCOUNTER — OFFICE VISIT (OUTPATIENT)
Dept: CARDIOLOGY | Facility: CLINIC | Age: 74
End: 2019-12-09

## 2019-12-09 VITALS
SYSTOLIC BLOOD PRESSURE: 130 MMHG | WEIGHT: 254.8 LBS | HEIGHT: 61 IN | HEART RATE: 59 BPM | DIASTOLIC BLOOD PRESSURE: 70 MMHG | BODY MASS INDEX: 48.11 KG/M2

## 2019-12-09 DIAGNOSIS — I42.9 CARDIOMYOPATHY, UNSPECIFIED TYPE (HCC): ICD-10-CM

## 2019-12-09 DIAGNOSIS — I25.10 CORONARY ARTERY DISEASE INVOLVING NATIVE CORONARY ARTERY OF NATIVE HEART WITHOUT ANGINA PECTORIS: Primary | ICD-10-CM

## 2019-12-09 DIAGNOSIS — G47.33 OBSTRUCTIVE APNEA: ICD-10-CM

## 2019-12-09 DIAGNOSIS — E78.5 DYSLIPIDEMIA: ICD-10-CM

## 2019-12-09 DIAGNOSIS — E66.01 CLASS 3 SEVERE OBESITY DUE TO EXCESS CALORIES WITH SERIOUS COMORBIDITY AND BODY MASS INDEX (BMI) OF 45.0 TO 49.9 IN ADULT (HCC): ICD-10-CM

## 2019-12-09 DIAGNOSIS — R06.02 SHORTNESS OF BREATH: ICD-10-CM

## 2019-12-09 DIAGNOSIS — I10 ESSENTIAL HYPERTENSION: ICD-10-CM

## 2019-12-09 PROBLEM — R94.39 ABNORMAL NUCLEAR STRESS TEST: Status: RESOLVED | Noted: 2019-02-05 | Resolved: 2019-12-09

## 2019-12-09 PROBLEM — Z95.1 S/P CABG (CORONARY ARTERY BYPASS GRAFT): Status: RESOLVED | Noted: 2018-01-16 | Resolved: 2019-12-09

## 2019-12-09 PROCEDURE — 99214 OFFICE O/P EST MOD 30 MIN: CPT | Performed by: NURSE PRACTITIONER

## 2019-12-09 PROCEDURE — 93000 ELECTROCARDIOGRAM COMPLETE: CPT | Performed by: NURSE PRACTITIONER

## 2019-12-09 NOTE — PROGRESS NOTES
Date of Office Visit: 2019  Encounter Provider: MANDIE Silverio  Place of Service: [facility] CARDIOLOGY  Patient Name: Julee King  :1945   Primary Cardiologist: Dr. nAastasiia Callahan    Chief Complaint   Patient presents with   • Coronary Artery Disease   • Follow-up   :     HPI: Julee King is a 74 y.o. female who presents today for cardiac follow up. She has been diagnosed with hypertension, hyperlipidemia, carotid artery disease, obstructive sleep apnea, and obesity.      In 2016, she was diagnosed with coronary artery disease, left ventricular dysfunction with an EF of 33%, and underwent coronary artery bypass graft surgery.  She was severely anemic from a GI bleed at that time as well.  Her ejection fraction then normalized.     In 2019, she was experiencing worsening dyspnea and chest heaviness.  D-dimer was elevated and she had a VQ scan completed which was negative for pulmonary emboli.  A 2D echocardiogram completed 2019 revealed the following: EF of 58%, mild LVH, and trace valvular regurgitation.  Cardiac PET scan 19 was abnormal revealing a medium size, mildly severe area of ischemia in the anterior and inferior lateral wall.  On 2019 she underwent cardiac catheterization which revealed the following: multivessel coronary artery disease with 2/4 patent grafts. Dr. Almeida recommended medical management and if this fails to consider PCI of the distal left main to help with perfusion of the distal left circumflex system.    In 2019 she followed up with Dr. Callahan and she was experiencing mild intermittent chest pain and dizziness.  She felt that the chest pain was stable and her blood pressure was occasionally low.  Dr. Callahan said we could always adjust her beta-blocker.    She presents today for follow-up.  She continues to have chronic dyspnea and lower extremity edema, both unchanged.  On occasion  she may have some light headedness.  She denies any chest pressure or tightness.  Occasionally she will feel a twinge in her chest that lasts a few seconds and is nonexertional.  She denies palpitations or syncope.  Her blood pressure and heart rate are both normal today.    I reviewed her last blood work.  On 1/17/2019 she had blood work completed which included a CMP which was normal except for glucose of 124.  CBC showed hemoglobin of 15 and hematocrit 47.3.  Total cholesterol 132, triglycerides 85, HDL 40, LDL 75.    Past Medical History:   Diagnosis Date   • Bronchitis 05/2016   • Cardiomyopathy (CMS/[facility])    • Carotid artery disease (CMS/[facility])     16-49% stenosis of the left internal carotid 2016; carotid duplex 1/2017 - normal   • Colon polyps    • Coronary artery disease 01/2016    Non-STEMI and status post CABG   • Dyslipidemia    • GI bleed     Severely anemic and received a blood transfusion   • Hiatal hernia    • Hyperlipidemia    • Hypertension    • Hypocalcemia    • Hypokalemia    • Non-STEMI (non-ST elevated myocardial infarction) (CMS/[facility]) 01/2016   • Obesity    • LOVE (obstructive sleep apnea)     moderate to severe; compliant with CPAP machine   • PVC (premature ventricular contraction)        Past Surgical History:   Procedure Laterality Date   • CARDIAC CATHETERIZATION N/A 2/11/2019    Procedure: Coronary angiography;  Surgeon: Cheryl Almeida MD;  Location: [facility] CATH INVASIVE LOCATION;  Service: Cardiovascular   • CARDIAC CATHETERIZATION N/A 2/11/2019    Procedure: Left Heart Cath;  Surgeon: Cheryl Almeida MD;  Location: [facility] CATH INVASIVE LOCATION;  Service: Cardiovascular   • CARDIAC CATHETERIZATION N/A 2/11/2019    Procedure: Left ventriculography;  Surgeon: Cheryl Almeida MD;  [facility];  Service: Cardiovascular   • CARDIAC CATHETERIZATION  2/11/2019    Procedure: Saphenous Vein Graft;  Surgeon: Cheryl Almeida MD;  Location: [facility] CATH INVASIVE  LOCATION;  Service: Cardiovascular   • CARDIAC CATHETERIZATION N/A 2/11/2019    Procedure: Native mammary injection;  Surgeon: Cheryl Almeida MD;  Location: [facility] CATH INVASIVE LOCATION;  Service: Cardiovascular   • CHOLECYSTECTOMY     • COLONOSCOPY N/A 6/8/2016    Procedure: COLONOSCOPY to cecum with cold biopsy polypectomies;  Surgeon: Alvarado Rivera MD;  Location: [facility] ENDOSCOPY;  Service:    • CORONARY ARTERY BYPASS GRAFT  02/28/2016    CABG x4  Dr Monroy;  CHOI to LAD, SVG to obtuse marginal 1, SVG to obtuse marginal 2, and SVG to LV branch   • ENDOSCOPY N/A 6/8/2016    Procedure: ESOPHAGOGASTRODUODENOSCOPY with biopsy;  Surgeon: Alvarado Rivera MD;  Location: [facility] ENDOSCOPY;  Service:    • ENTEROSCOPY SMALL BOWEL N/A 8/2/2017    Procedure: ENTEROSCOPY SMALL BOWEL WITH COLD BIOPSIES AND ARGON PLASMA COAGULATION TO  GASTIC ULCERS;  Surgeon: Alvarado Rivera MD;  Location: [facility] ENDOSCOPY;  Service:    • HERNIA REPAIR     • JOINT REPLACEMENT      vijaya knees   • OTHER SURGICAL HISTORY      KNEE REPLACEMENT   • OTHER SURGICAL HISTORY      TREATMENT OF ANKLE FRACTURE   • OTHER SURGICAL HISTORY      TREATMENT FRACTURE OF THE HAND       Social History     Socioeconomic History   • Marital status:      Spouse name: Not on file   • Number of children: Not on file   • Years of education: Not on file   • Highest education level: Not on file   Tobacco Use   • Smoking status: Never Smoker   • Smokeless tobacco: Never Used   Substance and Sexual Activity   • Alcohol use: Yes     Comment: caffeine use   • Drug use: No     Comment: CAFFEINE USE    • Sexual activity: Defer       Family History   Problem Relation Age of Onset   • Coronary artery disease Mother    • Other Father    • Coronary artery disease Father    • Stroke Sister    • Heart attack Sister        Review of Systems   Constitution: Negative for chills, diaphoresis, fever, night sweats, weight gain and weight loss.   HENT: Negative for hearing loss,  "nosebleeds, sore throat and tinnitus.    Eyes: Negative for blurred vision, double vision, pain and visual disturbance.   Cardiovascular: Positive for dyspnea on exertion. Negative for chest pain, claudication, cyanosis, irregular heartbeat, leg swelling, near-syncope, orthopnea, palpitations, paroxysmal nocturnal dyspnea and syncope.   Respiratory: Positive for shortness of breath. Negative for cough, hemoptysis, snoring and wheezing.    Endocrine: Negative for cold intolerance, heat intolerance and polyuria.   Hematologic/Lymphatic: Negative for bleeding problem. Does not bruise/bleed easily.   Skin: Negative for color change, dry skin, flushing and itching.   Musculoskeletal: Negative for falls, joint pain, joint swelling, muscle cramps, muscle weakness and myalgias.   Gastrointestinal: Negative for abdominal pain, constipation, heartburn, melena, nausea and vomiting.   Genitourinary: Negative for dysuria and hematuria.   Neurological: Positive for headaches and light-headedness. Negative for excessive daytime sleepiness, dizziness, loss of balance, numbness, paresthesias, seizures and vertigo.   Psychiatric/Behavioral: Negative for altered mental status, depression, memory loss and substance abuse. The patient does not have insomnia and is not nervous/anxious.    Allergic/Immunologic: Negative for environmental allergies.       Allergies   Allergen Reactions   • Codeine      \"makes me loopy\"   • Iodinated Diagnostic Agents      itching   • Sulfa Antibiotics Unknown (See Comments)     NOT SURE         Current Outpatient Medications:   •  aspirin 81 MG tablet, Take 1 tablet by mouth daily., Disp: , Rfl:   •  atorvastatin (LIPITOR) 40 MG tablet, TAKE 1 TABLET DAILY, Disp: 90 tablet, Rfl: 1  •  carvedilol (COREG) 25 MG tablet, TAKE 1 TABLET TWICE A DAY, Disp: 180 tablet, Rfl: 1  •  losartan (COZAAR) 100 MG tablet, TAKE 1 TABLET EVERY NIGHT, Disp: 90 tablet, Rfl: 0  •  NON FORMULARY, C PAP, Disp: , Rfl:     " "  Objective:     Vitals:    12/09/19 1111 12/09/19 1118   BP: 126/70 130/70   BP Location: Left arm Right arm   Pulse: 59    Weight: 116 kg (254 lb 12.8 oz)    Height: 154.9 cm (61\")      Body mass index is 48.14 kg/m².    PHYSICAL EXAM:    Vitals Reviewed.   General Appearance: No acute distress, well developed and well nourished. Obese.   Eyes: Conjunctiva and lids: No erythema, swelling, or discharge. Sclera non-icteric.   HENT: Atraumatic, normocephalic. External eyes, ears, and nose normal. No hearing loss noted. Mucous membranes normal. Lips not cyanotic. Neck supple with no tenderness.  Respiratory: No signs of respiratory distress. Respiration rhythm and depth normal.   Clear to auscultation. No rales, crackles, rhonchi, or wheezing auscultated.   Cardiovascular:  Jugular Venous Pressure: Normal  Heart Rate and Rhythm: Normal, Heart Sounds: Normal S1 and S2. No S3 or S4 noted.  Murmurs: No murmurs noted. No rubs, thrills, or gallops.   Lower Extremities: Bilateral trace lower extremity edema noted.  Gastrointestinal:  Abdomen soft, non-distended, non-tender. Normal bowel sounds. No hepatomegaly.   Musculoskeletal: Normal movement of extremities  Skin and Nails: General appearance normal. No pallor, cyanosis, diaphoresis. Skin temperature normal. No clubbing of fingernails.   Psychiatric: Patient alert and oriented to person, place, and time. Speech and behavior appropriate. Normal mood and affect.       ECG 12 Lead  Date/Time: 12/9/2019 11:14 AM  Performed by: Anabel Wood APRN  Authorized by: Anabel Wood APRN   Comparison: compared with previous ECG from 2/20/2019  Similar to previous ECG  Comparison to previous ECG: NSR with PVCs  Rhythm: sinus rhythm  Rate: normal  BPM: 59  Conduction: conduction normal  ST Segments: ST segments normal  T inversion: V2, V3, V4, V5 and V6  QRS axis: left  Other findings: poor R wave progression    Clinical impression: abnormal EKG              Assessment:       " "Diagnosis Plan   1. Coronary artery disease involving native coronary artery of native heart without angina pectoris     2. Cardiomyopathy, unspecified type (CMS/HCC)     3. Shortness of breath     4. Essential hypertension     5. Dyslipidemia     6. Class 3 severe obesity due to excess calories with serious comorbidity and body mass index (BMI) of 45.0 to 49.9 in adult (CMS/[facility])     7. Obstructive apnea            Plan:       1.  Coronary Artery Disease: Status post CABG January 2016.  Her last cardiac catheterization showed 2/4 patent bypass grafts and medical treatment was recommended.  She denies chest pain with exertion and occasionally will have a \"twinge\" that lasts a second.  I have asked her to monitor symptoms and if she starts to develop her anginal symptoms from the past she will need to contact our office.    Coronary Artery Disease  Assessment  • The patient has no angina    Plan  • Lifestyle modifications discussed include adhering to a heart healthy diet, avoidance of tobacco products, maintenance of a healthy weight, medication compliance, regular exercise and regular monitoring of cholesterol and blood pressure    Subjective - Objective  • There is a history of previous coronary artery bypass graft  • Current antiplatelet therapy includes aspirin 81 mg      2.  Cardiomyopathy: EF normalized to 58 % per last echocardiogram.      3.  Shortness of Breath: Chronic and unchanged.    4.  Hypertension: Blood pressure is well controlled today.      5.  Hyperlipidemia: Last lipid panel was excellent and she remains on atorvastatin.    6.  Obesity: BMI is 48.2.  She would benefit from exercise, weight loss, and heart healthy diet.    7.  Obstructive Sleep Apnea: Compliant with CPAP machine.    8.  I recommended follow-up with Dr. Callahan in 1 year, unless otherwise needed sooner.    As always, it has been a pleasure to participate in your patient's care.      Sincerely,         Anabel Wood, " APRN

## 2019-12-13 RX ORDER — CARVEDILOL 25 MG/1
TABLET ORAL
Qty: 180 TABLET | Refills: 4 | Status: SHIPPED | OUTPATIENT
Start: 2019-12-13 | End: 2021-03-09

## 2020-01-16 ENCOUNTER — OFFICE VISIT (OUTPATIENT)
Dept: FAMILY MEDICINE CLINIC | Facility: CLINIC | Age: 75
End: 2020-01-16

## 2020-01-16 VITALS
BODY MASS INDEX: 47.77 KG/M2 | TEMPERATURE: 98.2 F | DIASTOLIC BLOOD PRESSURE: 80 MMHG | OXYGEN SATURATION: 94 % | HEIGHT: 61 IN | HEART RATE: 83 BPM | WEIGHT: 253 LBS | SYSTOLIC BLOOD PRESSURE: 138 MMHG

## 2020-01-16 DIAGNOSIS — R05.9 COUGH: Primary | ICD-10-CM

## 2020-01-16 DIAGNOSIS — J30.9 ALLERGIC RHINITIS, UNSPECIFIED SEASONALITY, UNSPECIFIED TRIGGER: ICD-10-CM

## 2020-01-16 DIAGNOSIS — J01.00 ACUTE MAXILLARY SINUSITIS, RECURRENCE NOT SPECIFIED: ICD-10-CM

## 2020-01-16 PROCEDURE — 99213 OFFICE O/P EST LOW 20 MIN: CPT | Performed by: NURSE PRACTITIONER

## 2020-01-16 RX ORDER — AMOXICILLIN 875 MG/1
875 TABLET, COATED ORAL 2 TIMES DAILY
Qty: 14 TABLET | Refills: 0 | Status: SHIPPED | OUTPATIENT
Start: 2020-01-16 | End: 2020-01-23

## 2020-01-16 RX ORDER — BENZONATATE 100 MG/1
100 CAPSULE ORAL 3 TIMES DAILY PRN
Qty: 21 CAPSULE | Refills: 0 | Status: SHIPPED | OUTPATIENT
Start: 2020-01-16 | End: 2020-12-09

## 2020-01-16 RX ORDER — FLUTICASONE PROPIONATE 50 MCG
2 SPRAY, SUSPENSION (ML) NASAL DAILY
Qty: 1 BOTTLE | Refills: 3 | Status: SHIPPED | OUTPATIENT
Start: 2020-01-16 | End: 2020-12-09

## 2020-01-16 NOTE — PROGRESS NOTES
Subjective   Julee King is a 74 y.o. female.     History of Present Illness   C/o nasal congestion, cough, wheezing, states congestion started about 2-3 months ago, she does not take allergy meds, states cough worse a couple of days ago, denies fever, she has productive cough, she has drainage, she does have sinus pressure, ear pain, she does have sore throat. She did not try anything OTC.   Also c/o lesion left forearm, denies any injury, noticed about 2 days ago, unsure if she hit arm, noticed bruising, denies pain, noticed cyst like area on fore arm.     The following portions of the patient's history were reviewed and updated as appropriate: allergies, current medications, past family history, past medical history, past social history, past surgical history and problem list.    Review of Systems   Constitutional: Negative for chills, diaphoresis and fever.   HENT: Positive for congestion, rhinorrhea and sinus pressure. Negative for nosebleeds, postnasal drip and sore throat.    Respiratory: Positive for cough and wheezing. Negative for shortness of breath.    Cardiovascular: Negative for chest pain.   Musculoskeletal: Negative for arthralgias and myalgias.   Skin: Positive for skin lesions.   Allergic/Immunologic: Positive for environmental allergies.   Neurological: Negative for dizziness, light-headedness and headache.   All other systems reviewed and are negative.      Objective   Physical Exam   Constitutional: She is oriented to person, place, and time. She appears well-developed and well-nourished.   HENT:   Head: Normocephalic.   Right Ear: Tympanic membrane and ear canal normal.   Left Ear: Tympanic membrane and ear canal normal.   Nose: Mucosal edema present. Right sinus exhibits maxillary sinus tenderness. Left sinus exhibits maxillary sinus tenderness.   Mouth/Throat: Uvula is midline and mucous membranes are normal. Posterior oropharyngeal erythema present.   Eyes: Pupils are equal, round, and  reactive to light.   Neck: Normal range of motion.   Cardiovascular: Normal rate, regular rhythm and normal heart sounds.   Pulmonary/Chest: Effort normal and breath sounds normal. No respiratory distress. She has no decreased breath sounds. She has no wheezes. She has no rhonchi. She has no rales.   Musculoskeletal: Normal range of motion.   Lymphadenopathy:     She has no cervical adenopathy.   Neurological: She is alert and oriented to person, place, and time.   Skin: Skin is warm and dry. Ecchymosis and lesion noted.        Psychiatric: She has a normal mood and affect. Her behavior is normal.   Nursing note and vitals reviewed.        Assessment/Plan   Julee was seen today for nasal congestion, cough and wheezing.    Diagnoses and all orders for this visit:    Cough    Allergic rhinitis, unspecified seasonality, unspecified trigger    Acute maxillary sinusitis, recurrence not specified    Other orders  -     benzonatate (TESSALON PERLES) 100 MG capsule; Take 1 capsule by mouth 3 (Three) Times a Day As Needed for Cough.  -     fluticasone (FLONASE) 50 MCG/ACT nasal spray; 2 sprays into the nostril(s) as directed by provider Daily.  -     amoxicillin (AMOXIL) 875 MG tablet; Take 1 tablet by mouth 2 (Two) Times a Day for 7 days.    Start amoxicillin 875mg bid for 7 days.   Tessalon perles as needed for cough.   flonase 2 sprays each nostril daily.   She will apply ice and elevation, she will monitor lesion as it is not bothering her today, f/u if symptoms persist.   Increase fluid intake, get plenty of rest.   Patient agrees with plan of care and understands instructions. Call if worsening symptoms or any problems or concerns.

## 2020-02-24 RX ORDER — LOSARTAN POTASSIUM 100 MG/1
TABLET ORAL
Qty: 90 TABLET | Refills: 3 | Status: SHIPPED | OUTPATIENT
Start: 2020-02-24 | End: 2020-06-01 | Stop reason: SDUPTHER

## 2020-03-30 RX ORDER — ATORVASTATIN CALCIUM 40 MG/1
TABLET, FILM COATED ORAL
Qty: 90 TABLET | Refills: 0 | Status: SHIPPED | OUTPATIENT
Start: 2020-03-30 | End: 2020-06-26

## 2020-06-01 ENCOUNTER — TELEPHONE (OUTPATIENT)
Dept: CARDIOLOGY | Facility: CLINIC | Age: 75
End: 2020-06-01

## 2020-06-01 ENCOUNTER — PATIENT MESSAGE (OUTPATIENT)
Dept: CARDIOLOGY | Facility: CLINIC | Age: 75
End: 2020-06-01

## 2020-06-01 RX ORDER — LOSARTAN POTASSIUM 100 MG/1
100 TABLET ORAL NIGHTLY
Qty: 90 TABLET | Refills: 0 | Status: SHIPPED | OUTPATIENT
Start: 2020-06-01 | End: 2021-05-10

## 2020-06-01 NOTE — TELEPHONE ENCOUNTER
----- Message from Julee King sent at 6/1/2020  4:01 PM EDT -----  Regarding: RE: Prescription Question  Contact: [phone]  [facility] ON [address]  ----- Message -----  From: MANDIE Silverio  Sent: 6/1/2020  2:49 PM EDT  To: Julee King  Subject: RE: Prescription Question    I would recommend having losartan filled at a local pharmacy for 30 or 90 days-which would be your choice.  Please let me know where you would like me to send it.      ----- Message -----     From: Julee King     Sent: 6/1/2020 10:16 AM EDT       To: Anastasiia Callahan MD  Subject: Prescription Question    LOSARTAN TABS 100MG IS ON BACK ORDER AT [facility]   I HAVE 5 LEFT  DO YOU WANT TO ORDER SOMETHING DIFFERENT

## 2020-06-01 NOTE — TELEPHONE ENCOUNTER
From: Julee King  To: Anastasiia Callahan MD  Sent: 6/1/2020 10:16 AM EDT  Subject: Prescription Question    LOSARTAN TABS 100MG IS ON BACK ORDER AT [facility]   I HAVE 5 LEFT  DO YOU WANT TO ORDER SOMETHING DIFFERENT

## 2020-06-26 RX ORDER — ATORVASTATIN CALCIUM 40 MG/1
TABLET, FILM COATED ORAL
Qty: 90 TABLET | Refills: 1 | Status: SHIPPED | OUTPATIENT
Start: 2020-06-26 | End: 2020-12-23

## 2020-09-01 NOTE — PROGRESS NOTES
Date of Office Visit: 2018  Encounter Provider: MANDIE Silverio  Place of Service: [facility] CARDIOLOGY  Patient Name: Julee King  :1945    Chief Complaint   Patient presents with   • Coronary Artery Disease   :     HPI: Julee King is a 73 y.o. female who presents today for cardiac follow up. She has a history of hiatal hernia, hypertension, hyperlipidemia, obstructive sleep apnea, and obesity.  She had chest pain in  and had a cardiac PET scan which showed no evidence of ischemia and echocardiogram which revealed an EF of 65%, grade 1 diastolic dysfunction, and mild LVH.  She was referred to [facility] and was found to have moderate to severe sleep apnea.    She presented to the hospital in 2016 with chest pressure and was found to be severely anemic from a GI bleed.  Her troponin elevated at 4.  Echocardiogram revealed an EF of 33% with mild to moderate mitral insufficiency.  She underwent cardiac catheterization and subsequently coronary artery bypass grafting in 2016 with LIMA to LAD, SVG to obtuse marginal 1, SVG to obtuse marginal 2, and SVG to LV branch.  She did well postoperatively.  She had a carotid study completed which showed 16-49% stenosis of the left internal carotid artery.  She had a follow-up carotid duplex in 2017 which was normal.  Her ejection fraction was rechecked in 2017 with an echocardiogram which showed improvement with an EF of 54%.    She presents today for follow-up.  She denies chest pain, PND, orthopnea, palpitations, dizziness, or syncope.  She has chronic shortness of breath with exertion that resolves with rest and she feels that this unchanged.  She does experience chronic left lower extremity edema secondary to vein harvesting.  Her blood pressure and heart rate are both well-controlled.  On her last office visit with Dr. Callahan her losartan was increased and she  31-Aug-2020 19:03 has not had a repeat BMP.    The following portion of the patient's history were reviewed and updated as appropriate: past medical history, past surgical history, past social history, past family history, allergies, current medications, and problem list.    Past Medical History:   Diagnosis Date   • Bronchitis 05/2016   • Carotid artery disease (CMS/Formerly [facility])     16-49% stenosis of the left internal carotid 2016; carotid duplex 1/2017 - normal   • Colon polyps    • Coronary artery disease 01/2016    Non-STEMI and status post CABG   • Dyslipidemia    • GI bleed     Severely anemic and received a blood transfusion   • Hiatal hernia    • Hypertension    • Hypocalcemia    • Hypokalemia    • Non-STEMI (non-ST elevated myocardial infarction) (CMS/Formerly [facility]) 01/2016   • Obesity    • LOVE (obstructive sleep apnea)     moderate to severe; compliant with CPAP machine       Past Surgical History:   Procedure Laterality Date   • CHOLECYSTECTOMY     • COLONOSCOPY N/A 6/8/2016    Procedure: COLONOSCOPY to cecum with cold biopsy polypectomies;  Surgeon: Alvarado Rivera MD;  Location: [facility] ENDOSCOPY;  Service:    • CORONARY ARTERY BYPASS GRAFT  02/28/2016    CABG x4  Dr Monroy;  CHOI to LAD, SVG to obtuse marginal 1, SVG to obtuse marginal 2, and SVG to LV branch   • ENDOSCOPY N/A 6/8/2016    Procedure: ESOPHAGOGASTRODUODENOSCOPY with biopsy;  Surgeon: Alvarado Rivera MD;  Location: [facility] ENDOSCOPY;  Service:    • ENTEROSCOPY SMALL BOWEL N/A 8/2/2017    Procedure: ENTEROSCOPY SMALL BOWEL WITH COLD BIOPSIES AND ARGON PLASMA COAGULATION TO  GASTIC ULCERS;  Surgeon: Alvarado Rivera MD;  Location: [facility] ENDOSCOPY;  Service:    • HERNIA REPAIR     • JOINT REPLACEMENT      vijaya knees   • OTHER SURGICAL HISTORY      KNEE REPLACEMENT   • OTHER SURGICAL HISTORY      TREATMENT OF ANKLE FRACTURE   • OTHER SURGICAL HISTORY      TREATMENT FRACTURE OF THE HAND       Social History     Social History   • Marital status:      Spouse name: N/A   •  Number of children: N/A   • Years of education: N/A     Occupational History   • Not on file.     Social History Main Topics   • Smoking status: Never Smoker   • Smokeless tobacco: Never Used      Comment: caffeine use   • Alcohol use Yes      Comment: OCCASIONALLY   • Drug use: No      Comment: CAFFEINE USE    • Sexual activity: Not on file     Other Topics Concern   • Not on file     Social History Narrative   • No narrative on file       Family History   Problem Relation Age of Onset   • Coronary artery disease Mother    • Other Father    • Stroke Sister        Review of Systems   Constitution: Negative for chills, diaphoresis, fever, malaise/fatigue, night sweats, weight gain and weight loss.   HENT: Negative for hearing loss, nosebleeds, sore throat and tinnitus.    Eyes: Negative for blurred vision, double vision, pain and visual disturbance.   Cardiovascular: Negative for chest pain, claudication, cyanosis, dyspnea on exertion, irregular heartbeat, leg swelling, near-syncope, orthopnea, palpitations, paroxysmal nocturnal dyspnea and syncope.   Respiratory: Negative for cough, hemoptysis, shortness of breath, snoring and wheezing.    Endocrine: Negative for cold intolerance, heat intolerance and polyuria.   Hematologic/Lymphatic: Negative for bleeding problem. Does not bruise/bleed easily.   Skin: Negative for color change, dry skin, flushing and itching.   Musculoskeletal: Negative for falls, joint pain, joint swelling, muscle cramps, muscle weakness and myalgias.   Gastrointestinal: Negative for abdominal pain, constipation, heartburn, melena, nausea and vomiting.   Genitourinary: Negative for dysuria and hematuria.   Neurological: Negative for excessive daytime sleepiness, dizziness, light-headedness, loss of balance, numbness, paresthesias, seizures and vertigo.   Psychiatric/Behavioral: Negative for altered mental status, depression, memory loss and substance abuse. The patient does not have insomnia and  "is not nervous/anxious.    Allergic/Immunologic: Negative for environmental allergies.       Allergies   Allergen Reactions   • Codeine      \"makes me loopy\"   • Iodinated Diagnostic Agents      itching   • Sulfa Antibiotics          Current Outpatient Prescriptions:   •  albuterol (PROVENTIL HFA;VENTOLIN HFA) 108 (90 BASE) MCG/ACT inhaler, Inhale 2 puffs Every 4 (Four) Hours As Needed for wheezing., Disp: 1 inhaler, Rfl: 11  •  aspirin 81 MG tablet, Take 1 tablet by mouth daily., Disp: , Rfl:   •  atorvastatin (LIPITOR) 40 MG tablet, TAKE 1 TABLET DAILY, Disp: 90 tablet, Rfl: 0  •  carvedilol (COREG) 12.5 MG tablet, TAKE 1 TABLET TWICE A DAY, Disp: 180 tablet, Rfl: 2  •  losartan (COZAAR) 100 MG tablet, Take 1 tablet by mouth Every Night., Disp: 90 tablet, Rfl: 3  •  pantoprazole (PROTONIX) 40 MG EC tablet, TAKE 1 TABLET DAILY, Disp: 90 tablet, Rfl: 2      Objective:     Vitals:    07/16/18 1102   BP: 130/80   Pulse: 63   Weight: 117 kg (258 lb 6.4 oz)   Height: 154.9 cm (61\")     Body mass index is 48.82 kg/m².    PHYSICAL EXAM:    Vitals Reviewed.   General Appearance: No acute distress, well developed and well nourished. Obese.   Eyes: Conjunctiva and lids: No erythema, swelling, or discharge. Sclera non-icteric.   HENT: Atraumatic, normocephalic. External eyes, ears, and nose normal. No hearing loss noted. Mucous membranes normal. Lips not cyanotic. Neck supple with no tenderness.  Respiratory: No signs of respiratory distress. Respiration rhythm and depth normal.   Clear to auscultation. No rales, crackles, rhonchi, or wheezing auscultated.   Cardiovascular:  Jugular Venous Pressure: Normal  Heart Rate and Rhythm: Normal, Heart Sounds: Normal S1 and S2. No S3 or S4 noted.  Murmurs: No murmurs noted. No rubs, thrills, or gallops.   Arterial Pulses: Carotid pulses normal. No carotid bruit noted. Posterior tibialis and dorsalis pedis pulses normal.   Lower Extremities: Left lower extremity edema " noted.  Gastrointestinal:  Abdomen soft, non-distended, non-tender. Normal bowel sounds. No hepatomegaly.   Musculoskeletal: Normal movement of extremities  Skin and Nails: General appearance normal. No pallor, cyanosis, diaphoresis. Skin temperature normal. No clubbing of fingernails.   Psychiatric: Patient alert and oriented to person, place, and time. Speech and behavior appropriate. Normal mood and affect.       ECG 12 Lead  Date/Time: 7/16/2018 10:59 AM  Performed by: BAN QUIROGA  Authorized by: BAN QUIROGA   Comparison: compared with previous ECG from 1/16/2018  Similar to previous ECG  Rhythm: sinus rhythm  Rate: normal  BPM: 63  Conduction: conduction normal  ST Segments: ST segments normal  T Waves: T waves normal  Q waves: III and aVF  Clinical impression: abnormal ECG              Assessment:       Diagnosis Plan   1. Coronary artery disease involving native coronary artery of native heart without angina pectoris     2. S/P CABG (coronary artery bypass graft)     3. Cardiomyopathy, unspecified type (CMS/HCC)     4. Shortness of breath     5. Essential hypertension  Basic Metabolic Panel   6. Obstructive apnea     7. Class 3 obesity due to excess calories with serious comorbidity and body mass index (BMI) of 45.0 to 49.9 in adult (CMS/HCC)            Plan:       1.  Coronary Artery Disease: Status post CABG January 2016.  EF is now improved to 54%.  Continue medications for secondary prevention. EKG unchanged and denies angina.     Coronary Artery Disease  Assessment  • The patient has no angina    Plan  • Lifestyle modifications discussed include adhering to a heart healthy diet, avoidance of tobacco products, maintenance of a healthy weight, medication compliance, regular exercise and regular monitoring of cholesterol and blood pressure    Subjective - Objective  • There is a history of previous coronary artery bypass graft  • Current antiplatelet therapy includes aspirin 81 mg      2.   01-Sep-2020 Cardiomyopathy: EF normalized to 54% per echocardiogram 2017.  3.  Shortness of breath: She has chronic shortness of breath which is unchanged and most likely multifactorial due to weight and deconditioning.  4.  Hypertension: Blood pressure is well-controlled.  On her last visit she was started on losartan and I have recommended a repeat BMP.  5.  Obstructive Sleep Apnea: Compliant with CPAP machine.  6.  Obesity: BMI is 48.8.  Should benefit from regular exercise and weight loss.  7.  Follow up with Dr. Callahan in one year, unless otherwise needed sooner.    As always, it has been a pleasure to participate in your patient's care.      Sincerely,         MANDIE Rodriguez

## 2020-11-17 ENCOUNTER — CLINICAL SUPPORT (OUTPATIENT)
Dept: FAMILY MEDICINE CLINIC | Facility: CLINIC | Age: 75
End: 2020-11-17

## 2020-11-17 DIAGNOSIS — Z23 IMMUNIZATION DUE: ICD-10-CM

## 2020-11-17 PROCEDURE — G0009 ADMIN PNEUMOCOCCAL VACCINE: HCPCS | Performed by: FAMILY MEDICINE

## 2020-11-17 PROCEDURE — 90732 PPSV23 VACC 2 YRS+ SUBQ/IM: CPT | Performed by: FAMILY MEDICINE

## 2020-12-09 ENCOUNTER — TELEPHONE (OUTPATIENT)
Dept: CARDIOLOGY | Facility: CLINIC | Age: 75
End: 2020-12-09

## 2020-12-09 ENCOUNTER — OFFICE VISIT (OUTPATIENT)
Dept: CARDIOLOGY | Facility: CLINIC | Age: 75
End: 2020-12-09

## 2020-12-09 ENCOUNTER — HOSPITAL ENCOUNTER (OUTPATIENT)
Dept: CARDIOLOGY | Facility: HOSPITAL | Age: 75
Discharge: HOME | End: 2020-12-09
Admitting: INTERNAL MEDICINE

## 2020-12-09 VITALS
HEART RATE: 60 BPM | BODY MASS INDEX: 51.01 KG/M2 | HEIGHT: 61 IN | SYSTOLIC BLOOD PRESSURE: 132 MMHG | DIASTOLIC BLOOD PRESSURE: 70 MMHG | WEIGHT: 270.2 LBS

## 2020-12-09 DIAGNOSIS — I10 ESSENTIAL HYPERTENSION: ICD-10-CM

## 2020-12-09 DIAGNOSIS — I25.10 CORONARY ARTERY DISEASE INVOLVING NATIVE CORONARY ARTERY OF NATIVE HEART WITHOUT ANGINA PECTORIS: ICD-10-CM

## 2020-12-09 DIAGNOSIS — E66.01 CLASS 3 SEVERE OBESITY DUE TO EXCESS CALORIES WITH SERIOUS COMORBIDITY AND BODY MASS INDEX (BMI) OF 50.0 TO 59.9 IN ADULT (HCC): ICD-10-CM

## 2020-12-09 DIAGNOSIS — I25.10 CORONARY ARTERY DISEASE INVOLVING NATIVE CORONARY ARTERY OF NATIVE HEART WITHOUT ANGINA PECTORIS: Primary | ICD-10-CM

## 2020-12-09 DIAGNOSIS — G47.33 OBSTRUCTIVE APNEA: ICD-10-CM

## 2020-12-09 DIAGNOSIS — E78.5 DYSLIPIDEMIA: ICD-10-CM

## 2020-12-09 LAB
ALBUMIN SERPL-MCNC: 3.8 G/DL (ref 3.5–5.2)
ALBUMIN/GLOB SERPL: 1.3 G/DL
ALP SERPL-CCNC: 125 U/L (ref 39–117)
ALT SERPL W P-5'-P-CCNC: 15 U/L (ref 1–33)
ANION GAP SERPL CALCULATED.3IONS-SCNC: 8.1 MMOL/L (ref 5–15)
AST SERPL-CCNC: 11 U/L (ref 1–32)
BASOPHILS # BLD AUTO: 0.03 10*3/MM3 (ref 0–0.2)
BASOPHILS NFR BLD AUTO: 0.5 % (ref 0–1.5)
BILIRUB SERPL-MCNC: 0.7 MG/DL (ref 0–1.2)
BUN SERPL-MCNC: 16 MG/DL (ref 8–23)
BUN/CREAT SERPL: 24.6 (ref 7–25)
CALCIUM SPEC-SCNC: 8.5 MG/DL (ref 8.6–10.5)
CHLORIDE SERPL-SCNC: 110 MMOL/L (ref 98–107)
CHOLEST SERPL-MCNC: 112 MG/DL (ref 0–200)
CO2 SERPL-SCNC: 22.9 MMOL/L (ref 22–29)
CREAT SERPL-MCNC: 0.65 MG/DL (ref 0.57–1)
DEPRECATED RDW RBC AUTO: 42.5 FL (ref 37–54)
EOSINOPHIL # BLD AUTO: 0.11 10*3/MM3 (ref 0–0.4)
EOSINOPHIL NFR BLD AUTO: 2 % (ref 0.3–6.2)
ERYTHROCYTE [DISTWIDTH] IN BLOOD BY AUTOMATED COUNT: 14 % (ref 12.3–15.4)
GFR SERPL CREATININE-BSD FRML MDRD: 89 ML/MIN/1.73
GLOBULIN UR ELPH-MCNC: 2.9 GM/DL
GLUCOSE SERPL-MCNC: 117 MG/DL (ref 65–99)
HCT VFR BLD AUTO: 41.9 % (ref 34–46.6)
HDLC SERPL-MCNC: 37 MG/DL (ref 40–60)
HGB BLD-MCNC: 13.4 G/DL (ref 12–15.9)
IMM GRANULOCYTES # BLD AUTO: 0.02 10*3/MM3 (ref 0–0.05)
IMM GRANULOCYTES NFR BLD AUTO: 0.4 % (ref 0–0.5)
LDLC SERPL CALC-MCNC: 56 MG/DL (ref 0–100)
LDLC/HDLC SERPL: 1.48 {RATIO}
LYMPHOCYTES # BLD AUTO: 1.13 10*3/MM3 (ref 0.7–3.1)
LYMPHOCYTES NFR BLD AUTO: 20 % (ref 19.6–45.3)
MAGNESIUM SERPL-MCNC: 2 MG/DL (ref 1.6–2.4)
MCH RBC QN AUTO: 26.7 PG (ref 26.6–33)
MCHC RBC AUTO-ENTMCNC: 32 G/DL (ref 31.5–35.7)
MCV RBC AUTO: 83.6 FL (ref 79–97)
MONOCYTES # BLD AUTO: 0.51 10*3/MM3 (ref 0.1–0.9)
MONOCYTES NFR BLD AUTO: 9 % (ref 5–12)
NEUTROPHILS NFR BLD AUTO: 3.84 10*3/MM3 (ref 1.7–7)
NEUTROPHILS NFR BLD AUTO: 68.1 % (ref 42.7–76)
NRBC BLD AUTO-RTO: 0 /100 WBC (ref 0–0.2)
PLATELET # BLD AUTO: 164 10*3/MM3 (ref 140–450)
PMV BLD AUTO: 11.6 FL (ref 6–12)
POTASSIUM SERPL-SCNC: 3.8 MMOL/L (ref 3.5–5.2)
PROT SERPL-MCNC: 6.7 G/DL (ref 6–8.5)
RBC # BLD AUTO: 5.01 10*6/MM3 (ref 3.77–5.28)
SODIUM SERPL-SCNC: 141 MMOL/L (ref 136–145)
TRIGL SERPL-MCNC: 102 MG/DL (ref 0–150)
TSH SERPL DL<=0.05 MIU/L-ACNC: 1.65 UIU/ML (ref 0.27–4.2)
URATE SERPL-MCNC: 5 MG/DL (ref 2.4–5.7)
VLDLC SERPL-MCNC: 19 MG/DL (ref 5–40)
WBC # BLD AUTO: 5.64 10*3/MM3 (ref 3.4–10.8)

## 2020-12-09 PROCEDURE — 80061 LIPID PANEL: CPT | Performed by: INTERNAL MEDICINE

## 2020-12-09 PROCEDURE — 93000 ELECTROCARDIOGRAM COMPLETE: CPT | Performed by: INTERNAL MEDICINE

## 2020-12-09 PROCEDURE — 99214 OFFICE O/P EST MOD 30 MIN: CPT | Performed by: INTERNAL MEDICINE

## 2020-12-09 PROCEDURE — 84550 ASSAY OF BLOOD/URIC ACID: CPT | Performed by: INTERNAL MEDICINE

## 2020-12-09 PROCEDURE — 80053 COMPREHEN METABOLIC PANEL: CPT | Performed by: INTERNAL MEDICINE

## 2020-12-09 PROCEDURE — 85025 COMPLETE CBC W/AUTO DIFF WBC: CPT | Performed by: INTERNAL MEDICINE

## 2020-12-09 PROCEDURE — 83735 ASSAY OF MAGNESIUM: CPT | Performed by: INTERNAL MEDICINE

## 2020-12-09 PROCEDURE — 36415 COLL VENOUS BLD VENIPUNCTURE: CPT

## 2020-12-09 PROCEDURE — 84443 ASSAY THYROID STIM HORMONE: CPT | Performed by: INTERNAL MEDICINE

## 2020-12-09 NOTE — PROGRESS NOTES
Date of Office Visit: 2020  Encounter Provider: Anastasiia Callahan MD  Place of Service: [facility] CARDIOLOGY  Patient Name: Julee King  :1945      Patient ID:  Julee King is a 75 y.o. female is here for  followup for         History of Present Illness       She is followed for CAD, s/p CABG.      She came in with midsternal chest pain 2014 to CPU. She had known history  of hypertension, hyperlipidemia, and had had a history of mild obstructive sleep apnea,  but was not using a CPAP machine. She was exercising at the [facility] 3 times a week and when  she would get on the treadmill she would notice this chest tightness. When we saw her in  the chest pain unit her blood pressure was a bit elevated. We ordered a couple of studies.  She had a 2-D echocardiogram with Doppler done 2014 showing ejection fraction of 65%  with grade I diastolic dysfunction and mild left ventricular hypertrophy. She also had a  PET stress study, which was done 2014, which showed no evidence of ischemia. We then  made a referral to the [facility] at [facility]. She was found  to have obstructive sleep apnea, which was moderate to severe, with recommendation of a  CPAP, which she is currently using. She says she is having some difficulty tolerating it,  but she is going to try to stick with it.       She then presented to the hospital 2016 with chest pain and pressure. She was severely anemic and found to have gastrointestinal bleeding. She did receive an transfusions. Her troponin was elevated at 4. Her echocardiogram showed left ventricular ejection fraction of 33% with mild to moderate mitral insufficiency. She went on to have a cardiac catheterization done which showed 80% distal left main stenosis, 60% distal first obtuse marginal stenosis, 30% ramus mid vessel stenosis, 30% proximal LAD stenosis, 100% occluded first diagonal branch, 70%  Patient's Dad calling regarding:  scheduled his daughter for the injection of Stamaril/yellow fever.  informed the patient's Dad there is a list of questions that are required to be asked for this type of appointment and travel that was scheduled. Patient said he understands and said \"I have no time now I have a meeting to go to\".     Patient's Dad ended call before  could ask the questions regarding Stamaril/yellow travel, per protocol.    Patient's Dad said that he will call as soon as he is done with a meeting.     Patient's Dad was made aware of the costs involved during the call.     Patient's Dad ended call and said \"I will call back\".           mid LAD stenosis, 100% proximal RCA stenosis and 95% distal RCA stenosis. She went on to have coronary bypass grafting 1/22/2016. She received LIMA to LAD, SVG to obtuse marginal 1, SVG to obtuse marginal 2 and SVG to LV branch. She also had endoscopy Dr. Senior done 1/24/2016 showing small hiatal hernia, normal stomach and duodenal.       Her carotid study done 1/25/2016 shows 16-49% stenosis of left internal carotid artery. The right internal carotid artery is patent.  She had a normal carotid duplex done January 2017.       She sees Dr. Galaviz for LOVE. She has a colonoscopy 6/2016 with Dr. Rivera and it was good.      Labs done 1/2019 show HDL 40, LDL 75, normal CMP and CBC.     She was evaluated in January 2019 and reported worsening dyspnea upon exertion for 6 weeks.  Her d-dimer was mildly elevated.  She also noted some occasional mild chest heaviness.  she had a VQ scan completed (unable to do CTA of chest because of allergy to iodine) which was negative for pulmonary emboli.  She had a 2D echocardiogram completed 1/31/19 which revealed an EF of 58%, mild LVH, and trace valvular regurgitation.  Cardiac PET scan 1/31/19 was abnormal revealing a medium size, mildly severe area of ischemia in the anterior and inferior lateral wall.       Catheterization in 2/11/2019 showed 80% distal left main, 50% proximal LAD with 100% mid LAD stenosis, 30% proximal circumflex with 50% mid circumflex, patent high large first obtuse marginal branch, 100% occluded RCA, patent LIMA to LAD with 0 distal percent LAD stenosis, patent SVG to OM 1, occluded SVG to OM 2 and occluded SVG to RCA.  Dr. Almeida recommended medical management and if this fails to consider PCI of the distal left main to help with perfusion of the distal left circumflex system.    She has chronic dyspnea on exertion which is unchanged.  She has had no fevers, chills or cough.  She is fairly sedentary because of her dyspnea.  She has no chest pain or pressure.   She has no tachycardia or dizziness.  Her energy level is stable but somewhat low.  She is taking her medications as directed without difficulty.  She has had no nausea or vomiting.    Past Medical History:   Diagnosis Date   • Bronchitis 05/2016   • Cardiomyopathy (CMS/[facility])    • Carotid artery disease (CMS/Formerly [facility])     16-49% stenosis of the left internal carotid 2016; carotid duplex 1/2017 - normal   • Colon polyps    • Coronary artery disease 01/2016    Non-STEMI and status post CABG   • Dyslipidemia    • GI bleed     Severely anemic and received a blood transfusion   • Hiatal hernia    • Hyperlipidemia    • Hypertension    • Hypocalcemia    • Hypokalemia    • Non-STEMI (non-ST elevated myocardial infarction) (CMS/Formerly [facility]) 01/2016   • Obesity    • LOVE (obstructive sleep apnea)     moderate to severe; compliant with CPAP machine   • PVC (premature ventricular contraction)          Past Surgical History:   Procedure Laterality Date   • CARDIAC CATHETERIZATION N/A 2/11/2019    Procedure: Coronary angiography;  Surgeon: Cheryl Almeida MD;  Location: [facility] CATH INVASIVE LOCATION;  Service: Cardiovascular   • CARDIAC CATHETERIZATION N/A 2/11/2019    Procedure: Left Heart Cath;  Surgeon: Cheryl Almeida MD;  Location[facility] CATH INVASIVE LOC[facility];  Service: Cardiovascular   • CARDIAC CATHETERIZATION N/A 2/11/2019    Procedure: Left ventriculography;  Surgeon: Cheryl Almeida MD;  [facility] CATH [facility]VASIVE [facility];  Service: Cardiovascular   • CARDIAC CATHETERIZATION  2/11/2019    Procedure: Saphenous Vein Graft;  Surgeon: Cheryl Almeida MD;  [facility] CATH [facility];  Service: Cardiovascular   • CARDIAC CATHETERIZATION N/A 2/11/2019    Procedure: Native mammary injection;  Surgeon: Cheryl Almeida MD;  Location: [facility] CATH INVASIVE LOCATION;  Service: Cardiovascular   • CHOLECYSTECTOMY     • COLONOSCOPY N/A 6/8/2016    Procedure: COLONOSCOPY to cecum with cold biopsy polypectomies;  Surgeon:  Alvarado Rivera MD;  Location: [facility] ENDOSCOPY;  Service:    • CORONARY ARTERY BYPASS GRAFT  02/28/2016    CABG x4  Dr Monroy;  CHOI to LAD, SVG to obtuse marginal 1, SVG to obtuse marginal 2, and SVG to LV branch   • ENDOSCOPY N/A 6/8/2016    Procedure: ESOPHAGOGASTRODUODENOSCOPY with biopsy;  Surgeon: Alvarado Rivera MD;  Location: [facility] ENDOSCOPY;  Service:    • ENTEROSCOPY SMALL BOWEL N/A 8/2/2017    Procedure: ENTEROSCOPY SMALL BOWEL WITH COLD BIOPSIES AND ARGON PLASMA COAGULATION TO  GASTIC ULCERS;  Surgeon: Alvarado Rivera MD;  Location: [facility] ENDOSCOPY;  Service:    • HERNIA REPAIR     • JOINT REPLACEMENT      vijaya knees   • OTHER SURGICAL HISTORY      KNEE REPLACEMENT   • OTHER SURGICAL HISTORY      TREATMENT OF ANKLE FRACTURE   • OTHER SURGICAL HISTORY      TREATMENT FRACTURE OF THE HAND       Current Outpatient Medications on File Prior to Visit   Medication Sig Dispense Refill   • aspirin 81 MG tablet Take 1 tablet by mouth daily.     • atorvastatin (LIPITOR) 40 MG tablet TAKE 1 TABLET DAILY 90 tablet 1   • carvedilol (COREG) 25 MG tablet TAKE 1 TABLET TWICE A  tablet 4   • losartan (COZAAR) 100 MG tablet Take 1 tablet by mouth Every Night. 90 tablet 0   • NON FORMULARY C PAP     • [DISCONTINUED] benzonatate (TESSALON PERLES) 100 MG capsule Take 1 capsule by mouth 3 (Three) Times a Day As Needed for Cough. 21 capsule 0   • [DISCONTINUED] fluticasone (FLONASE) 50 MCG/ACT nasal spray 2 sprays into the nostril(s) as directed by provider Daily. 1 bottle 3     No current facility-administered medications on file prior to visit.        Social History     Socioeconomic History   • Marital status:      Spouse name: Not on file   • Number of children: Not on file   • Years of education: Not on file   • Highest education level: Not on file   Tobacco Use   • Smoking status: Never Smoker   • Smokeless tobacco: Never Used   Substance and Sexual Activity   • Alcohol use: Yes     Comment: Very  "Rare//caffeine use: 1 coke daily   • Drug use: No     Comment: CAFFEINE USE    • Sexual activity: Defer           Review of Systems   Constitution: Negative.   HENT: Negative for congestion.    Eyes: Negative for vision loss in left eye and vision loss in right eye.   Respiratory: Negative.  Negative for cough, hemoptysis, shortness of breath, sleep disturbances due to breathing, snoring, sputum production and wheezing.    Endocrine: Negative.    Hematologic/Lymphatic: Negative.    Skin: Negative for poor wound healing and rash.   Musculoskeletal: Negative for falls, gout, muscle cramps and myalgias.   Gastrointestinal: Negative for abdominal pain, diarrhea, dysphagia, hematemesis, melena, nausea and vomiting.   Neurological: Negative for excessive daytime sleepiness, dizziness, headaches, light-headedness, loss of balance, seizures and vertigo.   Psychiatric/Behavioral: Negative for depression and substance abuse. The patient is not nervous/anxious.        Procedures    ECG 12 Lead    Date/Time: 12/9/2020 10:05 AM  Performed by: Anastasiia Callahan MD  Authorized by: Anastasiia Callahan MD   Comparison: compared with previous ECG   Similar to previous ECG  Rhythm: sinus rhythm  T inversion: V2, V3, V4 and V5    Clinical impression: abnormal EKG                Objective:      Vitals:    12/09/20 0936   BP: 132/70   BP Location: Left arm   Pulse: 60   Weight: 123 kg (270 lb 3.2 oz)   Height: 154.9 cm (61\")     Body mass index is 51.05 kg/m².    Vitals signs reviewed.   Constitutional:       General: Not in acute distress.     Appearance: Well-developed. Not diaphoretic.   Eyes:      General: No scleral icterus.     Conjunctiva/sclera: Conjunctivae normal.   HENT:      Head: Normocephalic and atraumatic.   Neck:      Musculoskeletal: Neck supple.      Thyroid: No thyromegaly.      Vascular: No carotid bruit or JVD.      Lymphadenopathy: No cervical adenopathy.   Pulmonary:      Effort: Pulmonary effort is normal. " No respiratory distress.      Breath sounds: Normal breath sounds. No wheezing. No rhonchi. No rales.   Chest:      Chest wall: Not tender to palpatation.   Cardiovascular:      Normal rate. Regular rhythm.      Murmurs: There is no murmur.      No gallop.   Pulses:     Intact distal pulses.   Edema:     Peripheral edema absent.   Abdominal:      General: Bowel sounds are normal. There is no distension or abdominal bruit.      Palpations: Abdomen is soft. There is no abdominal mass.      Tenderness: There is no abdominal tenderness.   Musculoskeletal:         General: No deformity.      Extremities: No clubbing present.  Skin:     General: Skin is warm and dry. There is no cyanosis.      Coloration: Skin is not pale.      Findings: No rash.   Neurological:      Mental Status: Alert and oriented to person, place, and time.      Cranial Nerves: No cranial nerve deficit.   Psychiatric:         Judgment: Judgment normal.         Lab Review:       Assessment:      Diagnosis Plan   1. Coronary artery disease involving native coronary artery of native heart without angina pectoris  ECG 12 Lead    Comprehensive Metabolic Panel    Lipid Panel    Magnesium    CBC & Differential    TSH    Uric Acid   2. Essential hypertension  Comprehensive Metabolic Panel    Lipid Panel    Magnesium    CBC & Differential    TSH    Uric Acid   3. Obstructive apnea  ECG 12 Lead    Comprehensive Metabolic Panel    Lipid Panel    Magnesium    CBC & Differential    TSH    Uric Acid   4. Class 3 severe obesity due to excess calories with serious comorbidity and body mass index (BMI) of 50.0 to 59.9 in adult (CMS/HCC)     5. Dyslipidemia       1.                  Coronary artery bypass grafting in 01/2016.  cath done 2/2019 showed SVG to OM2 and SvG to RCA occluded.  Treated medically but if failure then consider LM intervention to help with left circumflex perfusion. Has stable angina, no changes.    2.                   Gastrointestinal bleeding  in 01/2016.    3.                   Hypertension.  controlled.   4.                   Hyperlipidemia.    5.                   Obstructive sleep apnea.  on CPAP.   6.                   Right internal carotid artery stenosis, mild, in 01/2016. Normal carotid duplex 1/2017.   7.  Chronic exertional dyspnea, unchanged.     Plan:       See Anabel in 1 year, no medication changes, get laboratories done today.  Overall stable.

## 2020-12-10 NOTE — TELEPHONE ENCOUNTER
Attempted to call patient with results.  No answer and no voicemail.  Will continue to try and reach the patient.    Radha Martin RN  Triage [facility]

## 2020-12-11 NOTE — TELEPHONE ENCOUNTER
Results and recommendations reviewed with the patient. Patient verbalized understanding. Denied further questions at this time.    Radha Martin RN  Triage [facility]

## 2020-12-23 RX ORDER — ATORVASTATIN CALCIUM 40 MG/1
TABLET, FILM COATED ORAL
Qty: 90 TABLET | Refills: 3 | Status: SHIPPED | OUTPATIENT
Start: 2020-12-23 | End: 2021-11-29

## 2021-03-09 DIAGNOSIS — Z23 IMMUNIZATION DUE: ICD-10-CM

## 2021-03-09 RX ORDER — CARVEDILOL 25 MG/1
TABLET ORAL
Qty: 180 TABLET | Refills: 3 | Status: SHIPPED | OUTPATIENT
Start: 2021-03-09 | End: 2022-01-19 | Stop reason: SDUPTHER

## 2021-05-10 RX ORDER — LOSARTAN POTASSIUM 100 MG/1
TABLET ORAL
Qty: 90 TABLET | Refills: 2 | Status: SHIPPED | OUTPATIENT
Start: 2021-05-10 | End: 2021-11-22

## 2021-11-23 RX ORDER — LOSARTAN POTASSIUM 100 MG/1
TABLET ORAL
Qty: 90 TABLET | Refills: 0 | Status: SHIPPED | OUTPATIENT
Start: 2021-11-23 | End: 2022-01-19 | Stop reason: SDUPTHER

## 2021-11-29 RX ORDER — ATORVASTATIN CALCIUM 40 MG/1
TABLET, FILM COATED ORAL
Qty: 90 TABLET | Refills: 3 | Status: SHIPPED | OUTPATIENT
Start: 2021-11-29 | End: 2022-11-22

## 2022-01-17 ENCOUNTER — OFFICE VISIT (OUTPATIENT)
Dept: CARDIOLOGY | Facility: CLINIC | Age: 77
End: 2022-01-17

## 2022-01-17 ENCOUNTER — LAB (OUTPATIENT)
Dept: LAB | Facility: HOSPITAL | Age: 77
End: 2022-01-17

## 2022-01-17 VITALS
BODY MASS INDEX: 49.84 KG/M2 | DIASTOLIC BLOOD PRESSURE: 76 MMHG | HEIGHT: 61 IN | HEART RATE: 68 BPM | WEIGHT: 264 LBS | SYSTOLIC BLOOD PRESSURE: 122 MMHG

## 2022-01-17 DIAGNOSIS — I25.10 CORONARY ARTERY DISEASE INVOLVING NATIVE CORONARY ARTERY OF NATIVE HEART WITHOUT ANGINA PECTORIS: ICD-10-CM

## 2022-01-17 DIAGNOSIS — E78.5 DYSLIPIDEMIA: ICD-10-CM

## 2022-01-17 DIAGNOSIS — I25.5 ISCHEMIC CARDIOMYOPATHY: ICD-10-CM

## 2022-01-17 DIAGNOSIS — G47.33 OBSTRUCTIVE APNEA: ICD-10-CM

## 2022-01-17 DIAGNOSIS — R06.02 SHORTNESS OF BREATH: ICD-10-CM

## 2022-01-17 DIAGNOSIS — R94.31 ABNORMAL EKG: ICD-10-CM

## 2022-01-17 DIAGNOSIS — E66.01 CLASS 3 SEVERE OBESITY DUE TO EXCESS CALORIES WITH SERIOUS COMORBIDITY AND BODY MASS INDEX (BMI) OF 45.0 TO 49.9 IN ADULT: ICD-10-CM

## 2022-01-17 DIAGNOSIS — I10 PRIMARY HYPERTENSION: ICD-10-CM

## 2022-01-17 DIAGNOSIS — I25.10 CORONARY ARTERY DISEASE INVOLVING NATIVE CORONARY ARTERY OF NATIVE HEART WITHOUT ANGINA PECTORIS: Primary | ICD-10-CM

## 2022-01-17 LAB
ALBUMIN SERPL-MCNC: 3.7 G/DL (ref 3.5–5.2)
ALBUMIN/GLOB SERPL: 1.1 G/DL
ALP SERPL-CCNC: 126 U/L (ref 39–117)
ALT SERPL W P-5'-P-CCNC: 17 U/L (ref 1–33)
ANION GAP SERPL CALCULATED.3IONS-SCNC: 9.2 MMOL/L (ref 5–15)
AST SERPL-CCNC: 18 U/L (ref 1–32)
BILIRUB SERPL-MCNC: 0.8 MG/DL (ref 0–1.2)
BUN SERPL-MCNC: 10 MG/DL (ref 8–23)
BUN/CREAT SERPL: 13 (ref 7–25)
CALCIUM SPEC-SCNC: 8.9 MG/DL (ref 8.6–10.5)
CHLORIDE SERPL-SCNC: 109 MMOL/L (ref 98–107)
CHOLEST SERPL-MCNC: 122 MG/DL (ref 0–200)
CO2 SERPL-SCNC: 25.8 MMOL/L (ref 22–29)
CREAT SERPL-MCNC: 0.77 MG/DL (ref 0.57–1)
DEPRECATED RDW RBC AUTO: 41.4 FL (ref 37–54)
ERYTHROCYTE [DISTWIDTH] IN BLOOD BY AUTOMATED COUNT: 13.5 % (ref 12.3–15.4)
GFR SERPL CREATININE-BSD FRML MDRD: 73 ML/MIN/1.73
GLOBULIN UR ELPH-MCNC: 3.3 GM/DL
GLUCOSE SERPL-MCNC: 116 MG/DL (ref 65–99)
HCT VFR BLD AUTO: 42 % (ref 34–46.6)
HDLC SERPL-MCNC: 35 MG/DL (ref 40–60)
HGB BLD-MCNC: 13.4 G/DL (ref 12–15.9)
LDLC SERPL CALC-MCNC: 64 MG/DL (ref 0–100)
LDLC/HDLC SERPL: 1.76 {RATIO}
MCH RBC QN AUTO: 26.9 PG (ref 26.6–33)
MCHC RBC AUTO-ENTMCNC: 31.9 G/DL (ref 31.5–35.7)
MCV RBC AUTO: 84.3 FL (ref 79–97)
PLATELET # BLD AUTO: 179 10*3/MM3 (ref 140–450)
PMV BLD AUTO: 11.8 FL (ref 6–12)
POTASSIUM SERPL-SCNC: 4.1 MMOL/L (ref 3.5–5.2)
PROT SERPL-MCNC: 7 G/DL (ref 6–8.5)
RBC # BLD AUTO: 4.98 10*6/MM3 (ref 3.77–5.28)
SODIUM SERPL-SCNC: 144 MMOL/L (ref 136–145)
TRIGL SERPL-MCNC: 127 MG/DL (ref 0–150)
VLDLC SERPL-MCNC: 23 MG/DL (ref 5–40)
WBC NRBC COR # BLD: 5.41 10*3/MM3 (ref 3.4–10.8)

## 2022-01-17 PROCEDURE — 85027 COMPLETE CBC AUTOMATED: CPT

## 2022-01-17 PROCEDURE — 99214 OFFICE O/P EST MOD 30 MIN: CPT | Performed by: NURSE PRACTITIONER

## 2022-01-17 PROCEDURE — 80061 LIPID PANEL: CPT

## 2022-01-17 PROCEDURE — 80053 COMPREHEN METABOLIC PANEL: CPT

## 2022-01-17 PROCEDURE — 36415 COLL VENOUS BLD VENIPUNCTURE: CPT

## 2022-01-17 PROCEDURE — 93000 ELECTROCARDIOGRAM COMPLETE: CPT | Performed by: NURSE PRACTITIONER

## 2022-01-17 NOTE — PROGRESS NOTES
Date of Office Visit: 2022  Encounter Provider: MANDIE Silverio  Place of Service: [facility] CARDIOLOGY  Patient Name: Julee King  :1945   Primary Cardiologist: Dr. Anastasiia Callahan    Chief Complaint   Patient presents with   • Coronary Artery Disease   • Follow-up   :     HPI: Julee King is a 76 y.o. female who presents today for follow-up on coronary artery disease and ischemic cardiomyopathy.  I have reviewed her medical records.    She has been diagnosed with hypertension, hyperlipidemia, carotid artery disease, obstructive sleep apnea, and obesity. In 2016, she was diagnosed with coronary artery disease, left ventricular dysfunction with an EF of 33%, and underwent coronary artery bypass graft surgery.  She was severely anemic from a GI bleed at that time as well.  Her ejection fraction then normalized.     In 2019, she presented to the ED with dyspnea and chest heaviness.  D-dimer was elevated and VQ scan was negative for pulmonary emboli.  Echocardiogram showed normal LVEF of 58% and mild LVH.  Cardiac PET scan was abnormal.  She underwent cardiac catheterization which revealed multivessel coronary artery disease with 2 out of 4 patent grafts with medical treatment recommended.  Dr. Almeida said if medical treatment fails we could consider PCI of the distal left main to help with perfusion of the distal left circumflex.    In 2020, she saw Dr. Callahan's in the office.  She reported chronic dyspnea which was unchanged.    She presents today for follow-up visit.  She continues to have chronic dyspnea and bilateral lower extremity edema edema (left greater than right).  She denies chest pain, palpitations, dizziness, syncope, or bleeding.  Her blood pressure and heart rate are normal today.  She is due for annual blood work.  She has known obstructive sleep apnea and is compliant with her CPAP machine.      Past Medical  History:   Diagnosis Date   • Bronchitis 05/2016   • Cardiomyopathy (HCC)    • Carotid artery disease (HCC)     16-49% stenosis of the left internal carotid 2016; carotid duplex 1/2017 - normal   • Colon polyps    • Coronary artery disease 01/2016    Non-STEMI and status post CABG   • Dyslipidemia    • GI bleed     Severely anemic and received a blood transfusion   • Hiatal hernia    • Hyperlipidemia    • Hypertension    • Hypocalcemia    • Hypokalemia    • Non-STEMI (non-ST elevated myocardial infarction) ([facility]) 01/2016   • Obesity    • LOVE (obstructive sleep apnea)     moderate to severe; compliant with CPAP machine   • PVC (premature ventricular contraction)        Past Surgical History:   Procedure Laterality Date   • CARDIAC CATHETERIZATION N/A 2/11/2019    Procedure: Coronary angiography;  Surgeon: Cheryl Almeida MD;  Location: [facility] CATH INVASIVE LOCATION;  Service: Cardiovascular   • CHOLECYSTECTOMY     • COLONOSCOPY N/A 6/8/2016    Procedure: COLONOSCOPY to cecum with cold biopsy polypectomies;  Surgeon: Alvarado Rivera MD;  Location: [facility] ENDOSCOPY;  Service:    • CORONARY ARTERY BYPASS GRAFT  02/28/2016    CABG x4  Dr Monroy;  CHOI to LAD, SVG to obtuse marginal 1, SVG to obtuse marginal 2, and SVG to LV branch   • ENDOSCOPY N/A 6/8/2016    Procedure: ESOPHAGOGASTRODUODENOSCOPY with biopsy;  Surgeon: Alvarado Rivera MD;  Location: [facility] ENDOSCOPY;  Service:    • ENTEROSCOPY SMALL BOWEL N/A 8/2/2017    Procedure: ENTEROSCOPY SMALL BOWEL WITH COLD BIOPSIES AND ARGON PLASMA COAGULATION TO  GASTIC ULCERS;  Surgeon: Alvarado Rivera MD;  Location: [facility] ENDOSCOPY;  Service:    • HERNIA REPAIR     • JOINT REPLACEMENT      vijaya knees   • OTHER SURGICAL HISTORY      KNEE REPLACEMENT   • OTHER SURGICAL HISTORY      TREATMENT OF ANKLE FRACTURE   • OTHER SURGICAL HISTORY      TREATMENT FRACTURE OF THE HAND       Social History     Socioeconomic History   • Marital status:    Tobacco Use   • Smoking status:  Never Smoker   • Smokeless tobacco: Never Used   Substance and Sexual Activity   • Alcohol use: Yes     Alcohol/week: 2.0 standard drinks     Types: 2 Glasses of wine per week     Comment: WINE MAYBE EVERY COUPLE OF MONTHS   • Drug use: Never   • Sexual activity: Not Currently     Partners: Male     Birth control/protection: None       Family History   Problem Relation Age of Onset   • Coronary artery disease Mother    • Other Father    • Coronary artery disease Father    • Stroke Sister    • Heart attack Sister    • Heart attack Brother        Review of Systems   Constitutional: Negative for chills, diaphoresis, fever, night sweats, weight gain and weight loss.   HENT: Negative for hearing loss, nosebleeds, sore throat and tinnitus.    Eyes: Negative for blurred vision, double vision, pain and visual disturbance.   Cardiovascular: Positive for dyspnea on exertion and leg swelling. Negative for chest pain, claudication, cyanosis, irregular heartbeat, near-syncope, orthopnea, palpitations, paroxysmal nocturnal dyspnea and syncope.   Respiratory: Positive for cough and shortness of breath. Negative for hemoptysis, snoring and wheezing.    Endocrine: Negative for cold intolerance, heat intolerance and polyuria.   Hematologic/Lymphatic: Negative for bleeding problem. Does not bruise/bleed easily.   Skin: Negative for color change, dry skin, flushing and itching.   Musculoskeletal: Negative for falls, joint pain, joint swelling, muscle cramps, muscle weakness and myalgias.   Gastrointestinal: Negative for abdominal pain, constipation, heartburn, melena, nausea and vomiting.   Genitourinary: Negative for dysuria and hematuria.   Neurological: Positive for headaches and light-headedness. Negative for excessive daytime sleepiness, dizziness, loss of balance, numbness, paresthesias, seizures and vertigo.   Psychiatric/Behavioral: Negative for altered mental status, depression, memory loss and substance abuse. The patient  "does not have insomnia and is not nervous/anxious.    Allergic/Immunologic: Negative for environmental allergies.       Allergies   Allergen Reactions   • Codeine      \"makes me loopy\"   • Iodinated Diagnostic Agents      itching   • Sulfa Antibiotics Unknown (See Comments)     NOT SURE         Current Outpatient Medications:   •  aspirin 81 MG tablet, Take 1 tablet by mouth daily., Disp: , Rfl:   •  atorvastatin (LIPITOR) 40 MG tablet, TAKE 1 TABLET DAILY, Disp: 90 tablet, Rfl: 3  •  carvedilol (COREG) 25 MG tablet, TAKE 1 TABLET TWICE A DAY, Disp: 180 tablet, Rfl: 3  •  losartan (COZAAR) 100 MG tablet, TAKE 1 TABLET EVERY NIGHT, Disp: 90 tablet, Rfl: 0  •  NON FORMULARY, C PAP, Disp: , Rfl:       Objective:     Vitals:    01/17/22 1229 01/17/22 1244   BP: 130/78 122/76   BP Location: Left arm Right arm   Pulse: 68    Weight: 120 kg (264 lb)    Height: 154.9 cm (61\")      Body mass index is 49.88 kg/m².    PHYSICAL EXAM:    Vitals Reviewed.   General Appearance: No acute distress, well developed and well nourished. Obese.   Eyes: Conjunctiva and lids: No erythema, swelling, or discharge. Sclera non-icteric.   HENT: Atraumatic, normocephalic. External eyes, ears, and nose normal. No hearing loss noted. Mucous membranes normal. Lips not cyanotic. Neck supple with no tenderness.  Wearing a mask.  Respiratory: No signs of respiratory distress. Respiration rhythm and depth normal.   Clear to auscultation. No rales, crackles, rhonchi, or wheezing auscultated.   Cardiovascular:  Jugular Venous Pressure: Normal  Heart Rate and Rhythm: Normal, Heart Sounds: Normal S1 and S2. No S3 or S4 noted.  Murmurs: No murmurs noted. No rubs, thrills, or gallops.   Lower Extremities: Bilateral trace lower extremity edema noted; left greater than right.  Gastrointestinal:  Abdomen soft, non-distended, non-tender. Normal bowel sounds. No hepatomegaly.   Musculoskeletal: Normal movement of extremities  Skin and Nails: General appearance " normal. No pallor, cyanosis, diaphoresis. Skin temperature normal. No clubbing of fingernails.   Psychiatric: Patient alert and oriented to person, place, and time. Speech and behavior appropriate. Normal mood and affect.       ECG 12 Lead    Date/Time: 1/17/2022 12:48 PM  Performed by: Anabel Wood APRN  Authorized by: Anabel Wood APRN   Comparison: compared with previous ECG from 12/9/2020  Similar to previous ECG  Rhythm: sinus rhythm  Rate: normal  BPM: 63  Conduction: conduction normal  T inversion: V1, V2, V3, V4 and V5  QRS axis: normal  Other findings: non-specific ST-T wave changes    Clinical impression: abnormal EKG  Comments: T waves more pronounced              Assessment:       Diagnosis Plan   1. Coronary artery disease involving native coronary artery of native heart without angina pectoris  CBC (No Diff)    Comprehensive Metabolic Panel    Lipid Panel   2. Abnormal EKG     3. Ischemic cardiomyopathy     4. Shortness of breath     5. Primary hypertension  CBC (No Diff)    Comprehensive Metabolic Panel    Lipid Panel   6. Dyslipidemia  CBC (No Diff)    Comprehensive Metabolic Panel    Lipid Panel   7. Class 3 severe obesity due to excess calories with serious comorbidity and body mass index (BMI) of 45.0 to 49.9 in adult (HCC)     8. Obstructive apnea            Plan:       1/2.  Coronary Artery Disease: Status post CABG January 2016.  In January 2019, she was noted to have 2 out of 4 patent bypass grafts.  If her angina persisted, Dr. Almeida said she could proceed with intervention of the left main to left circumflex.  Her EKG tracing today shows more pronounced T wave inversions.  She has chronic dyspnea, but denies chest pain.  I will discuss with Dr. Callahan.  Continue aspirin and atorvastatin.    3. Ischemic Cardiomyopathy: EF normalized to 58 % per last echocardiogram.      4.  Shortness of Breath: Multifactorial, chronic and unchanged.    5.  Hypertension: Blood pressure is well  controlled on current medication regimen.    6.  Hyperlipidemia: She remains on atorvastatin.    7.  Obesity: BMI is 49.9.  She would benefit from exercise, weight loss, and heart healthy diet.    8.  Obstructive Sleep Apnea: Compliant with CPAP machine.    9.  Further recommendations to follow after my discussion with Dr. Callahan.  She is due for her routine blood work at the main lab at the hospital.    ADDENDUM:  · I discussed the patient's plan of care with Dr. Callahan.  Due to the abnormal EKG, she recommended a 2D echocardiogram and cardiac PET scan.  Patient informed.    ADDENDUM 2/9/2022:    · Echocardiogram showed normal LVEF, moderate LVH, grade 1 diastolic dysfunction, and no wall motion abnormalities.    · Cardiac PET scan showed small to medium size, mildly severe area of ischemia in the inferior wall.  Dr. Callahan reviewed with Dr. Almeida who did her last heart catheterization and they recommended medical treatment.  · She continues to have shortness of breath and lower extremity edema.  I recommended a trial of furosemide 40 mg 1 tablet daily.  She has previously been on HCTZ, but it caused her nausea.  I would like her to come back and see me in 2 weeks and repeat BMP at that time.       As always, it has been a pleasure to participate in your patient's care.      Sincerely,         MANDIE Rodriguez      · Dictated using Dragon  · COVID-19 Precautions - Patient was compliant in wearing a mask. When I saw the patient, I used appropriate personal protective equipment (PPE) including mask and eye shield (standard procedure).  Additionally, I used gown and gloves if indicated.  Hand hygiene was completed before and after seeing the patient.  I spent 30 minutes reviewing her medical records/testing/previous office notes/labs, face-to-face interaction with patient, physical examination, formulating the plan of care, and discussion of plan of care with patient.

## 2022-01-18 ENCOUNTER — TELEPHONE (OUTPATIENT)
Dept: CARDIOLOGY | Facility: CLINIC | Age: 77
End: 2022-01-18

## 2022-01-18 NOTE — TELEPHONE ENCOUNTER
I discussed with Dr. Callahan. We recommended a cardiac PET scan and echocardiogram.     Scheduling - please call to arrange cardiac PET scan and 2D echocardiogram.  Thank you.

## 2022-01-19 RX ORDER — CARVEDILOL 25 MG/1
25 TABLET ORAL 2 TIMES DAILY
Qty: 180 TABLET | Refills: 3 | Status: SHIPPED | OUTPATIENT
Start: 2022-01-19 | End: 2023-02-02

## 2022-01-19 RX ORDER — LOSARTAN POTASSIUM 100 MG/1
100 TABLET ORAL NIGHTLY
Qty: 90 TABLET | Refills: 3 | Status: SHIPPED | OUTPATIENT
Start: 2022-01-19 | End: 2023-01-24

## 2022-02-07 ENCOUNTER — HOSPITAL ENCOUNTER (OUTPATIENT)
Dept: CARDIOLOGY | Facility: HOSPITAL | Age: 77
Discharge: HOME | End: 2022-02-07

## 2022-02-07 DIAGNOSIS — I25.5 ISCHEMIC CARDIOMYOPATHY: ICD-10-CM

## 2022-02-07 DIAGNOSIS — R06.02 SHORTNESS OF BREATH: ICD-10-CM

## 2022-02-07 DIAGNOSIS — I25.10 CORONARY ARTERY DISEASE INVOLVING NATIVE CORONARY ARTERY OF NATIVE HEART WITHOUT ANGINA PECTORIS: ICD-10-CM

## 2022-02-07 DIAGNOSIS — R94.31 ABNORMAL EKG: ICD-10-CM

## 2022-02-07 LAB
BH CV REST NUCLEAR ISOTOPE DOSE: 60 MCI
BH CV STRESS BP STAGE 1: NORMAL
BH CV STRESS COMMENTS STAGE 1: NORMAL
BH CV STRESS DOSE REGADENOSON STAGE 1: 0.4
BH CV STRESS DURATION MIN STAGE 1: 0
BH CV STRESS DURATION SEC STAGE 1: 10
BH CV STRESS HR STAGE 1: 75
BH CV STRESS NUCLEAR ISOTOPE DOSE: 60 MCI
BH CV STRESS PROTOCOL 1: NORMAL
BH CV STRESS RECOVERY BP: NORMAL MMHG
BH CV STRESS RECOVERY HR: 57 BPM
BH CV STRESS STAGE 1: 1
LV EF NUC BP: 60 %
MAXIMAL PREDICTED HEART RATE: 145 BPM
PERCENT MAX PREDICTED HR: 51.72 %
STRESS BASELINE BP: NORMAL MMHG
STRESS BASELINE HR: 53 BPM
STRESS PERCENT HR: 61 %
STRESS POST EXERCISE DUR SEC: 10 SEC
STRESS POST PEAK BP: NORMAL MMHG
STRESS POST PEAK HR: 75 BPM
STRESS TARGET HR: 123 BPM

## 2022-02-07 PROCEDURE — 93016 CV STRESS TEST SUPVJ ONLY: CPT | Performed by: INTERNAL MEDICINE

## 2022-02-07 PROCEDURE — 0 RUBIDIUM CHLORIDE: Performed by: NURSE PRACTITIONER

## 2022-02-07 PROCEDURE — 93018 CV STRESS TEST I&R ONLY: CPT | Performed by: INTERNAL MEDICINE

## 2022-02-07 PROCEDURE — 25010000002 REGADENOSON 0.4 MG/5ML SOLUTION: Performed by: NURSE PRACTITIONER

## 2022-02-07 PROCEDURE — 93306 TTE W/DOPPLER COMPLETE: CPT | Performed by: INTERNAL MEDICINE

## 2022-02-07 PROCEDURE — 93017 CV STRESS TEST TRACING ONLY: CPT

## 2022-02-07 PROCEDURE — 78492 MYOCRD IMG PET MLT RST&STRS: CPT | Performed by: INTERNAL MEDICINE

## 2022-02-07 PROCEDURE — A9555 RB82 RUBIDIUM: HCPCS | Performed by: NURSE PRACTITIONER

## 2022-02-07 PROCEDURE — 78492 MYOCRD IMG PET MLT RST&STRS: CPT

## 2022-02-07 PROCEDURE — 93306 TTE W/DOPPLER COMPLETE: CPT

## 2022-02-07 RX ADMIN — REGADENOSON 0.4 MG: 0.08 INJECTION, SOLUTION INTRAVENOUS at 13:11

## 2022-02-08 PROBLEM — I51.89 GRADE I DIASTOLIC DYSFUNCTION: Status: ACTIVE | Noted: 2022-02-08

## 2022-02-08 PROBLEM — I51.7 LVH (LEFT VENTRICULAR HYPERTROPHY): Status: ACTIVE | Noted: 2022-02-08

## 2022-02-08 LAB
AORTIC ARCH: 2.7 CM
ASCENDING AORTA: 3.7 CM
BH CV ECHO MEAS - ACS: 2.1 CM
BH CV ECHO MEAS - AO MAX PG (FULL): 3.3 MMHG
BH CV ECHO MEAS - AO MAX PG: 6 MMHG
BH CV ECHO MEAS - AO MEAN PG (FULL): 2 MMHG
BH CV ECHO MEAS - AO MEAN PG: 3.6 MMHG
BH CV ECHO MEAS - AO ROOT AREA (BSA CORRECTED): 1.8
BH CV ECHO MEAS - AO ROOT AREA: 11.8 CM^2
BH CV ECHO MEAS - AO ROOT DIAM: 3.9 CM
BH CV ECHO MEAS - AO V2 MAX: 122.2 CM/SEC
BH CV ECHO MEAS - AO V2 MEAN: 92.1 CM/SEC
BH CV ECHO MEAS - AO V2 VTI: 29.4 CM
BH CV ECHO MEAS - ASC AORTA: 3.6 CM
BH CV ECHO MEAS - AVA(I,A): 2.6 CM^2
BH CV ECHO MEAS - AVA(I,D): 2.6 CM^2
BH CV ECHO MEAS - AVA(V,A): 2.4 CM^2
BH CV ECHO MEAS - AVA(V,D): 2.4 CM^2
BH CV ECHO MEAS - BSA(HAYCOCK): 2.3 M^2
BH CV ECHO MEAS - BSA: 2.1 M^2
BH CV ECHO MEAS - BZI_BMI: 49.9 KILOGRAMS/M^2
BH CV ECHO MEAS - BZI_METRIC_HEIGHT: 154.9 CM
BH CV ECHO MEAS - BZI_METRIC_WEIGHT: 119.8 KG
BH CV ECHO MEAS - EDV(MOD-SP4): 77 ML
BH CV ECHO MEAS - EDV(TEICH): 128.3 ML
BH CV ECHO MEAS - EF(CUBED): 79.8 %
BH CV ECHO MEAS - EF(MOD-BP): 58 %
BH CV ECHO MEAS - EF(MOD-SP4): 58.4 %
BH CV ECHO MEAS - EF(TEICH): 71.9 %
BH CV ECHO MEAS - ESV(MOD-SP4): 32 ML
BH CV ECHO MEAS - ESV(TEICH): 36.1 ML
BH CV ECHO MEAS - FS: 41.3 %
BH CV ECHO MEAS - IVS/LVPW: 1.1
BH CV ECHO MEAS - IVSD: 1.6 CM
BH CV ECHO MEAS - LAT PEAK E' VEL: 8.2 CM/SEC
BH CV ECHO MEAS - LV DIASTOLIC VOL/BSA (35-75): 36.2 ML/M^2
BH CV ECHO MEAS - LV MASS(C)D: 359.1 GRAMS
BH CV ECHO MEAS - LV MASS(C)DI: 168.9 GRAMS/M^2
BH CV ECHO MEAS - LV MAX PG: 2.7 MMHG
BH CV ECHO MEAS - LV MEAN PG: 1.7 MMHG
BH CV ECHO MEAS - LV SYSTOLIC VOL/BSA (12-30): 15.1 ML/M^2
BH CV ECHO MEAS - LV V1 MAX: 82.3 CM/SEC
BH CV ECHO MEAS - LV V1 MEAN: 61.9 CM/SEC
BH CV ECHO MEAS - LV V1 VTI: 21.6 CM
BH CV ECHO MEAS - LVIDD: 5.2 CM
BH CV ECHO MEAS - LVIDS: 3 CM
BH CV ECHO MEAS - LVLD AP4: 7.2 CM
BH CV ECHO MEAS - LVLS AP4: 6.7 CM
BH CV ECHO MEAS - LVOT AREA (M): 3.5 CM^2
BH CV ECHO MEAS - LVOT AREA: 3.6 CM^2
BH CV ECHO MEAS - LVOT DIAM: 2.1 CM
BH CV ECHO MEAS - LVPWD: 1.5 CM
BH CV ECHO MEAS - MED PEAK E' VEL: 6.6 CM/SEC
BH CV ECHO MEAS - MV A DUR: 0.11 SEC
BH CV ECHO MEAS - MV A MAX VEL: 78.4 CM/SEC
BH CV ECHO MEAS - MV DEC SLOPE: 299.2 CM/SEC^2
BH CV ECHO MEAS - MV DEC TIME: 0.17 SEC
BH CV ECHO MEAS - MV E MAX VEL: 53.6 CM/SEC
BH CV ECHO MEAS - MV E/A: 0.68
BH CV ECHO MEAS - MV MAX PG: 2.9 MMHG
BH CV ECHO MEAS - MV MEAN PG: 0.99 MMHG
BH CV ECHO MEAS - MV P1/2T MAX VEL: 63.1 CM/SEC
BH CV ECHO MEAS - MV P1/2T: 61.8 MSEC
BH CV ECHO MEAS - MV V2 MAX: 84.8 CM/SEC
BH CV ECHO MEAS - MV V2 MEAN: 46.2 CM/SEC
BH CV ECHO MEAS - MV V2 VTI: 21.2 CM
BH CV ECHO MEAS - MVA P1/2T LCG: 3.5 CM^2
BH CV ECHO MEAS - MVA(P1/2T): 3.6 CM^2
BH CV ECHO MEAS - MVA(VTI): 3.6 CM^2
BH CV ECHO MEAS - PA ACC TIME: 0.15 SEC
BH CV ECHO MEAS - PA MAX PG (FULL): 1.6 MMHG
BH CV ECHO MEAS - PA MAX PG: 2.7 MMHG
BH CV ECHO MEAS - PA PR(ACCEL): 12.5 MMHG
BH CV ECHO MEAS - PA V2 MAX: 81.4 CM/SEC
BH CV ECHO MEAS - PVA(V,A): 3.1 CM^2
BH CV ECHO MEAS - PVA(V,D): 3.1 CM^2
BH CV ECHO MEAS - QP/QS: 0.81
BH CV ECHO MEAS - RAP SYSTOLE: 3 MMHG
BH CV ECHO MEAS - RV MAX PG: 1 MMHG
BH CV ECHO MEAS - RV MEAN PG: 0.54 MMHG
BH CV ECHO MEAS - RV V1 MAX: 50.6 CM/SEC
BH CV ECHO MEAS - RV V1 MEAN: 34.9 CM/SEC
BH CV ECHO MEAS - RV V1 VTI: 12.3 CM
BH CV ECHO MEAS - RVOT AREA: 5 CM^2
BH CV ECHO MEAS - RVOT DIAM: 2.5 CM
BH CV ECHO MEAS - RVSP: 22 MMHG
BH CV ECHO MEAS - SI(AO): 163.2 ML/M^2
BH CV ECHO MEAS - SI(CUBED): 52.1 ML/M^2
BH CV ECHO MEAS - SI(LVOT): 36.3 ML/M^2
BH CV ECHO MEAS - SI(MOD-SP4): 21.2 ML/M^2
BH CV ECHO MEAS - SI(TEICH): 43.4 ML/M^2
BH CV ECHO MEAS - SUP REN AO DIAM: 2.2 CM
BH CV ECHO MEAS - SV(AO): 347 ML
BH CV ECHO MEAS - SV(CUBED): 110.8 ML
BH CV ECHO MEAS - SV(LVOT): 77.2 ML
BH CV ECHO MEAS - SV(MOD-SP4): 45 ML
BH CV ECHO MEAS - SV(RVOT): 62.3 ML
BH CV ECHO MEAS - SV(TEICH): 92.2 ML
BH CV ECHO MEAS - TAPSE (>1.6): 1.4 CM
BH CV ECHO MEAS - TR MAX VEL: 220.6 CM/SEC
BH CV ECHO MEASUREMENTS AVERAGE E/E' RATIO: 7.24
BH CV XLRA - RV BASE: 3.9 CM
BH CV XLRA - RV LENGTH: 6.6 CM
BH CV XLRA - RV MID: 3.8 CM
BH CV XLRA - TDI S': 11.1 CM/SEC
LEFT ATRIUM VOLUME INDEX: 25 ML/M2
LV EF 2D ECHO EST: 60 %
MAXIMAL PREDICTED HEART RATE: 145 BPM
SINUS: 3.6 CM
STJ: 3.1 CM
STRESS TARGET HR: 123 BPM

## 2022-02-09 ENCOUNTER — TELEPHONE (OUTPATIENT)
Dept: CARDIOLOGY | Facility: CLINIC | Age: 77
End: 2022-02-09

## 2022-02-09 RX ORDER — FUROSEMIDE 40 MG/1
40 TABLET ORAL DAILY
Qty: 90 TABLET | Refills: 0 | Status: SHIPPED | OUTPATIENT
Start: 2022-02-09 | End: 2022-02-23 | Stop reason: SDUPTHER

## 2022-02-09 NOTE — TELEPHONE ENCOUNTER
Echocardiogram showed normal LVEF, moderate LVH, grade 1 diastolic dysfunction, and no wall motion abnormalities.      Cardiac PET scan showed small to medium size, mildly severe area of ischemia in the inferior wall.  Dr. Callahan reviewed with Dr. Almeida who did her last heart catheterization and they recommended medical treatment.    She continues to have shortness of breath and lower extremity edema.  I recommended a trial of furosemide 40 mg 1 tablet daily.  She has previously been on HCTZ, but it caused her nausea.  I would like her to come back and see me in 2 weeks.

## 2022-02-23 ENCOUNTER — LAB (OUTPATIENT)
Dept: LAB | Facility: HOSPITAL | Age: 77
End: 2022-02-23

## 2022-02-23 ENCOUNTER — OFFICE VISIT (OUTPATIENT)
Dept: CARDIOLOGY | Facility: CLINIC | Age: 77
End: 2022-02-23

## 2022-02-23 VITALS
BODY MASS INDEX: 49.09 KG/M2 | SYSTOLIC BLOOD PRESSURE: 116 MMHG | DIASTOLIC BLOOD PRESSURE: 58 MMHG | HEART RATE: 60 BPM | WEIGHT: 260 LBS | HEIGHT: 61 IN

## 2022-02-23 DIAGNOSIS — I51.89 GRADE I DIASTOLIC DYSFUNCTION: ICD-10-CM

## 2022-02-23 DIAGNOSIS — R94.31 ABNORMAL EKG: ICD-10-CM

## 2022-02-23 DIAGNOSIS — R60.0 LOWER EXTREMITY EDEMA: ICD-10-CM

## 2022-02-23 DIAGNOSIS — I25.10 CORONARY ARTERY DISEASE INVOLVING NATIVE CORONARY ARTERY OF NATIVE HEART WITHOUT ANGINA PECTORIS: ICD-10-CM

## 2022-02-23 DIAGNOSIS — R06.02 SHORTNESS OF BREATH: Primary | ICD-10-CM

## 2022-02-23 DIAGNOSIS — E66.01 CLASS 3 SEVERE OBESITY DUE TO EXCESS CALORIES WITH SERIOUS COMORBIDITY AND BODY MASS INDEX (BMI) OF 45.0 TO 49.9 IN ADULT: ICD-10-CM

## 2022-02-23 DIAGNOSIS — R06.02 SHORTNESS OF BREATH: ICD-10-CM

## 2022-02-23 DIAGNOSIS — G47.33 OBSTRUCTIVE APNEA: ICD-10-CM

## 2022-02-23 DIAGNOSIS — E78.5 DYSLIPIDEMIA: ICD-10-CM

## 2022-02-23 DIAGNOSIS — I10 PRIMARY HYPERTENSION: ICD-10-CM

## 2022-02-23 DIAGNOSIS — I51.7 LVH (LEFT VENTRICULAR HYPERTROPHY): ICD-10-CM

## 2022-02-23 LAB
ANION GAP SERPL CALCULATED.3IONS-SCNC: 11.6 MMOL/L (ref 5–15)
BUN SERPL-MCNC: 14 MG/DL (ref 8–23)
BUN/CREAT SERPL: 19.4 (ref 7–25)
CALCIUM SPEC-SCNC: 8.9 MG/DL (ref 8.6–10.5)
CHLORIDE SERPL-SCNC: 107 MMOL/L (ref 98–107)
CO2 SERPL-SCNC: 23.4 MMOL/L (ref 22–29)
CREAT SERPL-MCNC: 0.72 MG/DL (ref 0.57–1)
GFR SERPL CREATININE-BSD FRML MDRD: 79 ML/MIN/1.73
GLUCOSE SERPL-MCNC: 131 MG/DL (ref 65–99)
NT-PROBNP SERPL-MCNC: 120 PG/ML (ref 0–1800)
POTASSIUM SERPL-SCNC: 4 MMOL/L (ref 3.5–5.2)
SODIUM SERPL-SCNC: 142 MMOL/L (ref 136–145)

## 2022-02-23 PROCEDURE — 80048 BASIC METABOLIC PNL TOTAL CA: CPT

## 2022-02-23 PROCEDURE — 99214 OFFICE O/P EST MOD 30 MIN: CPT | Performed by: NURSE PRACTITIONER

## 2022-02-23 PROCEDURE — 83880 ASSAY OF NATRIURETIC PEPTIDE: CPT

## 2022-02-23 PROCEDURE — 93000 ELECTROCARDIOGRAM COMPLETE: CPT | Performed by: NURSE PRACTITIONER

## 2022-02-23 PROCEDURE — 36415 COLL VENOUS BLD VENIPUNCTURE: CPT

## 2022-02-23 RX ORDER — FUROSEMIDE 40 MG/1
40 TABLET ORAL DAILY
Qty: 90 TABLET | Refills: 3 | Status: SHIPPED | OUTPATIENT
Start: 2022-02-23

## 2022-02-23 NOTE — PROGRESS NOTES
Date of Office Visit: 2022  Encounter Provider: MANDIE Silverio  Place of Service: [facility]  Patient Name: Julee King  :1945   Primary Cardiologist: Dr. Anastasiia Callahan    Chief Complaint   Patient presents with   • Med Management   • Shortness of Breath   • Follow-up   :     HPI: Julee King is a 76 y.o. female who presents today for follow-up on shortness of breath, lower extremity edema, and medication management I have reviewed her medical records.    She has been diagnosed with hypertension, hyperlipidemia, carotid artery disease, obstructive sleep apnea (compliant with CPAP machine), and obesity. In 2016, she was diagnosed with coronary artery disease, left ventricular dysfunction with an EF of 33%, and underwent coronary artery bypass graft surgery.  She was severely anemic from a GI bleed at that time as well.  Her ejection fraction then normalized.     In 2019, she presented to the ED with dyspnea and chest heaviness.  D-dimer was elevated and VQ scan was negative for pulmonary emboli.  Echocardiogram showed normal LVEF of 58% and mild LVH.  Cardiac PET scan was abnormal.  She underwent cardiac catheterization which revealed multivessel coronary artery disease with 2 out of 4 patent grafts with medical treatment recommended.  Dr. Almeida said if medical treatment fails we could consider PCI of the distal left main to help with perfusion of the distal left circumflex.    In 2020, she saw Dr. Callahan in the office.  She reported chronic dyspnea which was unchanged.  In 2022, she saw me in the office.  She was experiencing chronic dyspnea and bilateral lower extremity edema; left greater than right.  Echocardiogram showed normal LVEF, moderate LVH, grade 1 diastolic dysfunction, no wall motion abnormalities. Cardiac PET scan showed small to medium size, mildly severe area of ischemia in the inferior  mayra.  Dr. Callahan reviewed with Dr. Almeida who did her last heart catheterization and they recommended medical treatment.  I recommended a trial of furosemide 40 mg daily.  I recommended 2-week follow-up visit with me and a BMP.    She presents today for follow-up visit.  She started taking the furosemide 40 mg daily about a week ago.  She reports some occasional lightheadedness, but her shortness of breath and lower extremity edema have both improved.  Her weight is down 4 pounds since last visit.  Her blood pressure and heart rate are both normal today.  Her blood pressure was stable upon rest.  When she stood she felt lightheaded and blood pressure was 116/58.  I recommended that she increase her hydration.  She denies chest pain, PND, orthopnea, cough, palpitations, syncope, or bleeding.      Past Medical History:   Diagnosis Date   • Bronchitis 05/2016   • Cardiomyopathy (HCC)    • Carotid artery disease (HCC)     16-49% stenosis of the left internal carotid 2016; carotid duplex 1/2017 - normal   • Colon polyps    • Coronary artery disease 01/2016    Non-STEMI and status post CABG   • Dyslipidemia    • GI bleed     Severely anemic and received a blood transfusion   • Grade I diastolic dysfunction 2/8/2022    Per echocardiogram 2/2022   • Hiatal hernia    • Hyperlipidemia    • Hypertension    • Hypocalcemia    • Hypokalemia    • LVH (left ventricular hypertrophy) 2/8/2022    Mild to moderate per echo 2/2022   • Non-STEMI (non-ST elevated myocardial infarction) (HCC) 01/2016   • Obesity    • LOVE (obstructive sleep apnea)     moderate to severe; compliant with CPAP machine   • PVC (premature ventricular contraction)        Past Surgical History:   Procedure Laterality Date   • CARDIAC CATHETERIZATION N/A 2/11/2019    Procedure: Coronary angiography;  Surgeon: Cheryl Almeida MD;  Location: [facility] INVASIVE LOCATION;  Service: Cardiovascular   • CHOLECYSTECTOMY     • COLONOSCOPY N/A 6/8/2016    Procedure:  COLONOSCOPY to cecum with cold biopsy polypectomies;  Surgeon: Alvarado Rivera MD;  Location: [facility] ENDOSCOPY;  Service:    • CORONARY ARTERY BYPASS GRAFT  02/28/2016    CABG x4  Dr Monroy;  CHOI to LAD, SVG to obtuse marginal 1, SVG to obtuse marginal 2, and SVG to LV branch   • ENDOSCOPY N/A 6/8/2016    Procedure: ESOPHAGOGASTRODUODENOSCOPY with biopsy;  Surgeon: Alvarado Rivera MD;  Location: [facility] ENDOSCOPY;  Service:    • ENTEROSCOPY SMALL BOWEL N/A 8/2/2017    Procedure: ENTEROSCOPY SMALL BOWEL WITH COLD BIOPSIES AND ARGON PLASMA COAGULATION TO  GASTIC ULCERS;  Surgeon: Alvarado Rivera MD;  Location: [facility] ENDOSCOPY;  Service:    • HERNIA REPAIR     • JOINT REPLACEMENT      vijaya knees   • OTHER SURGICAL HISTORY      KNEE REPLACEMENT   • OTHER SURGICAL HISTORY      TREATMENT OF ANKLE FRACTURE   • OTHER SURGICAL HISTORY      TREATMENT FRACTURE OF THE HAND       Social History     Socioeconomic History   • Marital status:    Tobacco Use   • Smoking status: Never Smoker   • Smokeless tobacco: Never Used   Substance and Sexual Activity   • Alcohol use: Yes     Alcohol/week: 0.0 standard drinks     Comment: wine q 6 months   • Drug use: Never   • Sexual activity: Not Currently     Partners: Male     Birth control/protection: None       Family History   Problem Relation Age of Onset   • Coronary artery disease Mother    • Other Father    • Coronary artery disease Father    • Stroke Sister    • Heart attack Sister    • Heart attack Brother        Review of Systems   Constitutional: Negative for chills, diaphoresis, fever, night sweats, weight gain and weight loss.   HENT: Negative for hearing loss, nosebleeds, sore throat and tinnitus.    Eyes: Negative for blurred vision, double vision, pain and visual disturbance.   Cardiovascular: Positive for dyspnea on exertion and leg swelling. Negative for chest pain, claudication, cyanosis, irregular heartbeat, near-syncope, orthopnea, palpitations, paroxysmal nocturnal  "dyspnea and syncope.   Respiratory: Positive for cough and shortness of breath. Negative for hemoptysis, snoring and wheezing.    Endocrine: Negative for cold intolerance, heat intolerance and polyuria.   Hematologic/Lymphatic: Negative for bleeding problem. Does not bruise/bleed easily.   Skin: Negative for color change, dry skin, flushing and itching.   Musculoskeletal: Negative for falls, joint pain, joint swelling, muscle cramps, muscle weakness and myalgias.   Gastrointestinal: Negative for abdominal pain, constipation, heartburn, melena, nausea and vomiting.   Genitourinary: Negative for dysuria and hematuria.   Neurological: Positive for dizziness. Negative for excessive daytime sleepiness, headaches, light-headedness, loss of balance, numbness, paresthesias, seizures and vertigo.   Psychiatric/Behavioral: Negative for altered mental status, depression, memory loss and substance abuse. The patient does not have insomnia and is not nervous/anxious.    Allergic/Immunologic: Negative for environmental allergies.       Allergies   Allergen Reactions   • Codeine      \"makes me loopy\"   • Iodinated Diagnostic Agents      itching   • Hctz [Hydrochlorothiazide] Nausea Only   • Sulfa Antibiotics Unknown (See Comments)     NOT SURE         Current Outpatient Medications:   •  aspirin 81 MG tablet, Take 1 tablet by mouth daily., Disp: , Rfl:   •  atorvastatin (LIPITOR) 40 MG tablet, TAKE 1 TABLET DAILY, Disp: 90 tablet, Rfl: 3  •  carvedilol (COREG) 25 MG tablet, Take 1 tablet by mouth 2 (Two) Times a Day., Disp: 180 tablet, Rfl: 3  •  furosemide (LASIX) 40 MG tablet, Take 1 tablet by mouth Daily., Disp: 90 tablet, Rfl: 3  •  losartan (COZAAR) 100 MG tablet, Take 1 tablet by mouth Every Night., Disp: 90 tablet, Rfl: 3  •  NON FORMULARY, C PAP, Disp: , Rfl:       Objective:     Vitals:    02/23/22 1039 02/23/22 1047 02/23/22 1115   BP: 120/76 120/70 116/58   BP Location: Left arm Right arm Left arm   Patient Position:   " "Standing   Pulse: 60     Weight: 118 kg (260 lb)     Height: 154.9 cm (61\")       Body mass index is 49.13 kg/m².    PHYSICAL EXAM:    Vitals Reviewed.   General Appearance: No acute distress, well developed and well nourished. Obese.   Eyes: Conjunctiva and lids: No erythema, swelling, or discharge. Sclera non-icteric.   HENT: Atraumatic, normocephalic. External eyes, ears, and nose normal. No hearing loss noted. Mucous membranes normal. Lips not cyanotic. Neck supple with no tenderness.  Wearing a mask.  Respiratory: No signs of respiratory distress. Respiration rhythm and depth normal.   Clear to auscultation. No rales, crackles, rhonchi, or wheezing auscultated.   Cardiovascular:  Jugular Venous Pressure: Normal  Heart Rate and Rhythm: Normal, Heart Sounds: Normal S1 and S2. No S3 or S4 noted.  Murmurs: No murmurs noted. No rubs, thrills, or gallops.   Lower Extremities: Bilateral +1 lower extremity edema noted; left greater than right.  Gastrointestinal:  Abdomen soft, non-distended, non-tender. Normal bowel sounds. No hepatomegaly.   Musculoskeletal: Normal movement of extremities  Skin and Nails: General appearance normal. No pallor, cyanosis, diaphoresis. Skin temperature normal. No clubbing of fingernails.   Psychiatric: Patient alert and oriented to person, place, and time. Speech and behavior appropriate. Normal mood and affect.       ECG 12 Lead    Date/Time: 2/23/2022 10:37 AM  Performed by: Anabel Wood APRN  Authorized by: Anabel Wood APRN   Comparison: compared with previous ECG from 1/17/2022  Similar to previous ECG  Rhythm: sinus rhythm  Rate: normal  BPM: 60  Conduction: conduction normal  T inversion: V1-V6  QRS axis: normal  Other findings: T wave abnormality    Clinical impression: abnormal EKG              Assessment:       Diagnosis Plan   1. Shortness of breath  proBNP   2. Lower extremity edema     3. Grade I diastolic dysfunction  Basic Metabolic Panel   4. Coronary artery " disease involving native coronary artery of native heart without angina pectoris     5. Abnormal EKG     6. LVH (left ventricular hypertrophy)     7. Primary hypertension     8. Dyslipidemia     9. Obstructive apnea     10. Class 3 severe obesity due to excess calories with serious comorbidity and body mass index (BMI) of 45.0 to 49.9 in adult (HCC)            Plan:       1/2.  Dyspnea and Edema: Her symptoms of both improved on furosemide 40 mg daily that she started about a week ago.  She wants to leave the dosage the same at this time and will continue to monitor.  She may benefit from an increased dose to 60 or 80 mg if her symptoms continue.  She will have a repeat BMP today.  Her weight is down 4 pounds since last visit.    3.  Diastolic Dysfunction: I think her symptoms of dyspnea and edema are related to this.  She has a history of ischemic cardiomyopathy and her last EF was normal.    4.  Coronary Artery Disease: Status post CABG January 2016.  In January 2019, she was noted to have 2 out of 4 patent bypass grafts.  If her angina persisted, Dr. Almeida said she could proceed with intervention of the left main to left circumflex.  Her EKG tracing today shows more pronounced T wave inversions.  Dr. Callahan to recently reviewed with Dr. Almeida and they still recommend medical treatment.  Continue aspirin and atorvastatin.    5/6.  Abnormal EKG: She has pronounced T wave inversions most likely related to the moderate LVH.    7.  Hypertension: Blood pressure is well controlled on current medication regimen.    8.  Hyperlipidemia: She remains on atorvastatin.    9.  Obstructive Sleep Apnea: Compliant with CPAP machine.  She does not like using the machine.    10.  Obesity: BMI is 49.2.  She would benefit from exercise, weight loss, and heart healthy diet.    11.  I will follow-up with her about the BMP results.  I recommended a 6-month follow-up visit with Dr. Anastasiia Callahan.    ADDENDUM:  · BMP normal except  for glucose of 131.  Continue furosemide at 40 mg daily.  Call with concerns.  Patient informed.    As always, it has been a pleasure to participate in your patient's care.      Sincerely,         MANDIE Rodriguez      · Dictated using Dragon  · COVID-19 Precautions - Patient was compliant in wearing a mask. When I saw the patient, I used appropriate personal protective equipment (PPE) including mask and eye shield (standard procedure).  Additionally, I used gown and gloves if indicated.  Hand hygiene was completed before and after seeing the patient.  I spent 34 minutes reviewing her medical records/testing/previous office notes/labs, face-to-face interaction with patient, physical examination, formulating the plan of care, and discussion of plan of care with patient.

## 2022-08-31 ENCOUNTER — OFFICE VISIT (OUTPATIENT)
Dept: CARDIOLOGY | Facility: CLINIC | Age: 77
End: 2022-08-31

## 2022-08-31 VITALS
HEART RATE: 48 BPM | DIASTOLIC BLOOD PRESSURE: 86 MMHG | SYSTOLIC BLOOD PRESSURE: 128 MMHG | WEIGHT: 259 LBS | BODY MASS INDEX: 48.9 KG/M2 | HEIGHT: 61 IN

## 2022-08-31 DIAGNOSIS — I51.7 LVH (LEFT VENTRICULAR HYPERTROPHY): ICD-10-CM

## 2022-08-31 DIAGNOSIS — E66.01 CLASS 3 SEVERE OBESITY DUE TO EXCESS CALORIES WITH SERIOUS COMORBIDITY AND BODY MASS INDEX (BMI) OF 45.0 TO 49.9 IN ADULT: ICD-10-CM

## 2022-08-31 DIAGNOSIS — I25.10 CORONARY ARTERY DISEASE INVOLVING NATIVE CORONARY ARTERY OF NATIVE HEART WITHOUT ANGINA PECTORIS: Primary | ICD-10-CM

## 2022-08-31 DIAGNOSIS — E78.5 DYSLIPIDEMIA: ICD-10-CM

## 2022-08-31 DIAGNOSIS — I10 PRIMARY HYPERTENSION: ICD-10-CM

## 2022-08-31 DIAGNOSIS — I51.89 GRADE I DIASTOLIC DYSFUNCTION: ICD-10-CM

## 2022-08-31 PROCEDURE — 99214 OFFICE O/P EST MOD 30 MIN: CPT | Performed by: INTERNAL MEDICINE

## 2022-08-31 PROCEDURE — 93000 ELECTROCARDIOGRAM COMPLETE: CPT | Performed by: INTERNAL MEDICINE

## 2022-08-31 NOTE — PROGRESS NOTES
Date of Office Visit: 2022  Encounter Provider: Anastasiia Callahan MD  Place of Service: [facility] CARDIOLOGY  Patient Name: Julee King  :1945      Patient ID:  Julee King is a 77 y.o. female is here for  followup for CAD, h/o CABG.         History of Present Illness    She is followed for CAD, s/p CABG. she has a history of hypertension, hyperlipidemia, morbid obesity, LOVE on CPAP.     She came in with midsternal chest pain 2014 to [facility]. She had known history  of hypertension, hyperlipidemia, and had had a history of mild obstructive sleep apnea,  but was not using a CPAP machine. She was exercising at the [facility] 3 times a week and when  she would get on the treadmill she would notice this chest tightness. When we saw her in  the chest pain unit her blood pressure was a bit elevated. We ordered a couple of studies.  She had a 2-D echocardiogram with Doppler done 2014 showing ejection fraction of 65%  with grade I diastolic dysfunction and mild left ventricular hypertrophy. She also had a  PET stress study, which was done 2014, which showed no evidence of ischemia. We then  made a referral to the [facility] at [facility]. She was found  to have obstructive sleep apnea, which was moderate to severe, with recommendation of a  CPAP, which she is currently using. She says she is having some difficulty tolerating it,  but she is going to try to stick with it.       She then presented to the hospital 2016 with chest pain and pressure. She was severely anemic and found to have gastrointestinal bleeding. She did receive an transfusions. Her troponin was elevated at 4. Her echocardiogram showed left ventricular ejection fraction of 33% with mild to moderate mitral insufficiency. She went on to have a cardiac catheterization done which showed 80% distal left main stenosis, 60% distal first obtuse marginal stenosis, 30%  ramus mid vessel stenosis, 30% proximal LAD stenosis, 100% occluded first diagonal branch, 70% mid LAD stenosis, 100% proximal RCA stenosis and 95% distal RCA stenosis. She went on to have coronary bypass grafting 1/22/2016. She received LIMA to LAD, SVG to obtuse marginal 1, SVG to obtuse marginal 2 and SVG to LV branch. She also had endoscopy Dr. Senior done 1/24/2016 showing small hiatal hernia, normal stomach and duodenal.       She sees Dr. Galaviz for LOVE. She has a colonoscopy 6/2016 with Dr. Rivera and it was good.      She was evaluated in January 2019 and reported worsening dyspnea upon exertion for 6 weeks.  Her d-dimer was mildly elevated.  She also noted some occasional mild chest heaviness.  she had a VQ scan completed (unable to do CTA of chest because of allergy to iodine) which was negative for pulmonary emboli.  She had a 2D echocardiogram completed 1/31/19 which revealed an EF of 58%, mild LVH, and trace valvular regurgitation.  Cardiac PET scan 1/31/19 was abnormal revealing a medium size, mildly severe area of ischemia in the anterior and inferior lateral wall.       Catheterization in 2/11/2019 showed 80% distal left main, 50% proximal LAD with 100% mid LAD stenosis, 30% proximal circumflex with 50% mid circumflex, patent high large first obtuse marginal branch, 100% occluded RCA, patent LIMA to LAD with patent distal LAD, patent SVG to OM 1, occluded SVG to OM 2 and occluded SVG to RCA.  Dr. Almeida recommended medical management and if this fails to consider PCI of the distal left main to help with perfusion of the distal left circumflex system.     Labs on 1/17/22 show glucose 116, otherwise normal CMP, total cholesterol 122, HDL 35, LDL 64, VLDL 23, triglycerides 127, normal CBC.  Stress nuclear perfusion study on 2/7/2022 showed a small to moderate sized area of mild ischemia of the inferior wall.  Echo done 2/1/2022 showed moderate concentric left ventricular hypertrophy with grade 1 diastolic  dysfunction, ejection fraction 60% and RVSP 22 mmHg.    She takes care of her  who was ill.  He falls a lot and she has helped him up.  When that happens, sometimes she is short winded with slight chest pain and sometimes her heart racing.  When she is just doing normal activity, she gets no chest pain or pressure.  She thinks the stress of helping him causes this.  She has no orthopnea or PND.  She uses her CPAP every night.  She does not feel her heart racing or skipping normally.  She has had no syncope or falls.  She has mild chronic exertional dyspnea which is unchanged.  She really has no appreciable chest pressure regularly.  She is taking her medications as directed without difficulty.    Past Medical History:   Diagnosis Date   • Bronchitis 05/2016   • Cardiomyopathy (HCC)    • Carotid artery disease (HCC)     16-49% stenosis of the left internal carotid 2016; carotid duplex 1/2017 - normal   • Colon polyps    • Coronary artery disease 01/2016    Non-STEMI and status post CABG   • Dyslipidemia    • GI bleed     Severely anemic and received a blood transfusion   • Grade I diastolic dysfunction 02/08/2022    Per echocardiogram 2/2022   • Hiatal hernia    • Hyperlipidemia    • Hypertension    • Hypocalcemia    • Hypokalemia    • LVH (left ventricular hypertrophy) 02/08/2022    Mild to moderate per echo 2/2022   • Non-STEMI (non-ST elevated myocardial infarction) ([facility]) 01/2016   • Obesity    • LOVE (obstructive sleep apnea)     moderate to severe; compliant with CPAP machine   • PVC (premature ventricular contraction)          Past Surgical History:   Procedure Laterality Date   • CARDIAC CATHETERIZATION N/A 02/11/2019    Procedure: Coronary angiography;  Surgeon: Cheryl Almeida MD;  Location: [facility] INVASIVE LOCATION;  Service: Cardiovascular   • CARDIAC CATHETERIZATION N/A 02/11/2019    Procedure: Left Heart Cath;  Surgeon: Cheryl Almeida MD;  Location: [facility] INVASIVE LOCATION;  Service:  Cardiovascular   • CARDIAC CATHETERIZATION N/A 02/11/2019    Procedure: Left ventriculography;  Surgeon: Cheryl Almeida MD;  Location:  [facility] CATH INVASIVE LOCATION;  Service: Cardiovascular   • CARDIAC CATHETERIZATION  02/11/2019    Procedure: Saphenous Vein Graft;  Surgeon: Cheryl Almeida MD;  Location:  [facility] CATH INVASIVE LOCATION;  Service: Cardiovascular   • CARDIAC CATHETERIZATION N/A 02/11/2019    Procedure: Native mammary injection;  Surgeon: Cheryl Almeida MD;  [facility];  Service: Cardiovascular   • CHOLECYSTECTOMY     • COLONOSCOPY N/A 06/08/2016    Procedure: COLONOSCOPY to cecum with cold biopsy polypectomies;  Surgeon: Alvarado Rivera MD;  Location: [facility] ENDOSCOPY;  Service:    • CORONARY ARTERY BYPASS GRAFT  02/28/2016    CABG x4  Dr Monroy;  CHOI to LAD, SVG to obtuse marginal 1, SVG to obtuse marginal 2, and SVG to LV branch   • ENDOSCOPY N/A 06/08/2016    Procedure: ESOPHAGOGASTRODUODENOSCOPY with biopsy;  Surgeon: Alvarado Rivera MD;  Location: [facility] ENDOSCOPY;  Service:    • ENTEROSCOPY SMALL BOWEL N/A 08/02/2017    Procedure: ENTEROSCOPY SMALL BOWEL WITH COLD BIOPSIES AND ARGON PLASMA COAGULATION TO  GASTIC ULCERS;  Surgeon: Alvarado Rivera MD;  Location: [facility] ENDOSCOPY;  Service:    • HERNIA REPAIR     • JOINT REPLACEMENT      vijaya knees   • OTHER SURGICAL HISTORY      KNEE REPLACEMENT   • OTHER SURGICAL HISTORY      TREATMENT OF ANKLE FRACTURE   • OTHER SURGICAL HISTORY      TREATMENT FRACTURE OF THE HAND       Current Outpatient Medications on File Prior to Visit   Medication Sig Dispense Refill   • aspirin 81 MG tablet Take 1 tablet by mouth daily.     • atorvastatin (LIPITOR) 40 MG tablet TAKE 1 TABLET DAILY 90 tablet 3   • carvedilol (COREG) 25 MG tablet Take 1 tablet by mouth 2 (Two) Times a Day. 180 tablet 3   • furosemide (LASIX) 40 MG tablet Take 1 tablet by mouth Daily. 90 tablet 3   • losartan (COZAAR) 100 MG tablet Take 1 tablet by mouth Every  "Night. 90 tablet 3   • NON FORMULARY C PAP       No current facility-administered medications on file prior to visit.       Social History     Socioeconomic History   • Marital status:    Tobacco Use   • Smoking status: Never Smoker   • Smokeless tobacco: Never Used   Substance and Sexual Activity   • Alcohol use: Yes     Comment: WINE MAYBE EVERY COUPLE OF MONTHS   • Drug use: Never   • Sexual activity: Not Currently     Partners: Male     Birth control/protection: None           ROS    Procedures    ECG 12 Lead    Date/Time: 8/31/2022 12:11 PM  Performed by: Anastasiia Callahan MD  Authorized by: Anastasiia Callahan MD   Comparison: compared with previous ECG   Comparison to previous ECG: ecg improved  Rhythm: sinus rhythm  ST Depression: I and aVL  T inversion: V2  Other findings: low voltage    Clinical impression: abnormal EKG                Objective:      Vitals:    08/31/22 1145   BP: 128/86   Pulse: (!) 48   Weight: 117 kg (259 lb)   Height: 154.9 cm (61\")     Body mass index is 48.94 kg/m².    Vitals reviewed.   Constitutional:       General: Not in acute distress.     Appearance: Well-developed. Morbidly obese. Not diaphoretic.   Eyes:      General: No scleral icterus.     Conjunctiva/sclera: Conjunctivae normal.   HENT:      Head: Normocephalic and atraumatic.   Neck:      Thyroid: No thyromegaly.      Vascular: No carotid bruit or JVD.      Lymphadenopathy: No cervical adenopathy.   Pulmonary:      Effort: Pulmonary effort is normal. No respiratory distress.      Breath sounds: Normal breath sounds. No wheezing. No rhonchi. No rales.   Chest:      Chest wall: Not tender to palpatation.   Cardiovascular:      Normal rate. Regular rhythm.      Murmurs: There is no murmur.      No gallop.   Pulses:     Intact distal pulses.   Edema:     Peripheral edema absent.   Abdominal:      General: Bowel sounds are normal. There is no distension or abdominal bruit.      Palpations: Abdomen is soft. There " is no abdominal mass.      Tenderness: There is no abdominal tenderness.   Musculoskeletal:         General: No deformity.      Extremities: No clubbing present.     Cervical back: Neck supple. Skin:     General: Skin is warm and dry. There is no cyanosis.      Coloration: Skin is not pale.      Findings: No rash.   Neurological:      Mental Status: Alert and oriented to person, place, and time.      Cranial Nerves: No cranial nerve deficit.   Psychiatric:         Judgment: Judgment normal.         Lab Review:       Assessment:      Diagnosis Plan   1. Coronary artery disease involving native coronary artery of native heart without angina pectoris     2. Dyslipidemia     3. Grade I diastolic dysfunction     4. Primary hypertension     5. Class 3 severe obesity due to excess calories with serious comorbidity and body mass index (BMI) of 45.0 to 49.9 in adult ([facility])     6. LVH (left ventricular hypertrophy)       1.                  Coronary artery bypass grafting in 01/2016.  cath done 2/2019 showed SVG to OM2 and SvG to RCA occluded.  Treated medically but if failure then consider LM intervention to help with left circumflex perfusion.  Continue medical therapy.  Had inferior wall ischemia on stress nuclear perfusion study 2/7/2022 but has no unstable anginal symptoms and normal ejection fraction.  2.                   Gastrointestinal bleeding in 01/2016.  No recurrence.  3.                   Hypertension with moderate LVH.  controlled.   4.                   Hyperlipidemia.  On atorvastatin.  5.                   Obstructive sleep apnea.  on CPAP.   6.                   Obesity.  7.    Social stressors, cares for her .     Plan:       See Anabel in 1 year, no other testing this time, would treat her coronary disease medically.  She has a mildly abnormal stress nuclear perfusion study but no significant angina-can to new medical therapy.

## 2022-11-22 RX ORDER — ATORVASTATIN CALCIUM 40 MG/1
TABLET, FILM COATED ORAL
Qty: 90 TABLET | Refills: 2 | Status: SHIPPED | OUTPATIENT
Start: 2022-11-22

## 2022-11-22 NOTE — TELEPHONE ENCOUNTER
Next OV-09/07/23-TK  Last OV-08/31/22-RM    Plan:       See Anabel in 1 year, no other testing this time, would treat her coronary disease medically.  She has a mildly abnormal stress nuclear perfusion study but no significant angina-can to new medical therapy.

## 2023-01-24 RX ORDER — LOSARTAN POTASSIUM 100 MG/1
TABLET ORAL
Qty: 90 TABLET | Refills: 3 | Status: SHIPPED | OUTPATIENT
Start: 2023-01-24

## 2023-02-02 RX ORDER — CARVEDILOL 25 MG/1
TABLET ORAL
Qty: 180 TABLET | Refills: 3 | Status: SHIPPED | OUTPATIENT
Start: 2023-02-02

## 2023-08-21 DIAGNOSIS — E78.5 DYSLIPIDEMIA: Primary | ICD-10-CM

## 2023-08-21 RX ORDER — ATORVASTATIN CALCIUM 40 MG/1
TABLET, FILM COATED ORAL
Qty: 90 TABLET | Refills: 3 | Status: SHIPPED | OUTPATIENT
Start: 2023-08-21

## 2023-08-25 ENCOUNTER — LAB (OUTPATIENT)
Dept: LAB | Facility: HOSPITAL | Age: 78
End: 2023-08-25
Payer: MEDICARE

## 2023-08-25 DIAGNOSIS — E78.5 DYSLIPIDEMIA: ICD-10-CM

## 2023-08-25 LAB
ALBUMIN SERPL-MCNC: 3.7 G/DL (ref 3.5–5.2)
ALBUMIN/GLOB SERPL: 1.2 G/DL
ALP SERPL-CCNC: 130 U/L (ref 39–117)
ALT SERPL W P-5'-P-CCNC: 11 U/L (ref 1–33)
ANION GAP SERPL CALCULATED.3IONS-SCNC: 9 MMOL/L (ref 5–15)
AST SERPL-CCNC: 14 U/L (ref 1–32)
BILIRUB SERPL-MCNC: 0.5 MG/DL (ref 0–1.2)
BUN SERPL-MCNC: 15 MG/DL (ref 8–23)
BUN/CREAT SERPL: 19.7 (ref 7–25)
CALCIUM SPEC-SCNC: 8.8 MG/DL (ref 8.6–10.5)
CHLORIDE SERPL-SCNC: 109 MMOL/L (ref 98–107)
CHOLEST SERPL-MCNC: 122 MG/DL (ref 0–200)
CO2 SERPL-SCNC: 25 MMOL/L (ref 22–29)
CREAT SERPL-MCNC: 0.76 MG/DL (ref 0.57–1)
EGFRCR SERPLBLD CKD-EPI 2021: 80.8 ML/MIN/1.73
GLOBULIN UR ELPH-MCNC: 3 GM/DL
GLUCOSE SERPL-MCNC: 110 MG/DL (ref 65–99)
HDLC SERPL-MCNC: 36 MG/DL (ref 40–60)
LDLC SERPL CALC-MCNC: 71 MG/DL (ref 0–100)
LDLC/HDLC SERPL: 1.97 {RATIO}
POTASSIUM SERPL-SCNC: 4.3 MMOL/L (ref 3.5–5.2)
PROT SERPL-MCNC: 6.7 G/DL (ref 6–8.5)
SODIUM SERPL-SCNC: 143 MMOL/L (ref 136–145)
TRIGL SERPL-MCNC: 75 MG/DL (ref 0–150)
VLDLC SERPL-MCNC: 15 MG/DL (ref 5–40)

## 2023-08-25 PROCEDURE — 80053 COMPREHEN METABOLIC PANEL: CPT

## 2023-08-25 PROCEDURE — 80061 LIPID PANEL: CPT

## 2023-08-25 PROCEDURE — 36415 COLL VENOUS BLD VENIPUNCTURE: CPT

## 2023-08-28 ENCOUNTER — TELEPHONE (OUTPATIENT)
Dept: CARDIOLOGY | Facility: CLINIC | Age: 78
End: 2023-08-28
Payer: MEDICARE

## 2023-08-28 NOTE — TELEPHONE ENCOUNTER
I spoke with Julee King and updated pt on results from provider.  Pt verbalized understanding and has no further questions at this time.    Thank you,    Laly Vanegas, RN  [place] [facility]  08/28/23 12:30 EDT

## 2023-09-07 ENCOUNTER — OFFICE VISIT (OUTPATIENT)
Dept: CARDIOLOGY | Facility: CLINIC | Age: 78
End: 2023-09-07
Payer: MEDICARE

## 2023-09-07 VITALS
SYSTOLIC BLOOD PRESSURE: 138 MMHG | HEIGHT: 61 IN | BODY MASS INDEX: 49.39 KG/M2 | WEIGHT: 261.6 LBS | HEART RATE: 59 BPM | DIASTOLIC BLOOD PRESSURE: 68 MMHG

## 2023-09-07 DIAGNOSIS — G47.33 OBSTRUCTIVE APNEA: ICD-10-CM

## 2023-09-07 DIAGNOSIS — R60.0 LOWER EXTREMITY EDEMA: ICD-10-CM

## 2023-09-07 DIAGNOSIS — E66.01 CLASS 3 SEVERE OBESITY DUE TO EXCESS CALORIES WITH SERIOUS COMORBIDITY AND BODY MASS INDEX (BMI) OF 45.0 TO 49.9 IN ADULT: ICD-10-CM

## 2023-09-07 DIAGNOSIS — R06.02 SHORTNESS OF BREATH: ICD-10-CM

## 2023-09-07 DIAGNOSIS — E78.5 DYSLIPIDEMIA: ICD-10-CM

## 2023-09-07 DIAGNOSIS — I25.10 CORONARY ARTERY DISEASE INVOLVING NATIVE CORONARY ARTERY OF NATIVE HEART WITHOUT ANGINA PECTORIS: Primary | ICD-10-CM

## 2023-09-07 DIAGNOSIS — R94.31 ABNORMAL EKG: ICD-10-CM

## 2023-09-07 DIAGNOSIS — I51.89 GRADE I DIASTOLIC DYSFUNCTION: ICD-10-CM

## 2023-09-07 DIAGNOSIS — I10 PRIMARY HYPERTENSION: ICD-10-CM

## 2023-09-07 NOTE — PROGRESS NOTES
Date of Office Visit: 2023  Encounter Provider: MANDIE Silverio  Place of Service: [facility] CARDIOLOGY  Patient Name: Julee King  :1945  Primary Cardiologist: Dr. Anastasiia Callahan     Chief Complaint   Patient presents with    Coronary Artery Disease    Annual Exam   :     HPI: Julee King is a 78 y.o. female who presents today for annual cardiac follow-up visit.  I reviewed her medical records.    She has been diagnosed with hypertension, hyperlipidemia, obstructive sleep apnea on CPAP, and obesity.    In 2016, she was diagnosed with coronary artery disease, underwent CABG, and EF was 33%.  EF normalized.    In 2019, she presented the [facility] ED with dyspnea and chest heaviness.  Echocardiogram showed normal EF 58% and mild LVH.  Cardiac PET scan was abnormal. She underwent cardiac catheterization which revealed multivessel coronary artery disease with 2 out of 4 patent grafts with medical treatment recommended. Dr. Almeida said if medical treatment fails we could consider PCI of the distal left main to help with perfusion of the distal left circumflex.     In 2022, she was experiencing dyspnea. Echocardiogram showed normal LVEF, moderate LVH, grade 1 diastolic dysfunction, no wall motion abnormalities. Cardiac PET scan showed small to medium size, mildly severe area of ischemia in the inferior wall.  Dr. Callahan reviewed with Dr. Almeida who did her last heart catheterization and they recommended medical treatment.  She was tried on furosemide, but was intolerant to the medication.    She presents today for her annual cardiac follow-up visit.  She reports chronic shortness of breath and lower extremity edema (unchanged).  She denies chest pain, palpitations, dizziness, syncope, or bleeding.  Her blood pressure and heart rate are normal.  Recent blood work stable.      Past Medical History:   Diagnosis Date    Bronchitis  05/2016    Cardiomyopathy     Carotid artery disease     16-49% stenosis of the left internal carotid 2016; carotid duplex 1/2017 - normal    Colon polyps     Coronary artery disease 01/2016    Non-STEMI and status post CABG    Dyslipidemia     GI bleed     Severely anemic and received a blood transfusion    Grade I diastolic dysfunction 02/08/2022    Per echocardiogram 2/2022    Hiatal hernia     Hyperlipidemia     Hypertension     Hypocalcemia     Hypokalemia     LVH (left ventricular hypertrophy) 02/08/2022    Mild to moderate per echo 2/2022    Non-STEMI (non-ST elevated myocardial infarction) 01/2016    Obesity     LOVE (obstructive sleep apnea)     moderate to severe; compliant with CPAP machine    PVC (premature ventricular contraction)        Past Surgical History:   Procedure Laterality Date    CARDIAC CATHETERIZATION N/A 02/11/2019    Procedure: Coronary angiography;  Surgeon: Cheryl Almeida MD;  Location:  [facility] CATH INVASIVE LOCATION;  Service: Cardiovascular    CARDIAC CATHETERIZATION N/A 02/11/2019    Procedure: Left Heart Cath;  Surgeon: Cheryl Almeida MD;  Location: [facility] CATH [facility]VASIVE LOC[facility];  Service: Cardiovascular    CARDIAC CATHETERIZATION N/A 02/11/2019    Procedure: Left ventriculography;  Surgeon: Cheryl Almeida MD;  Location:  [facility] CATH INVASIVE LOCATION;  Service: Cardiovascular    CARDIAC CATHETERIZATION  02/11/2019    Procedure: Saphenous Vein Graft;  Surgeon: Cheryl Almeida MD;  Location:  [facility] CATH INVASIVE LOCATION;  Service: Cardiovascular    CARDIAC CATHETERIZATION N/A 02/11/2019    Procedure: Native mammary injection;  Surgeon: Cheryl Almeida MD;  Location: [facility] CATH INVASIVE LOC[facility];  Service: Cardiovascular    CHOLECYSTECTOMY      COLONOSCOPY N/A 06/08/2016    Procedure: COLONOSCOPY to cecum with cold biopsy polypectomies;  Surgeon: Alvarado Rivera MD;  Location: [facility] ENDOSCOPY;  Service:     CORONARY ARTERY BYPASS GRAFT  02/28/2016    CABG x4  Dr Monroy;  CHOI to  "LAD, SVG to obtuse marginal 1, SVG to obtuse marginal 2, and SVG to LV branch    ENDOSCOPY N/A 06/08/2016    Procedure: ESOPHAGOGASTRODUODENOSCOPY with biopsy;  Surgeon: Alvarado Rivera MD;  Location: [facility] ENDOSCOPY;  Service:     ENTEROSCOPY SMALL BOWEL N/A 08/02/2017    Procedure: ENTEROSCOPY SMALL BOWEL WITH COLD BIOPSIES AND ARGON PLASMA COAGULATION TO  GASTIC ULCERS;  Surgeon: Alvarado Rivera MD;  Location: [facility] ENDOSCOPY;  Service:     HERNIA REPAIR      JOINT REPLACEMENT      vijaya knees    OTHER SURGICAL HISTORY      KNEE REPLACEMENT    OTHER SURGICAL HISTORY      TREATMENT OF ANKLE FRACTURE    OTHER SURGICAL HISTORY      TREATMENT FRACTURE OF THE HAND       Social History     Socioeconomic History    Marital status:    Tobacco Use    Smoking status: Never    Smokeless tobacco: Never   Vaping Use    Vaping Use: Never used   Substance and Sexual Activity    Alcohol use: Yes     Comment: WINE MAYBE EVERY COUPLE OF MONTHS    Drug use: Never    Sexual activity: Not Currently     Partners: Male     Birth control/protection: None       Family History   Problem Relation Age of Onset    Coronary artery disease Mother     Other Father     Coronary artery disease Father     Stroke Sister     Asthma Sister     Heart attack Sister     Heart attack Brother        The following portion of the patient's history were reviewed and updated as appropriate: past medical history, past surgical history, past social history, past family history, allergies, current medications, and problem list.    Review of Systems   Constitutional: Negative.   Cardiovascular:  Positive for dyspnea on exertion and leg swelling.   Respiratory:  Positive for cough and shortness of breath.    Hematologic/Lymphatic: Negative.    Neurological: Negative.      Allergies   Allergen Reactions    Codeine      \"makes me loopy\"    Iodinated Contrast Media      itching    Hctz [Hydrochlorothiazide] Nausea Only    Sulfa Antibiotics Unknown (See " "Comments)     NOT SURE         Current Outpatient Medications:     aspirin 81 MG tablet, Take 1 tablet by mouth Daily., Disp: , Rfl:     atorvastatin (LIPITOR) 40 MG tablet, TAKE 1 TABLET DAILY, Disp: 90 tablet, Rfl: 3    carvedilol (COREG) 25 MG tablet, TAKE 1 TABLET TWICE A DAY, Disp: 180 tablet, Rfl: 3    losartan (COZAAR) 100 MG tablet, TAKE 1 TABLET EVERY NIGHT, Disp: 90 tablet, Rfl: 3    NON FORMULARY, C PAP, Disp: , Rfl:          Objective:     Vitals:    09/07/23 1344   BP: 138/68   BP Location: Right arm   Patient Position: Sitting   Cuff Size: Adult   Pulse: 59   Weight: 119 kg (261 lb 9.6 oz)   Height: 154.9 cm (60.98\")     Body mass index is 49.45 kg/m².    PHYSICAL EXAM:    Vitals Reviewed.   General Appearance: No acute distress, well developed and well nourished. Obese.   Eyes: Glasses.   HENT: No hearing loss noted.    Respiratory: No signs of respiratory distress. Respiration rhythm and depth normal.  Clear to auscultation.   Cardiovascular:  Jugular Venous Pressure: Normal  Heart Rate and Rhythm: Normal, Heart Sounds: Normal S1 and S2. No S3 or S4 noted.  Murmurs: No murmurs noted. No rubs, thrills, or gallops.   Lower Extremities: Bilateral lower extremity edema; left greater than right.  Musculoskeletal: Normal movement of extremities.  Skin: General appearance normal.    Psychiatric: Patient alert and oriented to person, place, and time. Speech and behavior appropriate. Normal mood and affect.       ECG 12 Lead    Date/Time: 9/7/2023 1:45 PM  Performed by: Anabel Wood APRN  Authorized by: Anabel Wood APRN   Comparison: compared with previous ECG from 8/31/2022  Similar to previous ECG  Rhythm: sinus bradycardia  Rate: bradycardic  BPM: 59  Conduction: left anterior fascicular block  ST Segments: ST segments normal  T inversion: V1 and V2  QRS axis: normal  Other findings: non-specific ST-T wave changes    Clinical impression: non-specific ECG          Assessment:       Diagnosis Plan "   1. Coronary artery disease involving native coronary artery of native heart without angina pectoris        2. Abnormal EKG        3. Grade I diastolic dysfunction        4. Primary hypertension        5. Dyslipidemia        6. Obstructive apnea        7. Shortness of breath        8. Lower extremity edema        9. Class 3 severe obesity due to excess calories with serious comorbidity and body mass index (BMI) of 45.0 to 49.9 in adult               Plan:       1.  Coronary Artery Disease: Status post CABG in January 2016.  Heart cath showed 2 out of 4 patent bypass grafts in January 2019.  Dr. Almeida said if angina persisted that we could potentially intervene on the left main to left circumflex.  She has chronic T wave inversions that are unchanged.  Currently denies angina.  Continue aspirin and atorvastatin.    2.  Abnormal EKG: Chronic and unchanged.    3.  Grade 1 diastolic dysfunction: Well compensated today.  She has chronic dyspnea and edema which are unchanged.  Intolerant to furosemide.    4.  Hypertension: Blood pressure well controlled.    5.  Hyperlipidemia: Remains on atorvastatin.    6.  Obstructive sleep apnea: Compliant with CPAP.    7/8.  Chronic shortness of breath extremity edema.  I recommended leg elevation.    9.  Obesity. Body mass index is 49.45 kg/m².     10.  She is stable from a cardiac standpoint.  I recommended follow-up with Dr. Callahan in 1 year.    As always, it has been a pleasure to participate in your patient's care. Thank you.         Sincerely,         MANDIE Rodriguez  [facility] Cardiology      Dictated utilizing Dragon Dictation

## 2023-09-10 ENCOUNTER — HOSPITAL ENCOUNTER (INPATIENT)
Facility: HOSPITAL | Age: 78
LOS: 1 days | Discharge: HOME | DRG: 312 | End: 2023-09-12
Attending: EMERGENCY MEDICINE | Admitting: STUDENT IN AN ORGANIZED HEALTH CARE EDUCATION/TRAINING PROGRAM
Payer: MEDICARE

## 2023-09-10 ENCOUNTER — APPOINTMENT (OUTPATIENT)
Dept: CT IMAGING | Facility: HOSPITAL | Age: 78
DRG: 312 | End: 2023-09-10
Payer: MEDICARE

## 2023-09-10 ENCOUNTER — APPOINTMENT (OUTPATIENT)
Dept: GENERAL RADIOLOGY | Facility: HOSPITAL | Age: 78
DRG: 312 | End: 2023-09-10
Payer: MEDICARE

## 2023-09-10 DIAGNOSIS — R00.1 SINUS BRADYCARDIA: ICD-10-CM

## 2023-09-10 DIAGNOSIS — N39.0 ACUTE UTI: ICD-10-CM

## 2023-09-10 DIAGNOSIS — R55 SYNCOPE AND COLLAPSE: Primary | ICD-10-CM

## 2023-09-10 DIAGNOSIS — S00.93XA CONTUSION OF HEAD, UNSPECIFIED PART OF HEAD, INITIAL ENCOUNTER: ICD-10-CM

## 2023-09-10 PROBLEM — R73.9 HYPERGLYCEMIA: Status: ACTIVE | Noted: 2023-09-10

## 2023-09-10 PROBLEM — R82.90 ABNORMAL URINE: Status: ACTIVE | Noted: 2023-09-10

## 2023-09-10 LAB
ALBUMIN SERPL-MCNC: 3.8 G/DL (ref 3.5–5.2)
ALBUMIN/GLOB SERPL: 1.4 G/DL
ALP SERPL-CCNC: 125 U/L (ref 39–117)
ALT SERPL W P-5'-P-CCNC: 9 U/L (ref 1–33)
ANION GAP SERPL CALCULATED.3IONS-SCNC: 10 MMOL/L (ref 5–15)
AST SERPL-CCNC: 11 U/L (ref 1–32)
BACTERIA UR QL AUTO: ABNORMAL /HPF
BASOPHILS # BLD AUTO: 0.03 10*3/MM3 (ref 0–0.2)
BASOPHILS NFR BLD AUTO: 0.4 % (ref 0–1.5)
BILIRUB SERPL-MCNC: 0.8 MG/DL (ref 0–1.2)
BILIRUB UR QL STRIP: NEGATIVE
BUN SERPL-MCNC: 13 MG/DL (ref 8–23)
BUN/CREAT SERPL: 22 (ref 7–25)
CALCIUM SPEC-SCNC: 8.9 MG/DL (ref 8.6–10.5)
CHLORIDE SERPL-SCNC: 112 MMOL/L (ref 98–107)
CLARITY UR: ABNORMAL
CO2 SERPL-SCNC: 22 MMOL/L (ref 22–29)
COLOR UR: YELLOW
CREAT SERPL-MCNC: 0.59 MG/DL (ref 0.57–1)
DEPRECATED RDW RBC AUTO: 43.4 FL (ref 37–54)
EGFRCR SERPLBLD CKD-EPI 2021: 93 ML/MIN/1.73
EOSINOPHIL # BLD AUTO: 0.11 10*3/MM3 (ref 0–0.4)
EOSINOPHIL NFR BLD AUTO: 1.6 % (ref 0.3–6.2)
ERYTHROCYTE [DISTWIDTH] IN BLOOD BY AUTOMATED COUNT: 13.9 % (ref 12.3–15.4)
GLOBULIN UR ELPH-MCNC: 2.7 GM/DL
GLUCOSE SERPL-MCNC: 119 MG/DL (ref 65–99)
GLUCOSE UR STRIP-MCNC: NEGATIVE MG/DL
HBA1C MFR BLD: 6.1 % (ref 4.8–5.6)
HCT VFR BLD AUTO: 41.4 % (ref 34–46.6)
HGB BLD-MCNC: 13.4 G/DL (ref 12–15.9)
HGB UR QL STRIP.AUTO: ABNORMAL
HYALINE CASTS UR QL AUTO: ABNORMAL /LPF
IMM GRANULOCYTES # BLD AUTO: 0.03 10*3/MM3 (ref 0–0.05)
IMM GRANULOCYTES NFR BLD AUTO: 0.4 % (ref 0–0.5)
INR PPP: 1.09 (ref 0.9–1.1)
KETONES UR QL STRIP: NEGATIVE
LEUKOCYTE ESTERASE UR QL STRIP.AUTO: ABNORMAL
LIPASE SERPL-CCNC: 17 U/L (ref 13–60)
LYMPHOCYTES # BLD AUTO: 1 10*3/MM3 (ref 0.7–3.1)
LYMPHOCYTES NFR BLD AUTO: 14.7 % (ref 19.6–45.3)
MCH RBC QN AUTO: 27.6 PG (ref 26.6–33)
MCHC RBC AUTO-ENTMCNC: 32.4 G/DL (ref 31.5–35.7)
MCV RBC AUTO: 85.4 FL (ref 79–97)
MONOCYTES # BLD AUTO: 0.51 10*3/MM3 (ref 0.1–0.9)
MONOCYTES NFR BLD AUTO: 7.5 % (ref 5–12)
NEUTROPHILS NFR BLD AUTO: 5.11 10*3/MM3 (ref 1.7–7)
NEUTROPHILS NFR BLD AUTO: 75.4 % (ref 42.7–76)
NITRITE UR QL STRIP: POSITIVE
NRBC BLD AUTO-RTO: 0 /100 WBC (ref 0–0.2)
PH UR STRIP.AUTO: 7 [PH] (ref 5–8)
PLATELET # BLD AUTO: 160 10*3/MM3 (ref 140–450)
PMV BLD AUTO: 11.2 FL (ref 6–12)
POTASSIUM SERPL-SCNC: 3.7 MMOL/L (ref 3.5–5.2)
PROT SERPL-MCNC: 6.5 G/DL (ref 6–8.5)
PROT UR QL STRIP: NEGATIVE
PROTHROMBIN TIME: 14.2 SECONDS (ref 11.7–14.2)
QT INTERVAL: 489 MS
QTC INTERVAL: 437 MS
RBC # BLD AUTO: 4.85 10*6/MM3 (ref 3.77–5.28)
RBC # UR STRIP: ABNORMAL /HPF
REF LAB TEST METHOD: ABNORMAL
SODIUM SERPL-SCNC: 144 MMOL/L (ref 136–145)
SP GR UR STRIP: 1.01 (ref 1–1.03)
SQUAMOUS #/AREA URNS HPF: ABNORMAL /HPF
TROPONIN T SERPL HS-MCNC: 9 NG/L
UROBILINOGEN UR QL STRIP: ABNORMAL
WBC # UR STRIP: ABNORMAL /HPF
WBC NRBC COR # BLD: 6.79 10*3/MM3 (ref 3.4–10.8)

## 2023-09-10 PROCEDURE — 85610 PROTHROMBIN TIME: CPT | Performed by: EMERGENCY MEDICINE

## 2023-09-10 PROCEDURE — 70450 CT HEAD/BRAIN W/O DYE: CPT

## 2023-09-10 PROCEDURE — 36415 COLL VENOUS BLD VENIPUNCTURE: CPT

## 2023-09-10 PROCEDURE — 99285 EMERGENCY DEPT VISIT HI MDM: CPT

## 2023-09-10 PROCEDURE — 25010000002 ENOXAPARIN PER 10 MG: Performed by: INTERNAL MEDICINE

## 2023-09-10 PROCEDURE — 81001 URINALYSIS AUTO W/SCOPE: CPT | Performed by: EMERGENCY MEDICINE

## 2023-09-10 PROCEDURE — 83036 HEMOGLOBIN GLYCOSYLATED A1C: CPT | Performed by: INTERNAL MEDICINE

## 2023-09-10 PROCEDURE — 84484 ASSAY OF TROPONIN QUANT: CPT | Performed by: EMERGENCY MEDICINE

## 2023-09-10 PROCEDURE — 87186 SC STD MICRODIL/AGAR DIL: CPT | Performed by: EMERGENCY MEDICINE

## 2023-09-10 PROCEDURE — 80053 COMPREHEN METABOLIC PANEL: CPT | Performed by: EMERGENCY MEDICINE

## 2023-09-10 PROCEDURE — 83690 ASSAY OF LIPASE: CPT | Performed by: EMERGENCY MEDICINE

## 2023-09-10 PROCEDURE — 87086 URINE CULTURE/COLONY COUNT: CPT | Performed by: EMERGENCY MEDICINE

## 2023-09-10 PROCEDURE — 85025 COMPLETE CBC W/AUTO DIFF WBC: CPT | Performed by: EMERGENCY MEDICINE

## 2023-09-10 PROCEDURE — 87077 CULTURE AEROBIC IDENTIFY: CPT | Performed by: EMERGENCY MEDICINE

## 2023-09-10 PROCEDURE — G0378 HOSPITAL OBSERVATION PER HR: HCPCS

## 2023-09-10 PROCEDURE — 93010 ELECTROCARDIOGRAM REPORT: CPT | Performed by: INTERNAL MEDICINE

## 2023-09-10 PROCEDURE — 93005 ELECTROCARDIOGRAM TRACING: CPT

## 2023-09-10 PROCEDURE — 25010000002 ONDANSETRON PER 1 MG: Performed by: EMERGENCY MEDICINE

## 2023-09-10 PROCEDURE — 71045 X-RAY EXAM CHEST 1 VIEW: CPT

## 2023-09-10 RX ORDER — ACETAMINOPHEN 160 MG/5ML
650 SOLUTION ORAL EVERY 4 HOURS PRN
Status: DISCONTINUED | OUTPATIENT
Start: 2023-09-10 | End: 2023-09-12 | Stop reason: HOSPADM

## 2023-09-10 RX ORDER — ENOXAPARIN SODIUM 100 MG/ML
40 INJECTION SUBCUTANEOUS EVERY 24 HOURS
Status: DISCONTINUED | OUTPATIENT
Start: 2023-09-10 | End: 2023-09-11

## 2023-09-10 RX ORDER — LOSARTAN POTASSIUM 100 MG/1
100 TABLET ORAL NIGHTLY
Status: DISCONTINUED | OUTPATIENT
Start: 2023-09-10 | End: 2023-09-12 | Stop reason: HOSPADM

## 2023-09-10 RX ORDER — ASPIRIN 81 MG/1
81 TABLET ORAL DAILY
Status: DISCONTINUED | OUTPATIENT
Start: 2023-09-11 | End: 2023-09-12 | Stop reason: HOSPADM

## 2023-09-10 RX ORDER — ACETAMINOPHEN 650 MG/1
650 SUPPOSITORY RECTAL EVERY 4 HOURS PRN
Status: DISCONTINUED | OUTPATIENT
Start: 2023-09-10 | End: 2023-09-12 | Stop reason: HOSPADM

## 2023-09-10 RX ORDER — CARVEDILOL 12.5 MG/1
12.5 TABLET ORAL 2 TIMES DAILY WITH MEALS
Status: DISCONTINUED | OUTPATIENT
Start: 2023-09-10 | End: 2023-09-12 | Stop reason: HOSPADM

## 2023-09-10 RX ORDER — SODIUM CHLORIDE 0.9 % (FLUSH) 0.9 %
10 SYRINGE (ML) INJECTION EVERY 12 HOURS SCHEDULED
Status: DISCONTINUED | OUTPATIENT
Start: 2023-09-10 | End: 2023-09-12 | Stop reason: HOSPADM

## 2023-09-10 RX ORDER — ATORVASTATIN CALCIUM 20 MG/1
40 TABLET, FILM COATED ORAL NIGHTLY
Status: DISCONTINUED | OUTPATIENT
Start: 2023-09-10 | End: 2023-09-12 | Stop reason: HOSPADM

## 2023-09-10 RX ORDER — ONDANSETRON 4 MG/1
4 TABLET, FILM COATED ORAL EVERY 6 HOURS PRN
Status: DISCONTINUED | OUTPATIENT
Start: 2023-09-10 | End: 2023-09-12 | Stop reason: HOSPADM

## 2023-09-10 RX ORDER — BISACODYL 5 MG/1
5 TABLET, DELAYED RELEASE ORAL DAILY PRN
Status: DISCONTINUED | OUTPATIENT
Start: 2023-09-10 | End: 2023-09-12 | Stop reason: HOSPADM

## 2023-09-10 RX ORDER — NITROGLYCERIN 0.4 MG/1
0.4 TABLET SUBLINGUAL
Status: DISCONTINUED | OUTPATIENT
Start: 2023-09-10 | End: 2023-09-12 | Stop reason: HOSPADM

## 2023-09-10 RX ORDER — ONDANSETRON 2 MG/ML
4 INJECTION INTRAMUSCULAR; INTRAVENOUS ONCE
Status: COMPLETED | OUTPATIENT
Start: 2023-09-10 | End: 2023-09-10

## 2023-09-10 RX ORDER — SODIUM CHLORIDE 0.9 % (FLUSH) 0.9 %
10 SYRINGE (ML) INJECTION AS NEEDED
Status: DISCONTINUED | OUTPATIENT
Start: 2023-09-10 | End: 2023-09-12 | Stop reason: HOSPADM

## 2023-09-10 RX ORDER — SODIUM CHLORIDE 9 MG/ML
40 INJECTION, SOLUTION INTRAVENOUS AS NEEDED
Status: DISCONTINUED | OUTPATIENT
Start: 2023-09-10 | End: 2023-09-12 | Stop reason: HOSPADM

## 2023-09-10 RX ORDER — BISACODYL 10 MG
10 SUPPOSITORY, RECTAL RECTAL DAILY PRN
Status: DISCONTINUED | OUTPATIENT
Start: 2023-09-10 | End: 2023-09-12 | Stop reason: HOSPADM

## 2023-09-10 RX ORDER — ONDANSETRON 2 MG/ML
4 INJECTION INTRAMUSCULAR; INTRAVENOUS EVERY 6 HOURS PRN
Status: DISCONTINUED | OUTPATIENT
Start: 2023-09-10 | End: 2023-09-12 | Stop reason: HOSPADM

## 2023-09-10 RX ORDER — AMOXICILLIN 250 MG
2 CAPSULE ORAL 2 TIMES DAILY
Status: DISCONTINUED | OUTPATIENT
Start: 2023-09-10 | End: 2023-09-12 | Stop reason: HOSPADM

## 2023-09-10 RX ORDER — CALCIUM CARBONATE 500 MG/1
1 TABLET, CHEWABLE ORAL 3 TIMES DAILY PRN
Status: DISCONTINUED | OUTPATIENT
Start: 2023-09-10 | End: 2023-09-12 | Stop reason: HOSPADM

## 2023-09-10 RX ORDER — ACETAMINOPHEN 325 MG/1
650 TABLET ORAL EVERY 4 HOURS PRN
Status: DISCONTINUED | OUTPATIENT
Start: 2023-09-10 | End: 2023-09-12 | Stop reason: HOSPADM

## 2023-09-10 RX ORDER — POLYETHYLENE GLYCOL 3350 17 G/17G
17 POWDER, FOR SOLUTION ORAL DAILY PRN
Status: DISCONTINUED | OUTPATIENT
Start: 2023-09-10 | End: 2023-09-12 | Stop reason: HOSPADM

## 2023-09-10 RX ORDER — ENALAPRILAT 1.25 MG/ML
1.25 INJECTION INTRAVENOUS EVERY 6 HOURS
Status: DISCONTINUED | OUTPATIENT
Start: 2023-09-10 | End: 2023-09-10

## 2023-09-10 RX ORDER — AMLODIPINE BESYLATE 5 MG/1
5 TABLET ORAL
Status: DISCONTINUED | OUTPATIENT
Start: 2023-09-10 | End: 2023-09-12 | Stop reason: HOSPADM

## 2023-09-10 RX ADMIN — ONDANSETRON 4 MG: 2 INJECTION INTRAMUSCULAR; INTRAVENOUS at 12:35

## 2023-09-10 RX ADMIN — Medication 10 ML: at 20:28

## 2023-09-10 RX ADMIN — AMLODIPINE BESYLATE 5 MG: 5 TABLET ORAL at 17:46

## 2023-09-10 RX ADMIN — ATORVASTATIN CALCIUM 40 MG: 20 TABLET, FILM COATED ORAL at 20:28

## 2023-09-10 RX ADMIN — ENOXAPARIN SODIUM 40 MG: 100 INJECTION SUBCUTANEOUS at 17:46

## 2023-09-10 RX ADMIN — LOSARTAN POTASSIUM 100 MG: 100 TABLET, FILM COATED ORAL at 20:28

## 2023-09-10 RX ADMIN — CARVEDILOL 12.5 MG: 12.5 TABLET, FILM COATED ORAL at 17:46

## 2023-09-10 NOTE — ED NOTES
RN made two attempts to place an IV. Both attempts were unsuccessful. IV team pages, RN on stand by.

## 2023-09-10 NOTE — H&P
Patient Name:  Julee King  YOB: 1945  MRN:  [id number]  Admit Date:  9/10/2023  Patient Care Team:  Provider, No Known as PCP - General  Anastasiia Callahan MD as Cardiologist (Cardiology)  Alvarado Rivera MD as Consulting Physician (Gastroenterology)  Bianka Galaviz MD as Consulting Physician (Pulmonary Disease)        Chief Complaint   Patient presents with    Syncope   Prior to arrival    History of the present illness  A very pleasant 78 years old white female with a past history of coronary artery disease status post CABG/diastolic dysfunction/hypertension/obstructive sleep apnea/dyslipidemia/carotid peripheral vascular disease.  She presents to the emergency department after syncopal episode.  Patient reports that she was drinking Coke and felt as if the Coke is getting stuck and she felt short of breath and started belching and subsequently felt dizzy and developed syncopal episode for a few seconds.  She does report hitting her head.  There is no headache.  No seizures.  No urinary or bowel incontinence.  No double vision.  No loss of the vision.  No slurring of the speech.  She also has denied any chest pain/palpitation/cough/hemoptysis/PND orthopnea.  She reports old lower extremity edema and positive shortness of breath during that episode that has now resolved.  In the emergency department she was found to be hypertensive and bradycardic.  CBC was normal.  High-sensitivity troponin was negative.  Lipase was normal.  INR was normal.  CMP was normal except Ehlinger blood sugar of 119, chloride 112, alkaline phosphatase 125.  UA suggestive of UTI.  CT scan of the brain without contrast revealed no acute disease.  Chest x-ray without acute disease.  Patient was given IV Vasotec and patient was subsequently admitted.              Review of Systems     CNS.  As above.  Cardiovascular/respiratory.  As above.  .  No dysuria/no hematuria/no frequency or urgency/no urinary  incontinence.  GI.  Positive nausea.  No abdominal pain.  No nausea or vomiting.  Normal bowel habits without constipation/diarrhea/bleeding per rectum/melena.    Past Medical History:   Diagnosis Date    Bronchitis 05/2016    Cardiomyopathy     Carotid artery disease     16-49% stenosis of the left internal carotid 2016; carotid duplex 1/2017 - normal    Colon polyps     Coronary artery disease 01/2016    Non-STEMI and status post CABG    Dyslipidemia     GI bleed     Severely anemic and received a blood transfusion    Grade I diastolic dysfunction 02/08/2022    Per echocardiogram 2/2022    Hiatal hernia     Hyperlipidemia     Hypertension     Hypocalcemia     Hypokalemia     LVH (left ventricular hypertrophy) 02/08/2022    Mild to moderate per echo 2/2022    Non-STEMI (non-ST elevated myocardial infarction) 01/2016    Obesity     LOVE (obstructive sleep apnea)     moderate to severe; compliant with CPAP machine    PVC (premature ventricular contraction)      Past Surgical History:   Procedure Laterality Date    CARDIAC CATHETERIZATION N/A 02/11/2019    Procedure: Coronary angiography;  Surgeon: Cheryl Almeida MD;  Location: [facility] CATH INVASIVE LOCATION;  Service: Cardiovascular    CARDIAC CATHETERIZATION N/A 02/11/2019    Procedure: Left Heart Cath;  Surgeon: Chreyl Almeida MD;  Location: [facility] CATH INVASIVE LOCATION;  Service: Cardiovascular    CARDIAC CATHETERIZATION N/A 02/11/2019    Procedure: Left ventriculography;  Surgeon: Cheryl Almeida MD;  Location: [facility] CATH INVASIVE LOCATION;  Service: Cardiovascular    CARDIAC CATHETERIZATION  02/11/2019    Procedure: Saphenous Vein Graft;  Surgeon: Cheryl Almeida MD;  Location: [facility] CATH INVASIVE LOCATION;  Service: Cardiovascular    CARDIAC CATHETERIZATION N/A 02/11/2019    Procedure: Native mammary injection;  Surgeon: Cheryl Almeida MD;  Location: [facility] CATH INVASIVE LOCATION;  Service: Cardiovascular    CHOLECYSTECTOMY      COLONOSCOPY N/A  "06/08/2016    Procedure: COLONOSCOPY to cecum with cold biopsy polypectomies;  Surgeon: Alvarado Rivera MD;  Location:  [facility];  Service:     CORONARY ARTERY BYPASS GRAFT  02/28/2016    CABG x4  Dr Monroy;  CHOI to LAD, SVG to obtuse marginal 1, SVG to obtuse marginal 2, and SVG to LV branch    ENDOSCOPY N/A 06/08/2016    Procedure: ESOPHAGOGASTRODUODENOSCOPY with biopsy;  Surgeon: Alvarado Rivera MD;  Location:  [facility];  Service:     ENTEROSCOPY SMALL BOWEL N/A 08/02/2017    Procedure: ENTEROSCOPY SMALL BOWEL WITH COLD BIOPSIES AND ARGON PLASMA COAGULATION TO  GASTIC ULCERS;  Surgeon: Alvarado Rivera MD;  Location:  [facility];  Service:     HERNIA REPAIR      JOINT REPLACEMENT      vijaya knees    OTHER SURGICAL HISTORY      KNEE REPLACEMENT    OTHER SURGICAL HISTORY      TREATMENT OF ANKLE FRACTURE    OTHER SURGICAL HISTORY      TREATMENT FRACTURE OF THE HAND     Family History   Problem Relation Age of Onset    Coronary artery disease Mother     Other Father     Coronary artery disease Father     Stroke Sister     Asthma Sister     Heart attack Sister     Heart attack Brother      Social History     Tobacco Use    Smoking status: Never    Smokeless tobacco: Never   Vaping Use    Vaping Use: Never used   Substance Use Topics    Alcohol use: Yes     Comment: WINE MAYBE EVERY COUPLE OF MONTHS    Drug use: Never     No current facility-administered medications on file prior to encounter.     Current Outpatient Medications on File Prior to Encounter   Medication Sig Dispense Refill    aspirin 81 MG tablet Take 1 tablet by mouth Daily.      atorvastatin (LIPITOR) 40 MG tablet TAKE 1 TABLET DAILY 90 tablet 3    carvedilol (COREG) 25 MG tablet TAKE 1 TABLET TWICE A  tablet 3    losartan (COZAAR) 100 MG tablet TAKE 1 TABLET EVERY NIGHT 90 tablet 3    NON FORMULARY C PAP       Allergies   Allergen Reactions    Codeine      \"makes me loopy\"    Iodinated Contrast Media      itching    Hctz " [Hydrochlorothiazide] Nausea Only    Sulfa Antibiotics Unknown (See Comments)     NOT SURE       Objective    Objective     Vital Signs  Temp:  [97.5 °F (36.4 °C)-98.7 °F (37.1 °C)] 98.7 °F (37.1 °C)  Heart Rate:  [53-58] 53  Resp:  [16] 16  BP: (120-185)/(63-91) 131/63  SpO2:  [93 %-98 %] 93 %  on   ;   Device (Oxygen Therapy): room air  Body mass index is 49.32 kg/m².    Physical Exam  General.  Elderly female.  Obese.  Alert and oriented x3.  No apparent pain/distress/diaphoresis.  Normal mood and affect.  HEENT.  Scalp hematoma on the right parietal region.  Pupils equal round and reactive.  Status post bilateral cataract surgery.  No pallor or jaundice.  Moist mucous membrane.  Neck.  Supple.  No JVD.  No carotid bruit.  No lymphadenopathy or thyromegaly.  Cardiovascular.  Regular rate and rhythm with no gallops or murmurs.  Chest.  Poor bilateral air entry with no added sounds.  Abdomen.  Soft lax.  No tenderness.  No organomegaly.  No guarding or rebound.  Extremities.  Trace bilateral lower extremity edema.  No clubbing or cyanosis.  CNS.  Intact motor/sensory/cerebellar/cranial nerve systems.  Gait and Romberg not assessed.    Results Review:  I reviewed the patient's new clinical results.  I reviewed the patient's new imaging results and agree with the interpretation.  I reviewed the patient's other test results and agree with the interpretation  I personally viewed and interpreted the patient's EKG/Telemetry data  Discussed with ED provider.    Lab Results (last 24 hours)       Procedure Component Value Units Date/Time    CBC & Differential [[id number]]  (Abnormal) Collected: 09/10/23 1231    Specimen: Blood Updated: 09/10/23 1241    Narrative:      The following orders were created for panel order CBC & Differential.  Procedure                               Abnormality         Status                     ---------                               -----------         ------                     CBC Auto  Differential[[id number]]        Abnormal            Final result                 Please view results for these tests on the individual orders.    Comprehensive Metabolic Panel [[id number]]  (Abnormal) Collected: 09/10/23 1231    Specimen: Blood Updated: 09/10/23 1303     Glucose 119 mg/dL      BUN 13 mg/dL      Creatinine 0.59 mg/dL      Sodium 144 mmol/L      Potassium 3.7 mmol/L      Chloride 112 mmol/L      CO2 22.0 mmol/L      Calcium 8.9 mg/dL      Total Protein 6.5 g/dL      Albumin 3.8 g/dL      ALT (SGPT) 9 U/L      AST (SGOT) 11 U/L      Alkaline Phosphatase 125 U/L      Total Bilirubin 0.8 mg/dL      Globulin 2.7 gm/dL      A/G Ratio 1.4 g/dL      BUN/Creatinine Ratio 22.0     Anion Gap 10.0 mmol/L      eGFR 93.0 mL/min/1.73     Narrative:      GFR Normal >60  Chronic Kidney Disease <60  Kidney Failure <15    The GFR formula is only valid for adults with stable renal function between ages 18 and 70.    Protime-INR [[id number]]  (Normal) Collected: 09/10/23 1231    Specimen: Blood Updated: 09/10/23 1310     Protime 14.2 Seconds      INR 1.09    Lipase [[id number]]  (Normal) Collected: 09/10/23 1231    Specimen: Blood Updated: 09/10/23 1303     Lipase 17 U/L     Single High Sensitivity Troponin T [[id number]]  (Normal) Collected: 09/10/23 1231    Specimen: Blood Updated: 09/10/23 1303     HS Troponin T 9 ng/L     Narrative:      High Sensitive Troponin T Reference Range:  <10.0 ng/L- Negative Female for AMI  <15.0 ng/L- Negative Male for AMI  >=10 - Abnormal Female indicating possible myocardial injury.  >=15 - Abnormal Male indicating possible myocardial injury.   Clinicians would have to utilize clinical acumen, EKG, Troponin, and serial changes to determine if it is an Acute Myocardial Infarction or myocardial injury due to an underlying chronic condition.         CBC Auto Differential [[id number]]  (Abnormal) Collected: 09/10/23 1231    Specimen: Blood Updated: 09/10/23 1241     WBC 6.79 10*3/mm3      RBC 4.85  10*6/mm3      Hemoglobin 13.4 g/dL      Hematocrit 41.4 %      MCV 85.4 fL      MCH 27.6 pg      MCHC 32.4 g/dL      RDW 13.9 %      RDW-SD 43.4 fl      MPV 11.2 fL      Platelets 160 10*3/mm3      Neutrophil % 75.4 %      Lymphocyte % 14.7 %      Monocyte % 7.5 %      Eosinophil % 1.6 %      Basophil % 0.4 %      Immature Grans % 0.4 %      Neutrophils, Absolute 5.11 10*3/mm3      Lymphocytes, Absolute 1.00 10*3/mm3      Monocytes, Absolute 0.51 10*3/mm3      Eosinophils, Absolute 0.11 10*3/mm3      Basophils, Absolute 0.03 10*3/mm3      Immature Grans, Absolute 0.03 10*3/mm3      nRBC 0.0 /100 WBC     Urinalysis With Microscopic If Indicated (No Culture) - Urine, Clean Catch [[id number]]  (Abnormal) Collected: 09/10/23 1255    Specimen: Urine, Clean Catch Updated: 09/10/23 1324     Color, UA Yellow     Appearance, UA Cloudy     pH, UA 7.0     Specific Gravity, UA 1.015     Glucose, UA Negative     Ketones, UA Negative     Bilirubin, UA Negative     Blood, UA Trace     Protein, UA Negative     Leuk Esterase, UA Small (1+)     Nitrite, UA Positive     Urobilinogen, UA 1.0 E.U./dL    Urinalysis, Microscopic Only - Urine, Clean Catch [[id number]]  (Abnormal) Collected: 09/10/23 1255    Specimen: Urine, Clean Catch Updated: 09/10/23 1335     RBC, UA 0-2 /HPF      WBC, UA 31-50 /HPF      Bacteria, UA 4+ /HPF      Squamous Epithelial Cells, UA 0-2 /HPF      Hyaline Casts, UA 7-12 /LPF      Methodology Automated Microscopy    Urine Culture - Urine, Urine, Clean Catch [[id number]] Collected: 09/10/23 1255    Specimen: Urine, Clean Catch Updated: 09/10/23 1402            Imaging Results (Last 24 Hours)       Procedure Component Value Units Date/Time    CT Head Without Contrast [[id number]] Collected: 09/10/23 1335     Updated: 09/10/23 1343    Narrative:      CT HEAD WO CONTRAST-     INDICATIONS: Trauma     TECHNIQUE: Radiation dose reduction techniques were utilized, including  automated exposure control and exposure  modulation based on body size.  Noncontrast head CT     COMPARISON: 2/6/2016     FINDINGS:           No acute intracranial hemorrhage, midline shift or mass effect. No acute  territorial infarct is identified.     Moderate periventricular hypodensities suggest chronic small vessel  ischemic change in a patient this age.     Arterial calcifications are seen at the base of the brain.     Ventricles, cisterns, cerebral sulci are unremarkable for patient age.     The visualized paranasal sinuses, orbits, mastoid air cells are  unremarkable. Subcutaneous soft tissue swelling is apparent at the  posterior right scalp.             Impression:         No acute intracranial hemorrhage or hydrocephalus. Chronic changes of  the brain. If there is further clinical concern, MRI could be considered  for further evaluation.     This report was finalized on 9/10/2023 1:40 PM by Dr. Mario Alberto Schrader M.D.       XR Chest 1 View [[id number]] Collected: 09/10/23 1249     Updated: 09/10/23 1253    Narrative:      XR CHEST 1 VW-     HISTORY: 78-year-old female status post fall. Hit head.     FINDINGS: Limited by the apical lordotic projection and patient's body  habitus. There is no evidence for CHF or pneumothorax. Increased density  in both mid and lower lung fields is likely related to positioning.  Follow-up with PA and lateral views of the chest is recommended when the  patient is able.     This report was finalized on 9/10/2023 12:50 PM by Dr. Tosin Simon M.D.               Results for orders placed during the hospital encounter of 02/07/22    Adult Transthoracic Echo Complete W/ Cont if Necessary Per Protocol    Interpretation Summary  · Estimated right ventricular systolic pressure from tricuspid regurgitation is normal (<35 mmHg). Calculated right ventricular systolic pressure from tricuspid regurgitation is 22 mmHg.  · Estimated left ventricular EF = 60% Left ventricular systolic function is normal.  · Left ventricular wall  thickness is consistent with moderate concentric hypertrophy.  · Left ventricular diastolic function is consistent with (grade I) impaired relaxation.      ECG 12 Lead Syncope   Final Result   HEART RATE= 48  bpm   RR Interval= 1250  ms   MA Interval= 182  ms   P Horizontal Axis= -37  deg   P Front Axis= -2  deg   QRSD Interval= 108  ms   QT Interval= 489  ms   QTcB= 437  ms   QRS Axis= -31  deg   T Wave Axis= 14  deg   - ABNORMAL ECG -   Sinus bradycardia   Left ventricular hypertrophy   Abnormal T, consider ischemia, anterior leads   No change from previous tracing   Electronically Signed By: Josef Martinez ([facility]) 10-Sep-2023 15:37:29   Date and Time of Study: 2023-09-10 12:11:23               Assessment/Plan     Active Hospital Problems    Diagnosis  POA    **Syncope and collapse [R55]  Yes    Bradycardia [R00.1]  Yes    Abnormal urine [R82.90]  Yes    Hyperglycemia [R73.9]  Yes    Diastolic dysfunction [I51.89]  Yes    Essential hypertension [I10]  Yes    Hyperlipidemia [E78.5]  Yes    Obstructive sleep apnea [G47.33]  Yes    Coronary artery disease [I25.10]  Yes      Resolved Hospital Problems   No resolved problems to display.         Brief syncopal episode most likely secondary to vasovagal attack.  Patient however is bradycardic (look below).  CNS examination and cardiac examination are negative otherwise.  CT scan of the brain without contrast is negative.  Plan neuro check and observation.  Check orthostasis.  Check carotid ultrasound.  I do not see a need for MRI   History of coronary artery disease/hypertension/diastolic dysfunction.  Cardiac examination is unremarkable except bradycardia.  Last 2D echo on 2/22 revealing a normal ejection fraction with mild left ventricular hypertrophy and grade 1 diastolic dysfunction.  Troponin is negative.  EKG revealed sinus bradycardia with ST-T wave changes and the septal and lateral leads which are old.  There is no clinical evidence of angina or congestive heart  failure.  Blood pressure is on the high side.will continue losartan.   will decrease Coreg secondary to bradycardia.  Will add Norvasc.  Will mostly require a Holter at discharge.  We will consult cardiology.  I do not see a need to repeat an echo at this time unless cardiology believes so.  Abnormal UA.  Patient does not have symptoms of UTI.  I will stop antibiotics.  Obstructive sleep apnea.  Patient to resume her CPAP machine for  Hyperlipidemia/carotid peripheral vascular disease.  LDL is 71 on 8/23.  We will continue Lipitor and aspirin.  Will check carotid ultrasound.  Hyperglycemia.  Checking A1c.  VTE prophylaxis.  Lovenox.     Discussed my findings and plan of treatment with the patient/daughter/ER provider.        Code Status -full code.            Elizabeth Ross MD  [facility]  09/10/23  16:01 EDT

## 2023-09-10 NOTE — ED NOTES
"Pt arrives via EMS from home for syncope. Pt was drinking coca-cola and it went \"down the wrong pipe.\" Pt began coughing and had a syncopal episode where she fell backwards and hit her head. Pt denies blood thinners.     "

## 2023-09-10 NOTE — ED PROVIDER NOTES
EMERGENCY DEPARTMENT ENCOUNTER    Room Number:  N544/1  Date seen:  9/10/2023  PCP: Provider, No Known  Historian: Patient and EMS      HPI:  Chief Complaint: Syncope  Context: Julee King is a 78 y.o. female who presents to the ED by EMS from home for syncope.  The patient states she took a drink of Coke and felt like he got stuck in her esophagus and then the next thing she knew she woke up on the floor.  She states she did hit her head and does feel nauseated and dizzy right now.  The patient states she had not eaten any food and does not feel like there is anything stuck in her esophagus at this point.  She denies neck pain, chest pain, abdominal pain, vomiting, diarrhea, palpitations, shortness of breath or focal neurodeficit      PAST MEDICAL HISTORY  Active Ambulatory Problems     Diagnosis Date Noted    Adiposity 02/23/2016    Coronary artery disease involving native coronary artery of native heart without angina pectoris 02/23/2016    Obstructive apnea 04/07/2016    Obesity     Hypertension     Dyslipidemia     Shortness of breath 07/13/2017    LVH (left ventricular hypertrophy) 02/08/2022    Grade I diastolic dysfunction 02/08/2022     Resolved Ambulatory Problems     Diagnosis Date Noted    Chest pain 02/23/2016    BP (high blood pressure) 02/23/2016    Awareness of heartbeats 02/23/2016    Leg wound, left 03/16/2016    Sleep apnea     Hypokalemia     Hypocalcemia     S/P CABG (coronary artery bypass graft) 01/16/2018    Abnormal nuclear stress test 02/05/2019    Dyspnea on exertion 02/05/2019     Past Medical History:   Diagnosis Date    Bronchitis 05/2016    Cardiomyopathy     Carotid artery disease     Colon polyps     Coronary artery disease 01/2016    GI bleed     Hiatal hernia     Hyperlipidemia     Non-STEMI (non-ST elevated myocardial infarction) 01/2016    LOVE (obstructive sleep apnea)     PVC (premature ventricular contraction)          REVIEW OF SYSTEMS  All systems reviewed and negative  except for those discussed in HPI.       PAST SURGICAL HISTORY  Past Surgical History:   Procedure Laterality Date    CARDIAC CATHETERIZATION N/A 02/11/2019    Procedure: Coronary angiography;  Surgeon: Cheryl Almeida MD;  Location:  [facility];  Service: Cardiovascular    CARDIAC CATHETERIZATION N/A 02/11/2019    Procedure: Left Heart Cath;  Surgeon: Cheryl Almeida MD;  Location:  [facility];  Service: Cardiovascular    CARDIAC CATHETERIZATION N/A 02/11/2019    Procedure: Left ventriculography;  Surgeon: Cheryl Almeida MD;  Location:  [facility];  Service: Cardiovascular    CARDIAC CATHETERIZATION  02/11/2019    Procedure: Saphenous Vein Graft;  Surgeon: Cheryl Almeida MD;  Location:  [facility];  Service: Cardiovascular    CARDIAC CATHETERIZATION N/A 02/11/2019    Procedure: Native mammary injection;  Surgeon: Cheryl Almeida MD;  Location: [facility] CATH INVASIVE LOCATION;  Service: Cardiovascular    CHOLECYSTECTOMY      COLONOSCOPY N/A 06/08/2016    Procedure: COLONOSCOPY to cecum with cold biopsy polypectomies;  Surgeon: Alvarado Rivera MD;  Location: [facility] ENDOSCOPY;  Service:     CORONARY ARTERY BYPASS GRAFT  02/28/2016    CABG x4  Dr Monroy;  CHOI to LAD, SVG to obtuse marginal 1, SVG to obtuse marginal 2, and SVG to LV branch    ENDOSCOPY N/A 06/08/2016    Procedure: ESOPHAGOGASTRODUODENOSCOPY with biopsy;  Surgeon: Alvarado Rivera MD;  Location: [facility] ENDOSCOPY;  Service:     ENTEROSCOPY SMALL BOWEL N/A 08/02/2017    Procedure: ENTEROSCOPY SMALL BOWEL WITH COLD BIOPSIES AND ARGON PLASMA COAGULATION TO  GASTIC ULCERS;  Surgeon: Alvarado Rivera MD;  Location: [facility] ENDOSCOPY;  Service:     HERNIA REPAIR      JOINT REPLACEMENT      vijaya knees    OTHER SURGICAL HISTORY      KNEE REPLACEMENT    OTHER SURGICAL HISTORY      TREATMENT OF ANKLE FRACTURE    OTHER SURGICAL HISTORY      TREATMENT FRACTURE OF THE HAND         FAMILY  HISTORY  Family History   Problem Relation Age of Onset    Coronary artery disease Mother     Other Father     Coronary artery disease Father     Stroke Sister     Asthma Sister     Heart attack Sister     Heart attack Brother          SOCIAL HISTORY  Social History     Socioeconomic History    Marital status:    Tobacco Use    Smoking status: Never    Smokeless tobacco: Never   Vaping Use    Vaping Use: Never used   Substance and Sexual Activity    Alcohol use: Yes     Comment: WINE MAYBE EVERY COUPLE OF MONTHS    Drug use: Never    Sexual activity: Not Currently     Partners: Male     Birth control/protection: None         ALLERGIES  Codeine, Iodinated contrast media, Hctz [hydrochlorothiazide], and Sulfa antibiotics      PHYSICAL EXAM  ED Triage Vitals [09/10/23 1203]   Temp Heart Rate Resp BP SpO2   97.5 °F (36.4 °C) 58 16 120/76 98 %      Temp src Heart Rate Source Patient Position BP Location FiO2 (%)   Tympanic Monitor -- -- --       Physical Exam      GENERAL: 78-year-old female in no acute distress  HENT: Right parietal tenderness to palpation: nares patent: Neck supple with no C-spine tenderness to palpation and full range of motion of her neck without pain  EYES: no scleral icterus  CV: regular rhythm, normal rate  RESPIRATORY: normal effort  ABDOMEN: soft, NTND: Bowel sounds positive  MUSCULOSKELETAL: no deformity  NEURO: alert with nonfocal neuro exam  PSYCH:  calm, cooperative  SKIN: warm, dry    Vital signs and nursing notes reviewed.      LAB RESULTS  Recent Results (from the past 24 hour(s))   ECG 12 Lead Syncope    Collection Time: 09/10/23 12:11 PM   Result Value Ref Range    QT Interval 489 ms    QTC Interval 437 ms   Comprehensive Metabolic Panel    Collection Time: 09/10/23 12:31 PM    Specimen: Blood   Result Value Ref Range    Glucose 119 (H) 65 - 99 mg/dL    BUN 13 8 - 23 mg/dL    Creatinine 0.59 0.57 - 1.00 mg/dL    Sodium 144 136 - 145 mmol/L    Potassium 3.7 3.5 - 5.2 mmol/L     Chloride 112 (H) 98 - 107 mmol/L    CO2 22.0 22.0 - 29.0 mmol/L    Calcium 8.9 8.6 - 10.5 mg/dL    Total Protein 6.5 6.0 - 8.5 g/dL    Albumin 3.8 3.5 - 5.2 g/dL    ALT (SGPT) 9 1 - 33 U/L    AST (SGOT) 11 1 - 32 U/L    Alkaline Phosphatase 125 (H) 39 - 117 U/L    Total Bilirubin 0.8 0.0 - 1.2 mg/dL    Globulin 2.7 gm/dL    A/G Ratio 1.4 g/dL    BUN/Creatinine Ratio 22.0 7.0 - 25.0    Anion Gap 10.0 5.0 - 15.0 mmol/L    eGFR 93.0 >60.0 mL/min/1.73   Protime-INR    Collection Time: 09/10/23 12:31 PM    Specimen: Blood   Result Value Ref Range    Protime 14.2 11.7 - 14.2 Seconds    INR 1.09 0.90 - 1.10   Lipase    Collection Time: 09/10/23 12:31 PM    Specimen: Blood   Result Value Ref Range    Lipase 17 13 - 60 U/L   Single High Sensitivity Troponin T    Collection Time: 09/10/23 12:31 PM    Specimen: Blood   Result Value Ref Range    HS Troponin T 9 <10 ng/L   CBC Auto Differential    Collection Time: 09/10/23 12:31 PM    Specimen: Blood   Result Value Ref Range    WBC 6.79 3.40 - 10.80 10*3/mm3    RBC 4.85 3.77 - 5.28 10*6/mm3    Hemoglobin 13.4 12.0 - 15.9 g/dL    Hematocrit 41.4 34.0 - 46.6 %    MCV 85.4 79.0 - 97.0 fL    MCH 27.6 26.6 - 33.0 pg    MCHC 32.4 31.5 - 35.7 g/dL    RDW 13.9 12.3 - 15.4 %    RDW-SD 43.4 37.0 - 54.0 fl    MPV 11.2 6.0 - 12.0 fL    Platelets 160 140 - 450 10*3/mm3    Neutrophil % 75.4 42.7 - 76.0 %    Lymphocyte % 14.7 (L) 19.6 - 45.3 %    Monocyte % 7.5 5.0 - 12.0 %    Eosinophil % 1.6 0.3 - 6.2 %    Basophil % 0.4 0.0 - 1.5 %    Immature Grans % 0.4 0.0 - 0.5 %    Neutrophils, Absolute 5.11 1.70 - 7.00 10*3/mm3    Lymphocytes, Absolute 1.00 0.70 - 3.10 10*3/mm3    Monocytes, Absolute 0.51 0.10 - 0.90 10*3/mm3    Eosinophils, Absolute 0.11 0.00 - 0.40 10*3/mm3    Basophils, Absolute 0.03 0.00 - 0.20 10*3/mm3    Immature Grans, Absolute 0.03 0.00 - 0.05 10*3/mm3    nRBC 0.0 0.0 - 0.2 /100 WBC   Urinalysis With Microscopic If Indicated (No Culture) - Urine, Clean Catch    Collection  Time: 09/10/23 12:55 PM    Specimen: Urine, Clean Catch   Result Value Ref Range    Color, UA Yellow Yellow, Straw    Appearance, UA Cloudy (A) Clear    pH, UA 7.0 5.0 - 8.0    Specific Gravity, UA 1.015 1.005 - 1.030    Glucose, UA Negative Negative    Ketones, UA Negative Negative    Bilirubin, UA Negative Negative    Blood, UA Trace (A) Negative    Protein, UA Negative Negative    Leuk Esterase, UA Small (1+) (A) Negative    Nitrite, UA Positive (A) Negative    Urobilinogen, UA 1.0 E.U./dL 0.2 - 1.0 E.U./dL   Urinalysis, Microscopic Only - Urine, Clean Catch    Collection Time: 09/10/23 12:55 PM    Specimen: Urine, Clean Catch   Result Value Ref Range    RBC, UA 0-2 None Seen, 0-2 /HPF    WBC, UA 31-50 (A) None Seen, 0-2 /HPF    Bacteria, UA 4+ (A) None Seen /HPF    Squamous Epithelial Cells, UA 0-2 None Seen, 0-2 /HPF    Hyaline Casts, UA 7-12 None Seen /LPF    Methodology Automated Microscopy        Ordered the above labs and reviewed the results.        RADIOLOGY  CT Head Without Contrast    Result Date: 9/10/2023  CT HEAD WO CONTRAST-  INDICATIONS: Trauma  TECHNIQUE: Radiation dose reduction techniques were utilized, including automated exposure control and exposure modulation based on body size. Noncontrast head CT  COMPARISON: 2/6/2016  FINDINGS:    No acute intracranial hemorrhage, midline shift or mass effect. No acute territorial infarct is identified.  Moderate periventricular hypodensities suggest chronic small vessel ischemic change in a patient this age.  Arterial calcifications are seen at the base of the brain.  Ventricles, cisterns, cerebral sulci are unremarkable for patient age.  The visualized paranasal sinuses, orbits, mastoid air cells are unremarkable. Subcutaneous soft tissue swelling is apparent at the posterior right scalp.         No acute intracranial hemorrhage or hydrocephalus. Chronic changes of the brain. If there is further clinical concern, MRI could be considered for further  evaluation.  This report was finalized on 9/10/2023 1:40 PM by Dr. Mario Alberto Schrader M.D.      XR Chest 1 View    Result Date: 9/10/2023  XR CHEST 1 VW-  HISTORY: 78-year-old female status post fall. Hit head.  FINDINGS: Limited by the apical lordotic projection and patient's body habitus. There is no evidence for CHF or pneumothorax. Increased density in both mid and lower lung fields is likely related to positioning. Follow-up with PA and lateral views of the chest is recommended when the patient is able.  This report was finalized on 9/10/2023 12:50 PM by Dr. Tosin Simon M.D.       Ordered the above noted radiological studies. Reviewed by me in PACS.            PROCEDURES  Procedures          MEDICATIONS GIVEN IN ER  Medications   sodium chloride 0.9 % flush 10 mL (has no administration in time range)   cefTRIAXone (ROCEPHIN) 2,000 mg in sodium chloride 0.9 % 100 mL IVPB-VTB (has no administration in time range)   enalaprilat (VASOTEC) injection 1.25 mg (1.25 mg Intravenous Not Given 9/10/23 1511)   ondansetron (ZOFRAN) injection 4 mg (4 mg Intravenous Given 9/10/23 1235)             MEDICAL DECISION MAKING, PROGRESS, and CONSULTS    All labs have been independently reviewed by me.  All radiology studies have been reviewed by me and I have also reviewed the radiology report.   EKG's independently viewed and interpreted by me.  Discussion below represents my analysis of pertinent findings related to patient's condition, differential diagnosis, treatment plan and final disposition.      Additional sources:  - Discussed/ obtained information from independent historians: EMS states that the patient had positive loss of consciousness    - External (non-ED) record review: I reviewed the patient's office visit with cardiology from 2 days ago where she was seen and had a sinus bradycardia at 59 on her EKG.    - Chronic or social conditions impacting care: Patient lives with her .    - Shared decision making:  After shared decision-making discussion between myself, the patient and her daughter we agree she needs to admitted to the hospital for further evaluation and care      Orders placed during this visit:  Orders Placed This Encounter   Procedures    Urine Culture - Urine,    XR Chest 1 View    CT Head Without Contrast    Comprehensive Metabolic Panel    Protime-INR    Lipase    Urinalysis With Microscopic If Indicated (No Culture) - Urine, Clean Catch    Single High Sensitivity Troponin T    CBC Auto Differential    Urinalysis, Microscopic Only - Urine, Clean Catch    Monitor Blood Pressure    Pulse Oximetry, Continuous    [facility] (on-call MD unless specified) Details    ECG 12 Lead Syncope    Insert Peripheral IV    Initiate Observation Status    CBC & Differential         Differential diagnosis:  My differential diagnosis includes but is not limited to seizure, syncope, vasovagal episode, choking episode, arrhythmia, sepsis, intracranial hemorrhage or skull fracture      Independent interpretation of labs, radiology studies, and discussions with consultants:  ED Course as of 09/10/23 1537   Sun Sep 10, 2023   1223 I will check EKG, x-ray, labs and head CT for further evaluation of the patient's syncope.  Of note she is bradycardic into the 40s and 50s at this time but it appears to be sinus. [GP]   1227 EKG    EKG time: 1211  Rhythm/Rate: Sinus bradycardia at 48  No Acute Ischemia  Non-Specific ST-T changes    Similar compared to prior on 9/7/23    Interpreted Contemporaneously by me.  Independently viewed by me     [GP]   1345 Management interpretation patient's chest x-ray is chronic changes but negative acute [GP]   1345 My independent rotation the patient's head CT is chronic changes but negative acute. [GP]   1346 The patient does have 31-50 WBCs in her urine with 4+ bacteria.  I will send off a urine culture and give her Rocephin.  The patient remained bradycardic in the 50s and occasionally in the 40s in the  emergency room.  She remains hypertensive with a blood pressure of 180/100.  I will give her dose of Vasotec.  I advised the patient that she needs to be admitted to the hospital for her syncope, bradycardia and UTI.  The patient and family understand and agree with the plan. [GP]   5296 I discussed the case with Dr. Ross from [facility].  He is aware of the patient's syncopal episode, bradycardia and UTI.  He will admit her to a telemetry bed in observation status for further evaluation and care. [GP]      ED Course User Index  [GP] Alfonso Avila MD               DIAGNOSIS  Final diagnoses:   Syncope and collapse   Sinus bradycardia   Acute UTI   Contusion of head, unspecified part of head, initial encounter         DISPOSITION  ADMISSION    Discussed treatment plan and reason for admission with pt/family and admitting physician.  Pt/family voiced understanding of the plan for admission for further testing/treatment as needed.            Latest Documented Vital Signs:  As of 15:37 EDT  BP- 131/63 HR- 53 Temp- 98.7 °F (37.1 °C) (Oral) O2 sat- 93%--      --------------------  Please note that portions of this were completed with a voice recognition program.       Note Disclaimer: At [facility], we believe that sharing information builds trust and better relationships. You are receiving this note because you are receiving care at [facility] or recently visited. It is possible you will see health information before a provider has talked with you about it. This kind of information can be easy to misunderstand. To help you fully understand what it means for your health, we urge you to discuss this note with your provider.             Alfonso Avila MD  09/10/23 [id number]

## 2023-09-10 NOTE — PROGRESS NOTES
Patient Name:  Julee King  YOB: 1945  MRN:  [id number]  Admit Date:  9/10/2023  Patient Care Team:  Provider, No Known as PCP - General  Anastasiia Callahan MD as Cardiologist (Cardiology)  Alvarado Rivera MD as Consulting Physician (Gastroenterology)  Bianka Galaviz MD as Consulting Physician (Pulmonary Disease)        Chief Complaint   Patient presents with    Syncope   Prior to arrival.    History Present Illness     A very pleasant 78 years old white female with a past history of coronary artery disease status post CABG/diastolic dysfunction/hypertension/obstructive sleep apnea/dyslipidemia/carotid peripheral vascular disease.  She presents to the emergency department after syncopal episode.  Patient reports that she was drinking Coke and felt as if the Coke is getting stuck and she felt short of breath and started belching and subsequently felt dizzy and developed syncopal episode for a few seconds.  She does report hitting her head.  There is no headache.  No seizures.  No urinary or bowel incontinence.  No double vision.  No loss of the vision.  No slurring of the speech.  She also has denied any chest pain/palpitation/cough/hemoptysis/PND orthopnea.  She reports old lower extremity edema and positive shortness of breath during that episode that has now resolved.  In the emergency department she was found to be hypertensive and bradycardic.  CBC was normal.  High-sensitivity troponin was negative.  Lipase was normal.  INR was normal.  CMP was normal except Ehlinger blood sugar of 119, chloride 112, alkaline phosphatase 125.  UA suggestive of UTI.  CT scan of the brain without contrast revealed no acute disease.  Chest x-ray without acute disease.  Patient was given IV Vasotec and patient was subsequently admitted.        Review of Systems   CNS.  As above.  Cardiovascular/respiratory.  As above.  .  No dysuria/no hematuria/no frequency or urgency/no urinary incontinence.  GI.   Positive nausea.  No abdominal pain.  No nausea or vomiting.  Normal bowel habits without constipation/diarrhea/bleeding per rectum/melena.    Personal History     Past Medical History:   Diagnosis Date    Bronchitis 05/2016    Cardiomyopathy     Carotid artery disease     16-49% stenosis of the left internal carotid 2016; carotid duplex 1/2017 - normal    Colon polyps     Coronary artery disease 01/2016    Non-STEMI and status post CABG    Dyslipidemia     GI bleed     Severely anemic and received a blood transfusion    Grade I diastolic dysfunction 02/08/2022    Per echocardiogram 2/2022    Hiatal hernia     Hyperlipidemia     Hypertension     Hypocalcemia     Hypokalemia     LVH (left ventricular hypertrophy) 02/08/2022    Mild to moderate per echo 2/2022    Non-STEMI (non-ST elevated myocardial infarction) 01/2016    Obesity     LOVE (obstructive sleep apnea)     moderate to severe; compliant with CPAP machine    PVC (premature ventricular contraction)      Past Surgical History:   Procedure Laterality Date    CARDIAC CATHETERIZATION N/A 02/11/2019    Procedure: Coronary angiography;  Surgeon: Cheryl Almeida MD;  Location: [facility] CATH INVASIVE LOCATION;  Service: Cardiovascular    CARDIAC CATHETERIZATION N/A 02/11/2019    Procedure: Left Heart Cath;  Surgeon: Cheryl Almeida MD;  Location: [facility] CATH INVASIVE LOCATION;  Service: Cardiovascular    CARDIAC CATHETERIZATION N/A 02/11/2019    Procedure: Left ventriculography;  Surgeon: Cheryl Almeida MD;  Location: [facility] CATH INVASIVE LOCATION;  Service: Cardiovascular    CARDIAC CATHETERIZATION  02/11/2019    Procedure: Saphenous Vein Graft;  Surgeon: Cheryl Almeida MD;  Location: [facility] CATH INVASIVE LOCATION;  Service: Cardiovascular    CARDIAC CATHETERIZATION N/A 02/11/2019    Procedure: Native mammary injection;  Surgeon: Cheryl Almeida MD;  Location: [facility] CATH INVASIVE LOCATION;  Service: Cardiovascular    CHOLECYSTECTOMY      COLONOSCOPY N/A  "06/08/2016    Procedure: COLONOSCOPY to cecum with cold biopsy polypectomies;  Surgeon: Alvarado Rivera MD;  Location:  [facility];  Service:     CORONARY ARTERY BYPASS GRAFT  02/28/2016    CABG x4  Dr Monroy;  CHOI to LAD, SVG to obtuse marginal 1, SVG to obtuse marginal 2, and SVG to LV branch    ENDOSCOPY N/A 06/08/2016    Procedure: ESOPHAGOGASTRODUODENOSCOPY with biopsy;  Surgeon: Alvarado Rivera MD;  Location:  [facility];  Service:     ENTEROSCOPY SMALL BOWEL N/A 08/02/2017    Procedure: ENTEROSCOPY SMALL BOWEL WITH COLD BIOPSIES AND ARGON PLASMA COAGULATION TO  GASTIC ULCERS;  Surgeon: Alvarado Rivera MD;  Location:  [facility];  Service:     HERNIA REPAIR      JOINT REPLACEMENT      vijaya knees    OTHER SURGICAL HISTORY      KNEE REPLACEMENT    OTHER SURGICAL HISTORY      TREATMENT OF ANKLE FRACTURE    OTHER SURGICAL HISTORY      TREATMENT FRACTURE OF THE HAND     Family History   Problem Relation Age of Onset    Coronary artery disease Mother     Other Father     Coronary artery disease Father     Stroke Sister     Asthma Sister     Heart attack Sister     Heart attack Brother      Social History     Tobacco Use    Smoking status: Never    Smokeless tobacco: Never   Vaping Use    Vaping Use: Never used   Substance Use Topics    Alcohol use: Yes     Comment: WINE MAYBE EVERY COUPLE OF MONTHS    Drug use: Never     No current facility-administered medications on file prior to encounter.     Current Outpatient Medications on File Prior to Encounter   Medication Sig Dispense Refill    aspirin 81 MG tablet Take 1 tablet by mouth Daily.      atorvastatin (LIPITOR) 40 MG tablet TAKE 1 TABLET DAILY 90 tablet 3    carvedilol (COREG) 25 MG tablet TAKE 1 TABLET TWICE A  tablet 3    losartan (COZAAR) 100 MG tablet TAKE 1 TABLET EVERY NIGHT 90 tablet 3    NON FORMULARY C PAP       Allergies   Allergen Reactions    Codeine      \"makes me loopy\"    Iodinated Contrast Media      itching    Hctz " [Hydrochlorothiazide] Nausea Only    Sulfa Antibiotics Unknown (See Comments)     NOT SURE       Objective    Objective     Vital Signs  Temp:  [97.5 °F (36.4 °C)-98.7 °F (37.1 °C)] 98.7 °F (37.1 °C)  Heart Rate:  [53-58] 53  Resp:  [16] 16  BP: (120-185)/(63-91) 131/63  SpO2:  [93 %-98 %] 93 %  on   ;   Device (Oxygen Therapy): room air  Body mass index is 49.32 kg/m².    Physical Exam  General.  Elderly female.  Obese.  Alert and oriented x3.  No apparent pain/distress/diaphoresis.  Normal mood and affect.  HEENT.  Scalp hematoma on the right parietal region.  Pupils equal round and reactive.  Status post bilateral cataract surgery.  No pallor or jaundice.  Moist mucous membrane.  Neck.  Supple.  No JVD.  No carotid bruit.  No lymphadenopathy or thyromegaly.  Cardiovascular.  Regular rate and rhythm with no gallops or murmurs.  Chest.  Poor bilateral air entry with no added sounds.  Abdomen.  Soft lax.  No tenderness.  No organomegaly.  No guarding or rebound.  Extremities.  Trace bilateral lower extremity edema.  No clubbing or cyanosis.  CNS.  Intact motor/sensory/cerebellar/cranial nerve systems.  Gait and Romberg not assessed.      Results Review:  I reviewed the patient's new clinical results.  I reviewed the patient's new imaging results and agree with the interpretation.  I reviewed the patient's other test results and agree with the interpretation  I personally viewed and interpreted the patient's EKG/Telemetry data  Discussed with ED provider.    Lab Results (last 24 hours)       Procedure Component Value Units Date/Time    CBC & Differential [[id number]]  (Abnormal) Collected: 09/10/23 1231    Specimen: Blood Updated: 09/10/23 1241    Narrative:      The following orders were created for panel order CBC & Differential.  Procedure                               Abnormality         Status                     ---------                               -----------         ------                     CBC Auto  Differential[[id number]]        Abnormal            Final result                 Please view results for these tests on the individual orders.    Comprehensive Metabolic Panel [[id number]]  (Abnormal) Collected: 09/10/23 1231    Specimen: Blood Updated: 09/10/23 1303     Glucose 119 mg/dL      BUN 13 mg/dL      Creatinine 0.59 mg/dL      Sodium 144 mmol/L      Potassium 3.7 mmol/L      Chloride 112 mmol/L      CO2 22.0 mmol/L      Calcium 8.9 mg/dL      Total Protein 6.5 g/dL      Albumin 3.8 g/dL      ALT (SGPT) 9 U/L      AST (SGOT) 11 U/L      Alkaline Phosphatase 125 U/L      Total Bilirubin 0.8 mg/dL      Globulin 2.7 gm/dL      A/G Ratio 1.4 g/dL      BUN/Creatinine Ratio 22.0     Anion Gap 10.0 mmol/L      eGFR 93.0 mL/min/1.73     Narrative:      GFR Normal >60  Chronic Kidney Disease <60  Kidney Failure <15    The GFR formula is only valid for adults with stable renal function between ages 18 and 70.    Protime-INR [[id number]]  (Normal) Collected: 09/10/23 1231    Specimen: Blood Updated: 09/10/23 1310     Protime 14.2 Seconds      INR 1.09    Lipase [[id number]]  (Normal) Collected: 09/10/23 1231    Specimen: Blood Updated: 09/10/23 1303     Lipase 17 U/L     Single High Sensitivity Troponin T [[id number]]  (Normal) Collected: 09/10/23 1231    Specimen: Blood Updated: 09/10/23 1303     HS Troponin T 9 ng/L     Narrative:      High Sensitive Troponin T Reference Range:  <10.0 ng/L- Negative Female for AMI  <15.0 ng/L- Negative Male for AMI  >=10 - Abnormal Female indicating possible myocardial injury.  >=15 - Abnormal Male indicating possible myocardial injury.   Clinicians would have to utilize clinical acumen, EKG, Troponin, and serial changes to determine if it is an Acute Myocardial Infarction or myocardial injury due to an underlying chronic condition.         CBC Auto Differential [[id number]]  (Abnormal) Collected: 09/10/23 1231    Specimen: Blood Updated: 09/10/23 1241     WBC 6.79 10*3/mm3      RBC 4.85  10*6/mm3      Hemoglobin 13.4 g/dL      Hematocrit 41.4 %      MCV 85.4 fL      MCH 27.6 pg      MCHC 32.4 g/dL      RDW 13.9 %      RDW-SD 43.4 fl      MPV 11.2 fL      Platelets 160 10*3/mm3      Neutrophil % 75.4 %      Lymphocyte % 14.7 %      Monocyte % 7.5 %      Eosinophil % 1.6 %      Basophil % 0.4 %      Immature Grans % 0.4 %      Neutrophils, Absolute 5.11 10*3/mm3      Lymphocytes, Absolute 1.00 10*3/mm3      Monocytes, Absolute 0.51 10*3/mm3      Eosinophils, Absolute 0.11 10*3/mm3      Basophils, Absolute 0.03 10*3/mm3      Immature Grans, Absolute 0.03 10*3/mm3      nRBC 0.0 /100 WBC     Urinalysis With Microscopic If Indicated (No Culture) - Urine, Clean Catch [[id number]]  (Abnormal) Collected: 09/10/23 1255    Specimen: Urine, Clean Catch Updated: 09/10/23 1324     Color, UA Yellow     Appearance, UA Cloudy     pH, UA 7.0     Specific Gravity, UA 1.015     Glucose, UA Negative     Ketones, UA Negative     Bilirubin, UA Negative     Blood, UA Trace     Protein, UA Negative     Leuk Esterase, UA Small (1+)     Nitrite, UA Positive     Urobilinogen, UA 1.0 E.U./dL    Urinalysis, Microscopic Only - Urine, Clean Catch [[id number]]  (Abnormal) Collected: 09/10/23 1255    Specimen: Urine, Clean Catch Updated: 09/10/23 1335     RBC, UA 0-2 /HPF      WBC, UA 31-50 /HPF      Bacteria, UA 4+ /HPF      Squamous Epithelial Cells, UA 0-2 /HPF      Hyaline Casts, UA 7-12 /LPF      Methodology Automated Microscopy    Urine Culture - Urine, Urine, Clean Catch [[id number]] Collected: 09/10/23 1255    Specimen: Urine, Clean Catch Updated: 09/10/23 1402            Imaging Results (Last 24 Hours)       Procedure Component Value Units Date/Time    CT Head Without Contrast [[id number]] Collected: 09/10/23 1335     Updated: 09/10/23 1343    Narrative:      CT HEAD WO CONTRAST-     INDICATIONS: Trauma     TECHNIQUE: Radiation dose reduction techniques were utilized, including  automated exposure control and exposure  modulation based on body size.  Noncontrast head CT     COMPARISON: 2/6/2016     FINDINGS:           No acute intracranial hemorrhage, midline shift or mass effect. No acute  territorial infarct is identified.     Moderate periventricular hypodensities suggest chronic small vessel  ischemic change in a patient this age.     Arterial calcifications are seen at the base of the brain.     Ventricles, cisterns, cerebral sulci are unremarkable for patient age.     The visualized paranasal sinuses, orbits, mastoid air cells are  unremarkable. Subcutaneous soft tissue swelling is apparent at the  posterior right scalp.             Impression:         No acute intracranial hemorrhage or hydrocephalus. Chronic changes of  the brain. If there is further clinical concern, MRI could be considered  for further evaluation.     This report was finalized on 9/10/2023 1:40 PM by Dr. Mario Alberto Schrader M.D.       XR Chest 1 View [[id number]] Collected: 09/10/23 1249     Updated: 09/10/23 1253    Narrative:      XR CHEST 1 VW-     HISTORY: 78-year-old female status post fall. Hit head.     FINDINGS: Limited by the apical lordotic projection and patient's body  habitus. There is no evidence for CHF or pneumothorax. Increased density  in both mid and lower lung fields is likely related to positioning.  Follow-up with PA and lateral views of the chest is recommended when the  patient is able.     This report was finalized on 9/10/2023 12:50 PM by Dr. Tosin Simon M.D.               Results for orders placed during the hospital encounter of 02/07/22    Adult Transthoracic Echo Complete W/ Cont if Necessary Per Protocol    Interpretation Summary  · Estimated right ventricular systolic pressure from tricuspid regurgitation is normal (<35 mmHg). Calculated right ventricular systolic pressure from tricuspid regurgitation is 22 mmHg.  · Estimated left ventricular EF = 60% Left ventricular systolic function is normal.  · Left ventricular wall  thickness is consistent with moderate concentric hypertrophy.  · Left ventricular diastolic function is consistent with (grade I) impaired relaxation.      ECG 12 Lead Syncope   Final Result   HEART RATE= 48  bpm   RR Interval= 1250  ms   GA Interval= 182  ms   P Horizontal Axis= -37  deg   P Front Axis= -2  deg   QRSD Interval= 108  ms   QT Interval= 489  ms   QTcB= 437  ms   QRS Axis= -31  deg   T Wave Axis= 14  deg   - ABNORMAL ECG -   Sinus bradycardia   Left ventricular hypertrophy   Abnormal T, consider ischemia, anterior leads   No change from previous tracing   Electronically Signed By: Josef Martinez ([facility]) 10-Sep-2023 15:37:29   Date and Time of Study: 2023-09-10 12:11:23               Assessment/Plan     Active Hospital Problems    Diagnosis  POA    **Syncope and collapse [R55]  Yes    Bradycardia [R00.1]  Yes    Abnormal urine [R82.90]  Yes    Hyperglycemia [R73.9]  Yes    Diastolic dysfunction [I51.89]  Yes    Essential hypertension [I10]  Yes    Hyperlipidemia [E78.5]  Yes    Obstructive sleep apnea [G47.33]  Yes    Coronary artery disease [I25.10]  Yes      Resolved Hospital Problems   No resolved problems to display.           Brief syncopal episode most likely secondary to vasovagal attack.  Patient however is bradycardic (look below).  CNS examination and cardiac examination are negative otherwise.  CT scan of the brain without contrast is negative.  Plan neuro check and observation.  Check orthostasis.  Check carotid ultrasound.  I do not see a need for MRI   History of coronary artery disease/hypertension/diastolic dysfunction.  Cardiac examination is unremarkable except bradycardia.  Last 2D echo on 2/22 revealing a normal ejection fraction with mild left ventricular hypertrophy and grade 1 diastolic dysfunction.  Troponin is negative.  EKG revealed sinus bradycardia with ST-T wave changes and the septal and lateral leads which are old.  There is no clinical evidence of angina or congestive heart  failure.  Blood pressure is on the high side.will continue losartan.   will decrease Coreg secondary to bradycardia.  Will add Norvasc.  Will mostly require a Holter at discharge.  We will consult cardiology.  I do not see a need to repeat an echo at this time unless cardiology believes so.  Obstructive sleep apnea.  Patient to resume her CPAP machine for  Hyperlipidemia/carotid peripheral vascular disease.  LDL is 71 on 8/23.  We will continue Lipitor and aspirin.  Will check carotid ultrasound.  Hyperglycemia.  Checking A1c.  VTE prophylaxis.  Lovenox.    Discussed my findings and plan of treatment with the patient/daughter/ER provider.      Code Status -full code.       Elizabeth Ross MD  [facility]  09/10/23  15:45 EDT

## 2023-09-10 NOTE — ED NOTES
Nursing report ED to floor  Julee King  78 y.o.  female    HPI :   Chief Complaint   Patient presents with    Syncope       Admitting doctor:   Elizabeth Ross MD    Admitting diagnosis:   The primary encounter diagnosis was Syncope and collapse. Diagnoses of Sinus bradycardia, Acute UTI, and Contusion of head, unspecified part of head, initial encounter were also pertinent to this visit.    Code status:   Current Code Status       Date Active Code Status Order ID Comments User Context       Not on file            Allergies:   Codeine, Iodinated contrast media, Hctz [hydrochlorothiazide], and Sulfa antibiotics    Isolation:   No active isolations    Intake and Output  No intake or output data in the 24 hours ending 09/10/23 1415    Weight:       09/10/23  1203   Weight: 118 kg (261 lb)       Most recent vitals:   Vitals:    09/10/23 1220 09/10/23 1234 09/10/23 1408 09/10/23 1410   BP:   176/91    Pulse: 54 53 58 55   Resp:       Temp:       TempSrc:       SpO2: 95% 95% 96% 93%   Weight:       Height:           Active LDAs/IV Access:   Lines, Drains & Airways       Active LDAs       Name Placement date Placement time Site Days    Peripheral IV 09/10/23 1202 Right Antecubital 09/10/23  1202  Antecubital  less than 1                    Labs (abnormal labs have a star):   Labs Reviewed   COMPREHENSIVE METABOLIC PANEL - Abnormal; Notable for the following components:       Result Value    Glucose 119 (*)     Chloride 112 (*)     Alkaline Phosphatase 125 (*)     All other components within normal limits    Narrative:     GFR Normal >60  Chronic Kidney Disease <60  Kidney Failure <15    The GFR formula is only valid for adults with stable renal function between ages 18 and 70.   URINALYSIS W/ MICROSCOPIC IF INDICATED (NO CULTURE) - Abnormal; Notable for the following components:    Appearance, UA Cloudy (*)     Blood, UA Trace (*)     Leuk Esterase, UA Small (1+) (*)     Nitrite, UA Positive (*)     All other  components within normal limits   CBC WITH AUTO DIFFERENTIAL - Abnormal; Notable for the following components:    Lymphocyte % 14.7 (*)     All other components within normal limits   URINALYSIS, MICROSCOPIC ONLY - Abnormal; Notable for the following components:    WBC, UA 31-50 (*)     Bacteria, UA 4+ (*)     All other components within normal limits   PROTIME-INR - Normal   LIPASE - Normal   SINGLE HSTROPONIN T - Normal    Narrative:     High Sensitive Troponin T Reference Range:  <10.0 ng/L- Negative Female for AMI  <15.0 ng/L- Negative Male for AMI  >=10 - Abnormal Female indicating possible myocardial injury.  >=15 - Abnormal Male indicating possible myocardial injury.   Clinicians would have to utilize clinical acumen, EKG, Troponin, and serial changes to determine if it is an Acute Myocardial Infarction or myocardial injury due to an underlying chronic condition.        URINE CULTURE   CBC AND DIFFERENTIAL    Narrative:     The following orders were created for panel order CBC & Differential.  Procedure                               Abnormality         Status                     ---------                               -----------         ------                     CBC Auto Differential[[id number]]        Abnormal            Final result                 Please view results for these tests on the individual orders.       EKG:   ECG 12 Lead Syncope   Preliminary Result   HEART RATE= 48  bpm   RR Interval= 1250  ms   OR Interval= 182  ms   P Horizontal Axis= -37  deg   P Front Axis= -2  deg   QRSD Interval= 108  ms   QT Interval= 489  ms   QTcB= 437  ms   QRS Axis= -31  deg   T Wave Axis= 14  deg   - ABNORMAL ECG -   Sinus bradycardia   Left ventricular hypertrophy   Abnormal T, consider ischemia, anterior leads   Electronically Signed By:    Date and Time of Study: 2023-09-10 12:11:23          Meds given in ED:   Medications   sodium chloride 0.9 % flush 10 mL (has no administration in time range)   cefTRIAXone  (ROCEPHIN) 2,000 mg in sodium chloride 0.9 % 100 mL IVPB-VTB (has no administration in time range)   enalaprilat (VASOTEC) injection 1.25 mg (has no administration in time range)   ondansetron (ZOFRAN) injection 4 mg (4 mg Intravenous Given 9/10/23 1235)       Imaging results:  CT Head Without Contrast    Result Date: 9/10/2023   No acute intracranial hemorrhage or hydrocephalus. Chronic changes of the brain. If there is further clinical concern, MRI could be considered for further evaluation.  This report was finalized on 9/10/2023 1:40 PM by Dr. Mario Alberto Schrader M.D.       Ambulatory status:   - up with assistance     Social issues:   Social History     Socioeconomic History    Marital status:    Tobacco Use    Smoking status: Never    Smokeless tobacco: Never   Vaping Use    Vaping Use: Never used   Substance and Sexual Activity    Alcohol use: Yes     Comment: WINE MAYBE EVERY COUPLE OF MONTHS    Drug use: Never    Sexual activity: Not Currently     Partners: Male     Birth control/protection: None       NIH Stroke Scale:       Haylee Garcia RN  09/10/23 14:15 EDT

## 2023-09-11 ENCOUNTER — APPOINTMENT (OUTPATIENT)
Dept: CARDIOLOGY | Facility: HOSPITAL | Age: 78
DRG: 312 | End: 2023-09-11
Payer: MEDICARE

## 2023-09-11 ENCOUNTER — APPOINTMENT (OUTPATIENT)
Dept: GENERAL RADIOLOGY | Facility: HOSPITAL | Age: 78
DRG: 312 | End: 2023-09-11
Payer: MEDICARE

## 2023-09-11 PROBLEM — R73.03 PREDIABETES: Status: ACTIVE | Noted: 2023-09-11

## 2023-09-11 LAB
ANION GAP SERPL CALCULATED.3IONS-SCNC: 8 MMOL/L (ref 5–15)
BASOPHILS # BLD AUTO: 0.02 10*3/MM3 (ref 0–0.2)
BASOPHILS NFR BLD AUTO: 0.3 % (ref 0–1.5)
BH CV XLRA MEAS LEFT DIST CCA EDV: -17.4 CM/SEC
BH CV XLRA MEAS LEFT DIST CCA PSV: -104.4 CM/SEC
BH CV XLRA MEAS LEFT DIST ICA EDV: -20.7 CM/SEC
BH CV XLRA MEAS LEFT DIST ICA PSV: -89.4 CM/SEC
BH CV XLRA MEAS LEFT ICA/CCA RATIO: 1.2
BH CV XLRA MEAS LEFT MID ICA EDV: -24.3 CM/SEC
BH CV XLRA MEAS LEFT MID ICA PSV: -124.8 CM/SEC
BH CV XLRA MEAS LEFT PROX CCA EDV: 19.1 CM/SEC
BH CV XLRA MEAS LEFT PROX CCA PSV: 136.9 CM/SEC
BH CV XLRA MEAS LEFT PROX ECA EDV: -6.8 CM/SEC
BH CV XLRA MEAS LEFT PROX ECA PSV: -93.8 CM/SEC
BH CV XLRA MEAS LEFT PROX ICA EDV: 20.9 CM/SEC
BH CV XLRA MEAS LEFT PROX ICA PSV: 96.1 CM/SEC
BH CV XLRA MEAS LEFT PROX SCLA PSV: 193.4 CM/SEC
BH CV XLRA MEAS LEFT VERTEBRAL A EDV: 9.5 CM/SEC
BH CV XLRA MEAS LEFT VERTEBRAL A PSV: 43.8 CM/SEC
BH CV XLRA MEAS RIGHT DIST CCA EDV: -15.5 CM/SEC
BH CV XLRA MEAS RIGHT DIST CCA PSV: -102.5 CM/SEC
BH CV XLRA MEAS RIGHT DIST ICA EDV: -12.8 CM/SEC
BH CV XLRA MEAS RIGHT DIST ICA PSV: -53.3 CM/SEC
BH CV XLRA MEAS RIGHT ICA/CCA RATIO: 0.9
BH CV XLRA MEAS RIGHT MID ICA EDV: -23.6 CM/SEC
BH CV XLRA MEAS RIGHT MID ICA PSV: -92.3 CM/SEC
BH CV XLRA MEAS RIGHT PROX CCA EDV: 12.4 CM/SEC
BH CV XLRA MEAS RIGHT PROX CCA PSV: 103.1 CM/SEC
BH CV XLRA MEAS RIGHT PROX ECA EDV: -8.7 CM/SEC
BH CV XLRA MEAS RIGHT PROX ECA PSV: -118.7 CM/SEC
BH CV XLRA MEAS RIGHT PROX ICA EDV: 13.6 CM/SEC
BH CV XLRA MEAS RIGHT PROX ICA PSV: 64.5 CM/SEC
BH CV XLRA MEAS RIGHT PROX SCLA PSV: -140.5 CM/SEC
BH CV XLRA MEAS RIGHT VERTEBRAL A EDV: -8 CM/SEC
BH CV XLRA MEAS RIGHT VERTEBRAL A PSV: -42.2 CM/SEC
BUN SERPL-MCNC: 11 MG/DL (ref 8–23)
BUN/CREAT SERPL: 18.6 (ref 7–25)
CALCIUM SPEC-SCNC: 8.7 MG/DL (ref 8.6–10.5)
CHLORIDE SERPL-SCNC: 110 MMOL/L (ref 98–107)
CO2 SERPL-SCNC: 24 MMOL/L (ref 22–29)
CREAT SERPL-MCNC: 0.59 MG/DL (ref 0.57–1)
D-LACTATE SERPL-SCNC: 1.3 MMOL/L (ref 0.5–2)
DEPRECATED RDW RBC AUTO: 41.3 FL (ref 37–54)
EGFRCR SERPLBLD CKD-EPI 2021: 93 ML/MIN/1.73
EOSINOPHIL # BLD AUTO: 0.1 10*3/MM3 (ref 0–0.4)
EOSINOPHIL NFR BLD AUTO: 1.7 % (ref 0.3–6.2)
ERYTHROCYTE [DISTWIDTH] IN BLOOD BY AUTOMATED COUNT: 13.5 % (ref 12.3–15.4)
GLUCOSE SERPL-MCNC: 118 MG/DL (ref 65–99)
HCT VFR BLD AUTO: 37.4 % (ref 34–46.6)
HGB BLD-MCNC: 12.2 G/DL (ref 12–15.9)
IMM GRANULOCYTES # BLD AUTO: 0.01 10*3/MM3 (ref 0–0.05)
IMM GRANULOCYTES NFR BLD AUTO: 0.2 % (ref 0–0.5)
LYMPHOCYTES # BLD AUTO: 1.07 10*3/MM3 (ref 0.7–3.1)
LYMPHOCYTES NFR BLD AUTO: 18.3 % (ref 19.6–45.3)
MCH RBC QN AUTO: 27.3 PG (ref 26.6–33)
MCHC RBC AUTO-ENTMCNC: 32.6 G/DL (ref 31.5–35.7)
MCV RBC AUTO: 83.7 FL (ref 79–97)
MONOCYTES # BLD AUTO: 0.65 10*3/MM3 (ref 0.1–0.9)
MONOCYTES NFR BLD AUTO: 11.1 % (ref 5–12)
NEUTROPHILS NFR BLD AUTO: 4.01 10*3/MM3 (ref 1.7–7)
NEUTROPHILS NFR BLD AUTO: 68.4 % (ref 42.7–76)
NRBC BLD AUTO-RTO: 0 /100 WBC (ref 0–0.2)
PLATELET # BLD AUTO: 148 10*3/MM3 (ref 140–450)
PMV BLD AUTO: 11.8 FL (ref 6–12)
POTASSIUM SERPL-SCNC: 3.7 MMOL/L (ref 3.5–5.2)
RBC # BLD AUTO: 4.47 10*6/MM3 (ref 3.77–5.28)
SODIUM SERPL-SCNC: 142 MMOL/L (ref 136–145)
WBC NRBC COR # BLD: 5.86 10*3/MM3 (ref 3.4–10.8)

## 2023-09-11 PROCEDURE — 71046 X-RAY EXAM CHEST 2 VIEWS: CPT

## 2023-09-11 PROCEDURE — 85025 COMPLETE CBC W/AUTO DIFF WBC: CPT | Performed by: INTERNAL MEDICINE

## 2023-09-11 PROCEDURE — 93880 EXTRACRANIAL BILAT STUDY: CPT

## 2023-09-11 PROCEDURE — 87040 BLOOD CULTURE FOR BACTERIA: CPT | Performed by: STUDENT IN AN ORGANIZED HEALTH CARE EDUCATION/TRAINING PROGRAM

## 2023-09-11 PROCEDURE — 25010000002 CEFTRIAXONE PER 250 MG: Performed by: STUDENT IN AN ORGANIZED HEALTH CARE EDUCATION/TRAINING PROGRAM

## 2023-09-11 PROCEDURE — 97161 PT EVAL LOW COMPLEX 20 MIN: CPT

## 2023-09-11 PROCEDURE — 80048 BASIC METABOLIC PNL TOTAL CA: CPT | Performed by: INTERNAL MEDICINE

## 2023-09-11 PROCEDURE — 25010000002 ENOXAPARIN PER 10 MG: Performed by: INTERNAL MEDICINE

## 2023-09-11 PROCEDURE — 83605 ASSAY OF LACTIC ACID: CPT | Performed by: STUDENT IN AN ORGANIZED HEALTH CARE EDUCATION/TRAINING PROGRAM

## 2023-09-11 PROCEDURE — 99222 1ST HOSP IP/OBS MODERATE 55: CPT | Performed by: INTERNAL MEDICINE

## 2023-09-11 RX ORDER — ENOXAPARIN SODIUM 100 MG/ML
40 INJECTION SUBCUTANEOUS EVERY 12 HOURS
Status: DISCONTINUED | OUTPATIENT
Start: 2023-09-11 | End: 2023-09-12 | Stop reason: HOSPADM

## 2023-09-11 RX ADMIN — AMLODIPINE BESYLATE 5 MG: 5 TABLET ORAL at 13:04

## 2023-09-11 RX ADMIN — CARVEDILOL 12.5 MG: 12.5 TABLET, FILM COATED ORAL at 18:12

## 2023-09-11 RX ADMIN — ASPIRIN 81 MG: 81 TABLET, COATED ORAL at 13:04

## 2023-09-11 RX ADMIN — Medication 10 ML: at 13:06

## 2023-09-11 RX ADMIN — Medication 10 ML: at 20:45

## 2023-09-11 RX ADMIN — ATORVASTATIN CALCIUM 40 MG: 20 TABLET, FILM COATED ORAL at 20:44

## 2023-09-11 RX ADMIN — CEFTRIAXONE 2000 MG: 2 INJECTION, POWDER, FOR SOLUTION INTRAMUSCULAR; INTRAVENOUS at 18:11

## 2023-09-11 RX ADMIN — LOSARTAN POTASSIUM 100 MG: 100 TABLET, FILM COATED ORAL at 20:44

## 2023-09-11 RX ADMIN — CARVEDILOL 12.5 MG: 12.5 TABLET, FILM COATED ORAL at 13:04

## 2023-09-11 RX ADMIN — ENOXAPARIN SODIUM 40 MG: 100 INJECTION SUBCUTANEOUS at 18:12

## 2023-09-11 NOTE — PLAN OF CARE
Goal Outcome Evaluation:  Plan of Care Reviewed With: patient           Outcome Evaluation: Pt is a 79 yo F who was admitted with syncope. Pt demonstrates adequate strength and balance to perfrom functional mobility and gait independently. Pt reports she is near her baseline function. Acute care PT will sign off at this time.      Anticipated Discharge Disposition (PT): home

## 2023-09-11 NOTE — THERAPY EVALUATION
Patient Name: Julee King  : 1945    MRN: [id number]                              Today's Date: 2023       Admit Date: 9/10/2023    Visit Dx:     ICD-10-CM ICD-9-CM   1. Syncope and collapse  R55 780.2   2. Sinus bradycardia  R00.1 427.89   3. Acute UTI  N39.0 599.0   4. Contusion of head, unspecified part of head, initial encounter  S00.93XA 920     Patient Active Problem List   Diagnosis    Adiposity    Coronary artery disease    Obstructive sleep apnea    Obesity    Essential hypertension    Hyperlipidemia    Shortness of breath    LVH (left ventricular hypertrophy)    Diastolic dysfunction    Syncope and collapse    Bradycardia    Abnormal urine    Hyperglycemia    Prediabetes     Past Medical History:   Diagnosis Date    Bronchitis 2016    Cardiomyopathy     Carotid artery disease     16-49% stenosis of the left internal carotid ; carotid duplex 2017 - normal    Colon polyps     Coronary artery disease 2016    Non-STEMI and status post CABG    Dyslipidemia     GI bleed     Severely anemic and received a blood transfusion    Grade I diastolic dysfunction 2022    Per echocardiogram 2022    Hiatal hernia     Hyperlipidemia     Hypertension     Hypocalcemia     Hypokalemia     LVH (left ventricular hypertrophy) 2022    Mild to moderate per echo 2022    Non-STEMI (non-ST elevated myocardial infarction) 2016    Obesity     LOVE (obstructive sleep apnea)     moderate to severe; compliant with CPAP machine    PVC (premature ventricular contraction)      Past Surgical History:   Procedure Laterality Date    CARDIAC CATHETERIZATION N/A 2019    Procedure: Coronary angiography;  Surgeon: Cheryl Almeida MD;  Location: [facility] CATH INVASIVE LOCATION;  Service: Cardiovascular    CARDIAC CATHETERIZATION N/A 2019    Procedure: Left Heart Cath;  Surgeon: Cheryl Almeida MD;  Location: [facility] CATH INVASIVE LOCATION;  Service: Cardiovascular    CARDIAC CATHETERIZATION  N/A 02/11/2019    Procedure: Left ventriculography;  Surgeon: Cheryl Almeida MD;  Location: [facility] CATH INVASIVE LOCATION;  Service: Cardiovascular    CARDIAC CATHETERIZATION  02/11/2019    Procedure: Saphenous Vein Graft;  Surgeon: Cheryl Almeida MD;  [facility];  Service: Cardiovascular    CARDIAC CATHETERIZATION N/A 02/11/2019    Procedure: Native mammary injection;  Surgeon: Cheryl Almeida MD;  Location: [facility] CATH INVASIVE LOCATION;  Service: Cardiovascular    CHOLECYSTECTOMY      COLONOSCOPY N/A 06/08/2016    Procedure: COLONOSCOPY to cecum with cold biopsy polypectomies;  Surgeon: Alvarado Rivera MD;  Location: [facility] ENDOSCOPY;  Service:     CORONARY ARTERY BYPASS GRAFT  02/28/2016    CABG x4  Dr Monroy;  CHOI to LAD, SVG to obtuse marginal 1, SVG to obtuse marginal 2, and SVG to LV branch    ENDOSCOPY N/A 06/08/2016    Procedure: ESOPHAGOGASTRODUODENOSCOPY with biopsy;  Surgeon: Alvarado Rivera MD;  Location: [facility] ENDOSCOPY;  Service:     ENTEROSCOPY SMALL BOWEL N/A 08/02/2017    Procedure: ENTEROSCOPY SMALL BOWEL WITH COLD BIOPSIES AND ARGON PLASMA COAGULATION TO  GASTIC ULCERS;  Surgeon: Alvarado Rivera MD;  Location: [facility] ENDOSCOPY;  Service:     HERNIA REPAIR      JOINT REPLACEMENT      vijaya knees    OTHER SURGICAL HISTORY      KNEE REPLACEMENT    OTHER SURGICAL HISTORY      TREATMENT OF ANKLE FRACTURE    OTHER SURGICAL HISTORY      TREATMENT FRACTURE OF THE HAND      General Information       Row Name 09/11/23 2719          Physical Therapy Time and Intention    Document Type evaluation  -     Mode of Treatment individual therapy;physical therapy  -       Row Name 09/11/23 4312          General Information    Prior Level of Function independent:;gait;transfer;bed mobility  -     Existing Precautions/Restrictions no known precautions/restrictions  -     Barriers to Rehab none identified  -       Row Name 09/11/23 2769          Living Environment    People  in Home spouse  pt is caregiver for spouse  -       Row Name 09/11/23 1639          Cognition    Orientation Status (Cognition) oriented x 4  -               User Key  (r) = Recorded By, (t) = Taken By, (c) = Cosigned By      Initials Name Provider Type    Silvia Alvarado, PT Physical Therapist                   Mobility       Row Name 09/11/23 1641          Bed Mobility    Bed Mobility supine-sit;sit-supine  -     Supine-Sit [place] (Bed Mobility) supervision  -     Sit-Supine [place] (Bed Mobility) supervision  -       Row Name 09/11/23 1641          Sit-Stand Transfer    Sit-Stand [place] (Transfers) supervision  -     Comment, (Sit-Stand Transfer) 120  -[facility] Name 09/11/23 1641          Gait/Stairs (Locomotion)    [place] Level (Gait) supervision  -     Distance in Feet (Gait) 120  -     Comment, (Gait/Stairs) gait steady, no LOB noted  -               User Key  (r) = Recorded By, (t) = Taken By, (c) = Cosigned By      Initials Name Provider Type    Silvia Alvarado, PT Physical Therapist                   Obj/Interventions       Row Name 09/11/23 1642          Range of Motion Comprehensive    General Range of Motion no range of motion deficits identified  -[facility] Name 09/11/23 1642          Strength Comprehensive (MMT)    General Manual Muscle Testing (MMT) Assessment no strength deficits identified  -CH       Row Name 09/11/23 1642          Balance    Balance Assessment standing static balance;standing dynamic balance  -     Static Standing Balance supervision  -     Dynamic Standing Balance supervision  -               User Key  (r) = Recorded By, (t) = Taken By, (c) = Cosigned By      Initials Name Provider Type    Silvia Alvarado, PT Physical Therapist                   Goals/Plan    No documentation.                  Clinical Impression       [facility] Name 09/11/23 1642          Pain    Pretreatment Pain Rating 0/10 - no pain  -      Posttreatment Pain Rating 0/10 - no pain  -       Row Name 09/11/23 1642          Plan of Care Review    Plan of Care Reviewed With patient  -     Outcome Evaluation Pt is a 79 yo F who was admitted with syncope. Pt demonstrates adequate strength and balance to perfrom functional mobility and gait independently. Pt reports she is near her baseline function. Acute care PT will sign off at this time.  -       Row Name 09/11/23 1642          Therapy Assessment/Plan (PT)    Criteria for Skilled Interventions Met (PT) no problems identified which require skilled intervention  -     Therapy Frequency (PT) evaluation only  -       Row Name 09/11/23 1642          Positioning and Restraints    Pre-Treatment Position in bed  -     Post Treatment Position bed  -CH     In Bed supine;call light within reach;encouraged to call for assist  -               User Key  (r) = Recorded By, (t) = Taken By, (c) = Cosigned By      Initials Name Provider Type    Silvia Alvarado, PT Physical Therapist                   Outcome Measures       Row Name 09/11/23 1644          How much help from another person do you currently need...    Turning from your back to your side while in flat bed without using bedrails? 4  -CH     Moving from lying on back to sitting on the side of a flat bed without bedrails? 4  -CH     Moving to and from a bed to a chair (including a wheelchair)? 4  -CH     Standing up from a chair using your arms (e.g., wheelchair, bedside chair)? 4  -CH     Climbing 3-5 steps with a railing? 3  -CH     To walk in hospital room? 4  -CH     AM-PAC 6 Clicks Score (PT) 23  -CH     Highest level of mobility 7 --> Walked 25 feet or more  -       Row Name 09/11/23 1644          Functional Assessment    Outcome Measure Options AM-PAC 6 Clicks Basic Mobility (PT)  -               User Key  (r) = Recorded By, (t) = Taken By, (c) = Cosigned By      Initials Name Provider Type    Silvia Alvarado PT Physical  Therapist                                 Physical Therapy Education       Title: PT OT SLP Therapies (In Progress)       Topic: Physical Therapy (In Progress)       Point: Mobility training (Done)       Learning Progress Summary             Patient Acceptance, E,TB,D, VU,DU by  at 9/11/2023 1644                         Point: Home exercise program (Not Started)       Learner Progress:  Not documented in this visit.              Point: Body mechanics (Done)       Learning Progress Summary             Patient Acceptance, E,TB,D, VU,DU by  at 9/11/2023 1644                         Point: Precautions (Done)       Learning Progress Summary             Patient Acceptance, E,TB,D, VU,DU by  at 9/11/2023 1644                                         User Key       Initials Effective Dates Name Provider Type [facility] 06/16/21 -  Silvia Funes PT Physical Therapist PT                  PT Recommendation and Plan     Plan of Care Reviewed With: patient  Outcome Evaluation: Pt is a 79 yo F who was admitted with syncope. Pt demonstrates adequate strength and balance to perfrom functional mobility and gait independently. Pt reports she is near her baseline function. Acute care PT will sign off at this time.     Time Calculation:         PT Charges       Row Name 09/11/23 1644             Time Calculation    Start Time 0949  -      Stop Time 0957  -      Time Calculation (min) 8 min  -      PT Received On 09/11/23  -                User Key  (r) = Recorded By, (t) = Taken By, (c) = Cosigned By      Initials Name Provider Type     Silvia Funes PT Physical Therapist                  Therapy Charges for Today       Code Description Service Date Service Provider Modifiers Qty    [id number] HC PT EVAL LOW COMPLEXITY 2 9/11/2023 Silvia Funes, PT GP 1            PT G-Codes  Outcome Measure Options: AM-PAC 6 Clicks Basic Mobility (PT)  AM-PAC 6 Clicks Score (PT): 23  PT Discharge  Summary  Anticipated Discharge Disposition (PT): home    Silvia Funes, PT  9/11/2023

## 2023-09-11 NOTE — CONSULTS
Date of Hospital Visit: 2023  Encounter Provider: Javan Jaimes MD  Place of Service: [facility] CARDIOLOGY  Patient Name: Julee King  :1945  Referral Provider: Elizabeth Ross MD    Chief complaint     History of Present Illness    Julee King is a 78 y.o. patient who follows with Dr. Callahan in the office. She has a past medical history significant for hypertension, hyperlipidemia, LOVE (compliant with CPAP), and obesity.    2016 was the initial diagnosis of coronary artery disease, and underwent a CABG with an EF of 33%. Since then, EF has normalized.     2019 she came to [facility] for dyspnea and chest heaviness. ECHO showed normal EF 58% and mild LVH. The Cardiac PET scan was abnormal and she subsequently underwent a cardiac cath. It revealed multivessel coronary artery disease with 2 out of 4 patent grafts with medical treatment recommended.     2022, she came to the hospital with complaints of dyspnea. ECHO showed normal LVEF, moderate LVH, Grade 1 diastolic dysfunction, no wall motion abnormalities. A Cardiac PET scan showed small to medium size, mildly severe area of ischemia in the inferior wall. Dr. Callahan and Dr. Almeida together recommend medical treatment. She was started on furosemide, however, was intolerant to the medication.    At her office visit with Anabel on 23, she reported her chronic shortness of breath and lower extremity edema which were both unchanged.     She presented to the ED after a syncopal episode. She reported that she was drinking a coke and felt like it got stuck in her esophagus, and the next thing she knew, she woke up on the floor. She did hit her head and reported nausea and dizziness.     ED workup: HS troponin 9, HGB A1C 6.10, Remainder of labs unremarkable. CT head was negative for intracranial hemorrhage or hydrocephalus. CXR negative. EKG shows sinus bradycardia, LVH, Abnormal T wave, Rate  48.    She has been eating and drinking since admission with no further issues.  She denied any diaphoresis or nausea associated with the episode of syncope.  She actually had no prodrome whatsoever.  She has never experienced syncope before.  No palpitations or dizziness.  No orthopnea, PND or edema.  No angina.  No focal neurological deficits.    HPI was reviewed, updated and amended when necessary.    ECHO 2/7/22    Estimated right ventricular systolic pressure from tricuspid regurgitation is normal (<35 mmHg). Calculated right ventricular systolic pressure from tricuspid regurgitation is 22 mmHg.  Estimated left ventricular EF = 60% Left ventricular systolic function is normal.  Left ventricular wall thickness is consistent with moderate concentric hypertrophy.  Left ventricular diastolic function is consistent with (grade I) impaired relaxation.    Stress Test 2/7/22  Stress FindingsEquivocal ECG evidence of myocardial ischemia. Indeterminate clinical evidence of myocardial ischemia.      Past Medical History:   Diagnosis Date    Bronchitis 05/2016    Cardiomyopathy     Carotid artery disease     16-49% stenosis of the left internal carotid 2016; carotid duplex 1/2017 - normal    Colon polyps     Coronary artery disease 01/2016    Non-STEMI and status post CABG    Dyslipidemia     GI bleed     Severely anemic and received a blood transfusion    Grade I diastolic dysfunction 02/08/2022    Per echocardiogram 2/2022    Hiatal hernia     Hyperlipidemia     Hypertension     Hypocalcemia     Hypokalemia     LVH (left ventricular hypertrophy) 02/08/2022    Mild to moderate per echo 2/2022    Non-STEMI (non-ST elevated myocardial infarction) 01/2016    Obesity     LOVE (obstructive sleep apnea)     moderate to severe; compliant with CPAP machine    PVC (premature ventricular contraction)        Past Surgical History:   Procedure Laterality Date    CARDIAC CATHETERIZATION N/A 02/11/2019    Procedure: Coronary  angiography;  Surgeon: Cheryl Almeida MD;  Location:  [facility] CATH INVASIVE LOCATION;  Service: Cardiovascular    CARDIAC CATHETERIZATION N/A 02/11/2019    Procedure: Left Heart Cath;  Surgeon: Cheryl Almeida MD;  Location:  [facility] CATH INVASIVE LOCATION;  Service: Cardiovascular    CARDIAC CATHETERIZATION N/A 02/11/2019    Procedure: Left ventriculography;  Surgeon: Cheryl Almeida MD;  Location:  [facility] CATH INVASIVE LOCATION;  Service: Cardiovascular    CARDIAC CATHETERIZATION  02/11/2019    Procedure: Saphenous Vein Graft;  Surgeon: Cheryl Almeida MD;  Location:  [facility] CATH INVASIVE LOCATION;  Service: Cardiovascular    CARDIAC CATHETERIZATION N/A 02/11/2019    Procedure: Native mammary injection;  Surgeon: Cheryl Almeida MD;  [facility];  Service: Cardiovascular    CHOLECYSTECTOMY      COLONOSCOPY N/A 06/08/2016    Procedure: COLONOSCOPY to cecum with cold biopsy polypectomies;  Surgeon: Alvarado Rivera MD;  Location: [facility] ENDOSCOPY;  Service:     CORONARY ARTERY BYPASS GRAFT  02/28/2016    CABG x4  Dr Monroy;  CHOI to LAD, SVG to obtuse marginal 1, SVG to obtuse marginal 2, and SVG to LV branch    ENDOSCOPY N/A 06/08/2016    Procedure: ESOPHAGOGASTRODUODENOSCOPY with biopsy;  Surgeon: Alvarado Rivera MD;  Location: [facility] ENDOSCOPY;  Service:     ENTEROSCOPY SMALL BOWEL N/A 08/02/2017    Procedure: ENTEROSCOPY SMALL BOWEL WITH COLD BIOPSIES AND ARGON PLASMA COAGULATION TO  GASTIC ULCERS;  Surgeon: Alvarado Rivera MD;  Location: [facility] ENDOSCOPY;  Service:     HERNIA REPAIR      JOINT REPLACEMENT      vijaya knees    OTHER SURGICAL HISTORY      KNEE REPLACEMENT    OTHER SURGICAL HISTORY      TREATMENT OF ANKLE FRACTURE    OTHER SURGICAL HISTORY      TREATMENT FRACTURE OF THE HAND       Medications Prior to Admission   Medication Sig Dispense Refill Last Dose    aspirin 81 MG tablet Take 1 tablet by mouth Daily.   9/10/2023    atorvastatin (LIPITOR) 40 MG tablet TAKE 1 TABLET DAILY  90 tablet 3 9/9/2023    carvedilol (COREG) 25 MG tablet TAKE 1 TABLET TWICE A  tablet 3 9/10/2023    losartan (COZAAR) 100 MG tablet TAKE 1 TABLET EVERY NIGHT 90 tablet 3 9/9/2023    NON FORMULARY C PAP          Current Meds  Scheduled Meds:amLODIPine, 5 mg, Oral, Q24H  aspirin, 81 mg, Oral, Daily  atorvastatin, 40 mg, Oral, Nightly  carvedilol, 12.5 mg, Oral, BID With Meals  enoxaparin, 40 mg, Subcutaneous, Q24H  losartan, 100 mg, Oral, Nightly  senna-docusate sodium, 2 tablet, Oral, BID  sodium chloride, 10 mL, Intravenous, Q12H      Continuous Infusions:   PRN Meds:.  acetaminophen **OR** acetaminophen **OR** acetaminophen    senna-docusate sodium **AND** polyethylene glycol **AND** bisacodyl **AND** bisacodyl    calcium carbonate    Calcium Replacement - Follow Nurse / BPA Driven Protocol    Magnesium Standard Dose Replacement - Follow Nurse / BPA Driven Protocol    nitroglycerin    ondansetron **OR** ondansetron    Phosphorus Replacement - Follow Nurse / BPA Driven Protocol    Potassium Replacement - Follow Nurse / BPA Driven Protocol    [COMPLETED] Insert Peripheral IV **AND** sodium chloride    sodium chloride    sodium chloride    Allergies as of 09/10/2023 - Reviewed 09/10/2023   Allergen Reaction Noted    Codeine  02/23/2016    Iodinated contrast media  01/25/2016    Hctz [hydrochlorothiazide] Nausea Only 02/23/2022    Sulfa antibiotics Unknown (See Comments) 01/16/2018       Social History     Socioeconomic History    Marital status:    Tobacco Use    Smoking status: Never    Smokeless tobacco: Never   Vaping Use    Vaping Use: Never used   Substance and Sexual Activity    Alcohol use: Yes     Comment: WINE MAYBE EVERY COUPLE OF MONTHS    Drug use: Never    Sexual activity: Not Currently     Partners: Male     Birth control/protection: None       Family History   Problem Relation Age of Onset    Coronary artery disease Mother     Other Father     Coronary artery disease Father     Stroke  "Sister     Asthma Sister     Heart attack Sister     Heart attack Brother        Review of Systems   Constitutional: Negative for chills and fever.   HENT:  Negative for hoarse voice and sore throat.    Eyes:  Negative for double vision and photophobia.   Cardiovascular:  Positive for syncope. Negative for chest pain, leg swelling, near-syncope, orthopnea, palpitations and paroxysmal nocturnal dyspnea.   Respiratory:  Negative for cough and wheezing.    Skin:  Negative for poor wound healing and rash.   Musculoskeletal:  Negative for arthritis and joint swelling.   Gastrointestinal:  Negative for bloating, abdominal pain, hematemesis and hematochezia.   Neurological:  Negative for dizziness and focal weakness.   Psychiatric/Behavioral:  Negative for depression and suicidal ideas.           Objective:   Temp:  [97.5 °F (36.4 °C)-98.7 °F (37.1 °C)] 98.6 °F (37 °C)  Heart Rate:  [48-63] 59  Resp:  [16] 16  BP: (120-185)/(55-91) 147/55  Body mass index is 49.32 kg/m².  Flowsheet Rows      Flowsheet Row First Filed Value   Admission Height 154.9 cm (61\") Documented at 09/10/2023 1203   Admission Weight 118 kg (261 lb) Documented at 09/10/2023 1203          Vitals:    09/10/23 2338   BP: 147/55   Pulse: 59   Resp: 16   Temp: 98.6 °F (37 °C)   SpO2: 94%       Vitals reviewed.   Constitutional:       Appearance: Healthy appearance. Not in distress.   Neck:      Vascular: No JVR. JVD normal.   Pulmonary:      Effort: Pulmonary effort is normal.      Breath sounds: Normal breath sounds. No wheezing. No rhonchi. No rales.   Chest:      Chest wall: Not tender to palpatation.   Cardiovascular:      PMI at left midclavicular line. Normal rate. Regular rhythm. Normal S1. Normal S2.       Murmurs: There is no murmur.      No gallop.  No click. No rub.   Pulses:     Intact distal pulses.   Edema:     Peripheral edema absent.   Abdominal:      General: Bowel sounds are normal.      Palpations: Abdomen is soft.      Tenderness: There " is no abdominal tenderness.   Musculoskeletal: Normal range of motion.         General: No tenderness. Skin:     General: Skin is warm and dry.   Neurological:      General: No focal deficit present.      Mental Status: Alert and oriented to person, place and time.               Lab Review:      Results from last 7 days   Lab Units 09/10/23  1231   SODIUM mmol/L 144   POTASSIUM mmol/L 3.7   CHLORIDE mmol/L 112*   CO2 mmol/L 22.0   BUN mg/dL 13   CREATININE mg/dL 0.59   CALCIUM mg/dL 8.9   BILIRUBIN mg/dL 0.8   ALK PHOS U/L 125*   ALT (SGPT) U/L 9   AST (SGOT) U/L 11   GLUCOSE mg/dL 119*     Results from last 7 days   Lab Units 09/10/23  1231   HSTROP T ng/L 9     @LABRCNTbnp@  Results from last 7 days   Lab Units 09/11/23  0451 09/10/23  1231   WBC 10*3/mm3 5.86 6.79   HEMOGLOBIN g/dL 12.2 13.4   HEMATOCRIT % 37.4 41.4   PLATELETS 10*3/mm3 148 160     Results from last 7 days   Lab Units 09/10/23  1231   INR  1.09         @LABRCNTIP(chol,trig,hdl,ldl)    9/10/23        9/7/23    I personally viewed and interpreted the patient's EKG/Telemetry data    Syncope and collapse    Coronary artery disease    Obstructive sleep apnea    Essential hypertension    Hyperlipidemia    Diastolic dysfunction    Bradycardia    Abnormal urine    Hyperglycemia    Assessment and Plan:    Syncope -etiology uncertain.  Given the relationship to oral intake cannot exclude vasodepressor episode however no prodrome symptoms that would be suspicious for vasovagal etiology. Echocardiogram image acquisition complete and I await upload so that I can analyze for further recommendations.  No arrhythmias on telemetry.  No angina or heart failure symptoms.  CAD -stable issues with no symptoms.  Last cath report and images reviewed.  No indication for ischemic testing at this time.  Chronic diastolic heart failure -euvolemic on exam.  Echo pending.  Hypertension  Dyslipidemia    If echo findings are stable I would recommend home with ambulatory  telemetry monitor.  Follow diagnostic testing results for further treatment recommendations    Javan Jaimes MD  09/11/23  06:13 EDT.  Time spent in reviewing chart, discussion and examination:

## 2023-09-11 NOTE — CASE MANAGEMENT/SOCIAL WORK
Discharge Planning Assessment  [facility]     Patient Name: Julee King  MRN: [id number]  Today's Date: 9/11/2023    Admit Date: 9/10/2023    Plan: Return home with family support   Discharge Needs Assessment       Row Name 09/11/23 1637       Living Environment    People in Home spouse    Current Living Arrangements home    Potentially Unsafe Housing Conditions none    Primary Care Provided by self    Provides Primary Care For spouse    Family Caregiver if Needed child(matt), adult    Family Caregiver Names Nya Katelynn [facility] [phone] or Shani Kowalski [phone].    Quality of Family Relationships unable to assess    Able to Return to Prior Arrangements yes       Resource/Environmental Concerns    Resource/Environmental Concerns none    Transportation Concerns none       Transition Planning    Patient/Family Anticipates Transition to home with family    Patient/Family Anticipated Services at Transition none    Transportation Anticipated family or friend will provide       Discharge Needs Assessment    Readmission Within the Last 30 Days no previous admission in last 30 days    Equipment Currently Used at Home cpap    Concerns to be Addressed no discharge needs identified;denies needs/concerns at this time    Anticipated Changes Related to Illness none    Equipment Needed After Discharge none    Provided Post Acute Provider List? N/A    N/A Provider List Comment No needs identified    Provided Post Acute Provider Quality & Resource List? N/A                   Discharge Plan       Row Name 09/11/23 1658       Plan    Plan Return home with family support    Patient/Family in Agreement with Plan yes    Plan Comments Met with patient and sister at bedside. Patient granted me permission to speak with her while family remained in the room. Face sheet verified. Prior to admission patient was independent with all aspects of her ADL’s. She cares for her . She uses CPAP. Patient uses the [facility] in [facility]  any issues affording or remembering to take her medications. Patient will use [facility] at discharge. Patient states she has a new PCP that she will see on 10-1-2023, but she does not remember his/her name. Discharge plans are to return home with family. Family will transport. Will continue to monitor for new or changing discharge needs. Shani PEREZ RN CCP                  Continued Care and Services - Admitted Since 9/10/2023    Coordination has not been started for this encounter.       Expected Discharge Date and Time       Expected Discharge Date Expected Discharge Time    Sep 13, 2023            Demographic Summary       Row Name 09/11/23 1636       General Information    Admission Type inpatient    Arrived From emergency department    Referral Source admission list    Reason for Consult discharge planning    Preferred Language English       Contact Information    Permission Granted to Share Info With family/designee  Nya Pace [facility] [phone] or Shani Kowalski [phone].                   Functional Status       Row Name 09/11/23 1637       Functional Status    Usual Activity Tolerance good    Current Activity Tolerance good       Functional Status, IADL    Medications independent    Meal Preparation independent    Housekeeping independent    Laundry independent    Shopping independent       Mental Status    General Appearance WDL WDL       Mental Status Summary    Recent Changes in Mental Status/Cognitive Functioning no changes       Employment/    Employment Status retired                   Psychosocial    No documentation.                  Abuse/Neglect    No documentation.                  Legal    No documentation.                  Substance Abuse    No documentation.                  Patient Forms    No documentation.                     Shani Antonio RN

## 2023-09-11 NOTE — PROGRESS NOTES
Name: Julee King ADMIT: 9/10/2023   : 1945  PCP: Provider, No Known    MRN: [id number] LOS: 0 days   AGE/SEX: 78 y.o. female  ROOM: [facility]     Subjective   Subjective   Patient seen and examined this morning. Hospital day 1.  At time of my examination, patient is awake, alert, sitting up in chair next to bedside.  Family present.  She states that she feels better today when compared to prior.  Underwent carotid duplex earlier this morning.  Cardiology consulted and following.  At present, she endorses dyspnea, which she states is more chronic in nature, denies chest pain, palpitations, dizziness, lightheadedness or further syncopal events since arrival.       Objective   Objective   Vital Signs  Temp:  [97.5 °F (36.4 °C)-98.7 °F (37.1 °C)] 97.9 °F (36.6 °C)  Heart Rate:  [48-63] 57  Resp:  [16-18] 18  BP: (120-185)/(43-91) 133/43  SpO2:  [89 %-98 %] 94 %  on   ;   Device (Oxygen Therapy): CPAP  Body mass index is 49.32 kg/m².  Physical Exam  Vitals and nursing note reviewed.   Constitutional:       General: She is not in acute distress.     Appearance: She is obese.   Cardiovascular:      Rate and Rhythm: Normal rate and regular rhythm.      Pulses: Normal pulses.      Heart sounds: Normal heart sounds.   Pulmonary:      Effort: Pulmonary effort is normal. No respiratory distress.      Breath sounds: Normal breath sounds. No wheezing.   Abdominal:      General: Bowel sounds are normal. There is no distension.      Palpations: Abdomen is soft.      Tenderness: There is no abdominal tenderness.   Musculoskeletal:      Right lower leg: No edema.      Left lower leg: No edema.      Comments: Trace lower extremity edema   Skin:     General: Skin is warm and dry.   Neurological:      General: No focal deficit present.      Mental Status: She is alert and oriented to person, place, and time.     Results Review     I reviewed the patient's new clinical results.  Results from last 7 days   Lab Units  09/11/23  0451 09/10/23  1231   WBC 10*3/mm3 5.86 6.79   HEMOGLOBIN g/dL 12.2 13.4   PLATELETS 10*3/mm3 148 160     Results from last 7 days   Lab Units 09/11/23  0451 09/10/23  1231   SODIUM mmol/L 142 144   POTASSIUM mmol/L 3.7 3.7   CHLORIDE mmol/L 110* 112*   CO2 mmol/L 24.0 22.0   BUN mg/dL 11 13   CREATININE mg/dL 0.59 0.59   GLUCOSE mg/dL 118* 119*   EGFR mL/min/1.73 93.0 93.0     Results from last 7 days   Lab Units 09/10/23  1231   ALBUMIN g/dL 3.8   BILIRUBIN mg/dL 0.8   ALK PHOS U/L 125*   AST (SGOT) U/L 11   ALT (SGPT) U/L 9     Results from last 7 days   Lab Units 09/11/23  0451 09/10/23  1231   CALCIUM mg/dL 8.7 8.9   ALBUMIN g/dL  --  3.8       Hemoglobin A1C   Date/Time Value Ref Range Status   09/10/2023 1231 6.10 (H) 4.80 - 5.60 % Final       CT Head Without Contrast    Result Date: 9/10/2023   No acute intracranial hemorrhage or hydrocephalus. Chronic changes of the brain. If there is further clinical concern, MRI could be considered for further evaluation.  This report was finalized on 9/10/2023 1:40 PM by Dr. Mario Alberto Schrader M.D.       I have personally reviewed all medications:  Scheduled Medications  amLODIPine, 5 mg, Oral, Q24H  aspirin, 81 mg, Oral, Daily  atorvastatin, 40 mg, Oral, Nightly  carvedilol, 12.5 mg, Oral, BID With Meals  enoxaparin, 40 mg, Subcutaneous, Q24H  losartan, 100 mg, Oral, Nightly  senna-docusate sodium, 2 tablet, Oral, BID  sodium chloride, 10 mL, Intravenous, Q12H    Infusions   Diet  Diet: Cardiac Diets; Healthy Heart (2-3 Na+); Texture: Regular Texture (IDDSI 7); Fluid Consistency: Thin (IDDSI 0)    I have personally reviewed:  [x]  Laboratory   [x]  Microbiology   [x]  Radiology   [x]  EKG/Telemetry  [x]  Cardiology/Vascular   []  Pathology    []  Records       Assessment/Plan     Active Hospital Problems    Diagnosis  POA    **Syncope and collapse [R55]  Yes    Prediabetes [R73.03]  Yes    Bradycardia [R00.1]  Yes    Abnormal urine [R82.90]  Yes     Hyperglycemia [R73.9]  Yes    Diastolic dysfunction [I51.89]  Yes    Essential hypertension [I10]  Yes    Hyperlipidemia [E78.5]  Yes    Obstructive sleep apnea [G47.33]  Yes    Coronary artery disease [I25.10]  Yes      Resolved Hospital Problems   No resolved problems to display.       78 y.o. female admitted with Syncope and collapse.    Syncope and collapse  - Unclear etiology, consider vasovagal, vs. Situational in setting of coughing prior to event. However, also consider carotid sinus hypersensitivity vs cardiac pathology given patients cardiac history. CXR limited and repeat testing recommended when patient able. EKG showing bradycardia as discussed elsewhere. CT Head negative for acute intracranial pathology.  - Cardiology consulted. Will continue to follow their plans/recommendations. Greatly appreciate their help.  - Order PA/Lateral chest x-ray per radiologist recommendations.  - Follow up Carotid doppler.   - Monitor on telemetry.    Bradycardia  - Noted on vital signs, as low as 48 on 09/10 at 1608. EKG obtained, showing sinus bradycardia, LVH, abnormal T waves, unchanged from previous tracing.  - On most recent vital signs, heart rate remains low, however, slightly improved from admission.  - Cardiology consulted. Will continue to follow their plans/recommendations. Greatly appreciate their help.  - Monitor on telemetry.    Hypertension  - BP elevated on admission, however, on most recent checks, more stable and acceptable acutely on current treatment regimen.  No indications to warrant acute changes/intervention at this time.  - Continue current medications as prescribed. Trend BP to guide ongoing management decisions.    Chronic HFpEF  - Most recent prior 2D Echocardiogram: (02/27/22) showing: EF 60% with grade 1 diastolic dysfunction. At present, patient appears stable from this perspective.  She does not have any evidence to suggest acute heart failure exacerbation.  No acute intervention  warranted, however, do need to closely monitor.  - Continue current treatments as prescribed.  - Strict I/O, daily weights, telemetry, pulse oximetry, low sodium diet.    Coronary artery disease  - Patient appears stable from this perspective at this time, denies any chest pain, dyspnea, palpitations, no signs/symptoms to suggest ACS.  - Continue current treatment as prescribed. Will continue to monitor.  - Continue telemetry monitoring.    Urinary tract infection  - Patient presenting with symptoms of urinary urgency and frequency coupled with UA findings showing 1+ leukocyte esterase, positive nitrite, 31-50 WBC, 4+ bacteria.  - S/P 1 dose of Ceftriaxone in ED.  - Continue empiric ceftriaxone as prescribed for now while awaiting results of further workup.  - Follow up blood and urine cultures.  - Acute urinary retention protocol.    Carotid peripheral vascular disease  Hyperlipidemia  - Most recent LDL 71 in August 2023. Carotid duplex ordered, results pending.  - Continue Statin as prescribed.    Prediabetes with Hyperglycemia  - Glucose elevated on most recent labs. Patient without known history of T2DM.  Most recent hemoglobin A1c 6.10% (09/10/23).  - Order repeat A1c at this time.  - Monitor for now, continue with POC glucose checks, can add correctional SSI if needed.    Obstructive sleep apnea  - Continue use of home CPAP.        Lovenox 40 mg SC daily for DVT prophylaxis.  Full code.  Discussed with patient, nursing staff, consulting provider, CCP, and care team on multidisciplinary rounds.  Anticipate discharge  TBD  timing yet to be determined..      Ottoniel Lopez DO  [facility]  09/11/23  10:26 EDT

## 2023-09-12 ENCOUNTER — APPOINTMENT (OUTPATIENT)
Dept: CARDIOLOGY | Facility: HOSPITAL | Age: 78
DRG: 312 | End: 2023-09-12
Payer: MEDICARE

## 2023-09-12 ENCOUNTER — READMISSION MANAGEMENT (OUTPATIENT)
Dept: CALL CENTER | Facility: HOSPITAL | Age: 78
End: 2023-09-12
Payer: MEDICARE

## 2023-09-12 VITALS
HEART RATE: 67 BPM | OXYGEN SATURATION: 96 % | HEIGHT: 61 IN | DIASTOLIC BLOOD PRESSURE: 58 MMHG | WEIGHT: 260.36 LBS | RESPIRATION RATE: 18 BRPM | BODY MASS INDEX: 49.16 KG/M2 | TEMPERATURE: 98.1 F | SYSTOLIC BLOOD PRESSURE: 130 MMHG

## 2023-09-12 PROBLEM — N39.0 ACUTE UTI: Status: ACTIVE | Noted: 2023-09-12

## 2023-09-12 LAB
ANION GAP SERPL CALCULATED.3IONS-SCNC: 8.8 MMOL/L (ref 5–15)
AORTIC ARCH: 2.7 CM
AORTIC DIMENSIONLESS INDEX: 0.8 (DI)
ASCENDING AORTA: 3.1 CM
BACTERIA SPEC AEROBE CULT: ABNORMAL
BASOPHILS # BLD AUTO: 0.01 10*3/MM3 (ref 0–0.2)
BASOPHILS NFR BLD AUTO: 0.2 % (ref 0–1.5)
BH CV ECHO MEAS - AO MAX PG: 3.7 MMHG
BH CV ECHO MEAS - AO MEAN PG: 2 MMHG
BH CV ECHO MEAS - AO V2 MAX: 96.4 CM/SEC
BH CV ECHO MEAS - AO V2 VTI: 22.9 CM
BH CV ECHO MEAS - AVA(I,D): 2.7 CM2
BH CV ECHO MEAS - EDV(CUBED): 98.5 ML
BH CV ECHO MEAS - EDV(MOD-SP2): 117 ML
BH CV ECHO MEAS - EDV(MOD-SP4): 132 ML
BH CV ECHO MEAS - EF(MOD-BP): 57.4 %
BH CV ECHO MEAS - EF(MOD-SP2): 55.6 %
BH CV ECHO MEAS - EF(MOD-SP4): 58.3 %
BH CV ECHO MEAS - ESV(CUBED): 31.4 ML
BH CV ECHO MEAS - ESV(MOD-SP2): 52 ML
BH CV ECHO MEAS - ESV(MOD-SP4): 55 ML
BH CV ECHO MEAS - FS: 31.7 %
BH CV ECHO MEAS - IVS/LVPW: 1.02 CM
BH CV ECHO MEAS - IVSD: 0.89 CM
BH CV ECHO MEAS - LAT PEAK E' VEL: 7 CM/SEC
BH CV ECHO MEAS - LV DIASTOLIC VOL/BSA (35-75): 62.5 CM2
BH CV ECHO MEAS - LV MASS(C)D: 135.2 GRAMS
BH CV ECHO MEAS - LV MAX PG: 2.2 MMHG
BH CV ECHO MEAS - LV MEAN PG: 1 MMHG
BH CV ECHO MEAS - LV SYSTOLIC VOL/BSA (12-30): 26 CM2
BH CV ECHO MEAS - LV V1 MAX: 74.2 CM/SEC
BH CV ECHO MEAS - LV V1 VTI: 17.8 CM
BH CV ECHO MEAS - LVIDD: 4.6 CM
BH CV ECHO MEAS - LVIDS: 3.2 CM
BH CV ECHO MEAS - LVOT AREA: 3.5 CM2
BH CV ECHO MEAS - LVOT DIAM: 2.12 CM
BH CV ECHO MEAS - LVPWD: 0.88 CM
BH CV ECHO MEAS - MED PEAK E' VEL: 5.4 CM/SEC
BH CV ECHO MEAS - MV A DUR: 0.13 SEC
BH CV ECHO MEAS - MV A MAX VEL: 66.5 CM/SEC
BH CV ECHO MEAS - MV DEC SLOPE: 92.9 CM/SEC2
BH CV ECHO MEAS - MV DEC TIME: 209 MSEC
BH CV ECHO MEAS - MV E MAX VEL: 52.9 CM/SEC
BH CV ECHO MEAS - MV E/A: 0.8
BH CV ECHO MEAS - MV MAX PG: 1.7 MMHG
BH CV ECHO MEAS - MV MEAN PG: 0.64 MMHG
BH CV ECHO MEAS - MV P1/2T: 149.8 MSEC
BH CV ECHO MEAS - MV V2 VTI: 26.6 CM
BH CV ECHO MEAS - MVA(P1/2T): 1.47 CM2
BH CV ECHO MEAS - MVA(VTI): 2.36 CM2
BH CV ECHO MEAS - PA ACC TIME: 0.15 SEC
BH CV ECHO MEAS - PA V2 MAX: 69.2 CM/SEC
BH CV ECHO MEAS - PULM A REVS DUR: 0.11 SEC
BH CV ECHO MEAS - PULM A REVS VEL: 22.4 CM/SEC
BH CV ECHO MEAS - PULM DIAS VEL: 35.9 CM/SEC
BH CV ECHO MEAS - PULM S/D: 0.8
BH CV ECHO MEAS - PULM SYS VEL: 28.6 CM/SEC
BH CV ECHO MEAS - QP/QS: 0.88
BH CV ECHO MEAS - RAP SYSTOLE: 3 MMHG
BH CV ECHO MEAS - RV MAX PG: 1.17 MMHG
BH CV ECHO MEAS - RV V1 MAX: 54 CM/SEC
BH CV ECHO MEAS - RV V1 VTI: 11.9 CM
BH CV ECHO MEAS - RVOT DIAM: 2.43 CM
BH CV ECHO MEAS - RVSP: 11 MMHG
BH CV ECHO MEAS - SI(MOD-SP2): 30.8 ML/M2
BH CV ECHO MEAS - SI(MOD-SP4): 36.5 ML/M2
BH CV ECHO MEAS - SV(LVOT): 62.6 ML
BH CV ECHO MEAS - SV(MOD-SP2): 65 ML
BH CV ECHO MEAS - SV(MOD-SP4): 77 ML
BH CV ECHO MEAS - SV(RVOT): 55.3 ML
BH CV ECHO MEAS - TAPSE (>1.6): 1.13 CM
BH CV ECHO MEAS - TR MAX PG: 8.4 MMHG
BH CV ECHO MEAS - TR MAX VEL: 145.2 CM/SEC
BH CV ECHO MEASUREMENTS AVERAGE E/E' RATIO: 8.53
BH CV XLRA - RV BASE: 3.7 CM
BH CV XLRA - RV LENGTH: 7.4 CM
BH CV XLRA - RV MID: 3.4 CM
BH CV XLRA - TDI S': 9 CM/SEC
BUN SERPL-MCNC: 15 MG/DL (ref 8–23)
BUN/CREAT SERPL: 23.4 (ref 7–25)
CALCIUM SPEC-SCNC: 8.3 MG/DL (ref 8.6–10.5)
CHLORIDE SERPL-SCNC: 110 MMOL/L (ref 98–107)
CO2 SERPL-SCNC: 24.2 MMOL/L (ref 22–29)
CREAT SERPL-MCNC: 0.64 MG/DL (ref 0.57–1)
DEPRECATED RDW RBC AUTO: 42.5 FL (ref 37–54)
EGFRCR SERPLBLD CKD-EPI 2021: 91.2 ML/MIN/1.73
EOSINOPHIL # BLD AUTO: 0.12 10*3/MM3 (ref 0–0.4)
EOSINOPHIL NFR BLD AUTO: 1.9 % (ref 0.3–6.2)
ERYTHROCYTE [DISTWIDTH] IN BLOOD BY AUTOMATED COUNT: 13.6 % (ref 12.3–15.4)
GLUCOSE SERPL-MCNC: 144 MG/DL (ref 65–99)
HCT VFR BLD AUTO: 37.3 % (ref 34–46.6)
HGB BLD-MCNC: 11.9 G/DL (ref 12–15.9)
IMM GRANULOCYTES # BLD AUTO: 0.01 10*3/MM3 (ref 0–0.05)
IMM GRANULOCYTES NFR BLD AUTO: 0.2 % (ref 0–0.5)
LEFT ATRIUM VOLUME INDEX: 30.6 ML/M2
LYMPHOCYTES # BLD AUTO: 1.26 10*3/MM3 (ref 0.7–3.1)
LYMPHOCYTES NFR BLD AUTO: 20.3 % (ref 19.6–45.3)
MCH RBC QN AUTO: 27 PG (ref 26.6–33)
MCHC RBC AUTO-ENTMCNC: 31.9 G/DL (ref 31.5–35.7)
MCV RBC AUTO: 84.8 FL (ref 79–97)
MONOCYTES # BLD AUTO: 0.63 10*3/MM3 (ref 0.1–0.9)
MONOCYTES NFR BLD AUTO: 10.1 % (ref 5–12)
NEUTROPHILS NFR BLD AUTO: 4.19 10*3/MM3 (ref 1.7–7)
NEUTROPHILS NFR BLD AUTO: 67.3 % (ref 42.7–76)
NRBC BLD AUTO-RTO: 0 /100 WBC (ref 0–0.2)
PLATELET # BLD AUTO: 146 10*3/MM3 (ref 140–450)
PMV BLD AUTO: 11.2 FL (ref 6–12)
POTASSIUM SERPL-SCNC: 3.5 MMOL/L (ref 3.5–5.2)
RBC # BLD AUTO: 4.4 10*6/MM3 (ref 3.77–5.28)
SINUS: 3.6 CM
SODIUM SERPL-SCNC: 143 MMOL/L (ref 136–145)
STJ: 2.9 CM
WBC NRBC COR # BLD: 6.22 10*3/MM3 (ref 3.4–10.8)

## 2023-09-12 PROCEDURE — 93306 TTE W/DOPPLER COMPLETE: CPT

## 2023-09-12 PROCEDURE — 80048 BASIC METABOLIC PNL TOTAL CA: CPT | Performed by: STUDENT IN AN ORGANIZED HEALTH CARE EDUCATION/TRAINING PROGRAM

## 2023-09-12 PROCEDURE — 99232 SBSQ HOSP IP/OBS MODERATE 35: CPT | Performed by: NURSE PRACTITIONER

## 2023-09-12 PROCEDURE — 85025 COMPLETE CBC W/AUTO DIFF WBC: CPT | Performed by: STUDENT IN AN ORGANIZED HEALTH CARE EDUCATION/TRAINING PROGRAM

## 2023-09-12 PROCEDURE — 25510000001 PERFLUTREN (DEFINITY) 8.476 MG IN SODIUM CHLORIDE (PF) 0.9 % 10 ML INJECTION: Performed by: NURSE PRACTITIONER

## 2023-09-12 PROCEDURE — 25010000002 ENOXAPARIN PER 10 MG: Performed by: INTERNAL MEDICINE

## 2023-09-12 PROCEDURE — 93306 TTE W/DOPPLER COMPLETE: CPT | Performed by: INTERNAL MEDICINE

## 2023-09-12 PROCEDURE — 25010000002 CEFTRIAXONE PER 250 MG: Performed by: STUDENT IN AN ORGANIZED HEALTH CARE EDUCATION/TRAINING PROGRAM

## 2023-09-12 PROCEDURE — 93246 EXT ECG>7D<15D RECORDING: CPT

## 2023-09-12 RX ORDER — POTASSIUM CHLORIDE 750 MG/1
40 TABLET, FILM COATED, EXTENDED RELEASE ORAL EVERY 4 HOURS
Status: COMPLETED | OUTPATIENT
Start: 2023-09-12 | End: 2023-09-12

## 2023-09-12 RX ORDER — CEFDINIR 300 MG/1
300 CAPSULE ORAL 2 TIMES DAILY
Qty: 14 CAPSULE | Refills: 0 | Status: SHIPPED | OUTPATIENT
Start: 2023-09-12 | End: 2023-09-19

## 2023-09-12 RX ORDER — CEFDINIR 300 MG/1
300 CAPSULE ORAL 2 TIMES DAILY
Qty: 14 CAPSULE | Refills: 0 | Status: SHIPPED | OUTPATIENT
Start: 2023-09-12 | End: 2023-09-12 | Stop reason: SDUPTHER

## 2023-09-12 RX ORDER — AMLODIPINE BESYLATE 5 MG/1
5 TABLET ORAL
Qty: 30 TABLET | Refills: 0 | Status: SHIPPED | OUTPATIENT
Start: 2023-09-13

## 2023-09-12 RX ORDER — AMLODIPINE BESYLATE 5 MG/1
5 TABLET ORAL
Qty: 30 TABLET | Refills: 0 | Status: SHIPPED | OUTPATIENT
Start: 2023-09-13 | End: 2023-09-12 | Stop reason: SDUPTHER

## 2023-09-12 RX ORDER — CARVEDILOL 12.5 MG/1
12.5 TABLET ORAL 2 TIMES DAILY WITH MEALS
Qty: 60 TABLET | Refills: 0 | Status: SHIPPED | OUTPATIENT
Start: 2023-09-12

## 2023-09-12 RX ORDER — CARVEDILOL 12.5 MG/1
12.5 TABLET ORAL 2 TIMES DAILY WITH MEALS
Qty: 60 TABLET | Refills: 0 | Status: SHIPPED | OUTPATIENT
Start: 2023-09-12 | End: 2023-09-12 | Stop reason: SDUPTHER

## 2023-09-12 RX ADMIN — POTASSIUM CHLORIDE 40 MEQ: 750 TABLET, EXTENDED RELEASE ORAL at 12:00

## 2023-09-12 RX ADMIN — SENNOSIDES AND DOCUSATE SODIUM 2 TABLET: 50; 8.6 TABLET ORAL at 09:39

## 2023-09-12 RX ADMIN — POTASSIUM CHLORIDE 40 MEQ: 750 TABLET, EXTENDED RELEASE ORAL at 14:39

## 2023-09-12 RX ADMIN — ASPIRIN 81 MG: 81 TABLET, COATED ORAL at 09:39

## 2023-09-12 RX ADMIN — AMLODIPINE BESYLATE 5 MG: 5 TABLET ORAL at 09:39

## 2023-09-12 RX ADMIN — PERFLUTREN 2 ML: 6.52 INJECTION, SUSPENSION INTRAVENOUS at 15:37

## 2023-09-12 RX ADMIN — CEFTRIAXONE 2000 MG: 2 INJECTION, POWDER, FOR SOLUTION INTRAMUSCULAR; INTRAVENOUS at 14:39

## 2023-09-12 RX ADMIN — CARVEDILOL 12.5 MG: 12.5 TABLET, FILM COATED ORAL at 09:39

## 2023-09-12 RX ADMIN — CARVEDILOL 12.5 MG: 12.5 TABLET, FILM COATED ORAL at 17:23

## 2023-09-12 RX ADMIN — ENOXAPARIN SODIUM 40 MG: 100 INJECTION SUBCUTANEOUS at 06:09

## 2023-09-12 NOTE — DISCHARGE SUMMARY
Patient Name: Julee King  : 1945  MRN: [id number]    Date of Admission: 9/10/2023  Date of Discharge:  2023  Primary Care Physician: Provider, No Known      Chief Complaint:   Syncope      Discharge Diagnoses     Active Hospital Problems    Diagnosis  POA    **Syncope and collapse [R55]  Yes    Acute UTI [N39.0]  Yes    Prediabetes [R73.03]  Yes    Bradycardia [R00.1]  Yes    Abnormal urine [R82.90]  Yes    Hyperglycemia [R73.9]  Yes    Diastolic dysfunction [I51.89]  Yes    Essential hypertension [I10]  Yes    Hyperlipidemia [E78.5]  Yes    Obstructive sleep apnea [G47.33]  Yes    Coronary artery disease [I25.10]  Yes      Resolved Hospital Problems   No resolved problems to display.        Hospital Course     Ms. King is a 78 y.o. female with a history of CAD, HFpEF, HTN, HLD, LOVE who presented to [facility] initially after suffering a syncopal event prior to arrival. Please see the admitting history and physical for further details.  She was admitted to the hospital for further evaluation and treatment.     Syncope and collapse  Asymptomatic sinus bradycardia, resolved  - Patient suffering episode of syncope prior to arrival with unclear definitive etiology, considered vasovagal, vs. Situational in setting of coughing prior to event. However, also considered carotid sinus hypersensitivity vs cardiac pathology given patients cardiac history. Initial CXR limited, repeat with PA/LAT showed Cardiomegaly, atelectasis, no pleural effusion or pneumothorax. Cardiology was consulted and followed patient for duration of her hospital stay. Bradycardia noted on vital signs, as low as 48 on 09/10 at 1608. EKG obtained, showing sinus bradycardia, LVH, abnormal T waves, unchanged from previous tracing. On subsequent vital measurements, bradycardia had resolved. She was evaluated by cardiology and bradycardia felt to be asymptomatic, not warranting acute intervention at present. CT Head  negative for acute intracranial pathology. Carotid duplex: Right internal carotid artery and left internal carotid artery without evidence of hemodynamically significant stenosis. Repeat 2D Echocardiogram showing EF 56-60% with grade 1 diastolic dysfunction. On day of discharge, patient was resting comfortably and felt well without acute complaints and had not suffered further syncopal episodes since prior to arrival. She stated that she felt well and expressed wishes to go home. She was eager to go home because she stated she needed to care for her sick . Discussed with Cardiology prior to discharge, echocardiogram reviewed along with all test results, no further acute intervention warranted at present and patient was cleared for discharge from their perspective with plans for placement of 2 week cardiac monitor at discharge and close outpatient follow up. Follow up with Cardiology after discharge as scheduled for reassessment. Also, recommend close follow up with PCP within 1 week after discharge for reassessment to guide ongoing management decisions.    Urinary tract infection  - Patient presenting with symptoms of urinary urgency and frequency coupled with UA findings showing 1+ leukocyte esterase, positive nitrite, 31-50 WBC, 4+ bacteria. S/P 1 dose of Ceftriaxone in ED. Continued empiric ceftriaxone as prescribed while awaiting results of further workup, which patient tolerated well and led to symptom improvement. Urine culture later resulted + for >100,000 CFU/mL Klebsiella pneumoniae, sensitive to all listed antibiotics (including cephalosporins) and only resistant to ampicillin. Blood cultures no growth to date. At discharge she was prescribed Cefdinir 300 mg BID to be taken as prescribed to complete antibiotic course. Recommend close follow up with PCP within 1 week after discharge for reassessment to guide ongoing management decisions.    Hypertension  - BP elevated on admission, however, on most  "recent checks, more stable and acceptable acutely on current treatment regimen.  No indications to warrant acute changes/intervention at this time. Continue current medications as prescribed (see orders). Recommend that patient check her blood pressure 2-3 times daily and record those values in log book and take with her to next PCP visit to allow for greater insight into treatment efficacy to guide further management decisions. Recommend heart healthy/low sodium diet. Recommend close follow up with PCP within 1 week after discharge for reassessment to guide ongoing management decisions.     Chronic HFpEF  - Most recent prior 2D Echocardiogram: (02/27/22) showing: EF 60% with grade 1 diastolic dysfunction. At present, patient appears stable from this perspective.  She does not have any evidence to suggest acute heart failure exacerbation.  Evaluated by Cardiology who felt patient was stable as well. Continue current treatments as prescribed. Recommend monitoring of weight after discharge and low sodium/heart healthy diet. Recommend close follow up with Cardiology within 1 week after discharge for reassessment to guide ongoing management decisions.     Coronary artery disease  - History of CABG. Per Cardiology note: \"Last cath 2019. 2/4 grafts patent. Stress 2022 with mild ischemia in the inferior wall.\" Patient appears stable from this perspective at this time, denies any chest pain, dyspnea, palpitations, no signs/symptoms to suggest ACS. Continue ASA, atorvastatin, Metoprolol and Losartan as prescribed. Recommend close follow up with Cardiology within 1 week after discharge for reassessment to guide ongoing management decisions.     Prediabetes with Hyperglycemia  - Glucose elevated on most recent labs. Patient without known history of T2DM.  Most recent hemoglobin A1c 6.10% (09/10/23). Recommend that patient check her blood glucose 2-3 times daily and record those values in log book and take with her to next PCP " visit to allow for greater insight into treatment efficacy to guide further management decisions. Recommend consistent carbohydrate/diabetic diet. Recommend close follow up with PCP within 1 week after discharge for reassessment to guide ongoing management decisions.    Carotid peripheral vascular disease  Hyperlipidemia  - Most recent LDL 71 in August 2023. Carotid duplex negative for significant stenosis of left and right ICA. Continue Statin as prescribed.     Obstructive sleep apnea  - Continue use of home CPAP.    Day of Discharge     Subjective:  Patient seen and examined this morning.  Hospital day 2.  At time my examination, patient is awake, alert, sitting up in chair next to bedside.  She states that she feels well today, feels better when compared to prior.  No further episodes of syncope, she denies chest pain, dyspnea, palpitations, dizziness, lightheadedness at this time.  Evaluated and cleared for discharge by cardiology with plans for close outpatient follow-up.    Physical Exam:  Temp:  [97.9 °F (36.6 °C)-98.2 °F (36.8 °C)] 98.1 °F (36.7 °C)  Heart Rate:  [53-67] 67  Resp:  [18] 18  BP: (130-162)/(58-72) 130/58  Body mass index is 49.2 kg/m².  Physical Exam  Vitals and nursing note reviewed.   Constitutional:       General: She is not in acute distress.     Appearance: She is obese.   Cardiovascular:      Rate and Rhythm: Normal rate and regular rhythm.      Pulses: Normal pulses.      Heart sounds: Normal heart sounds.   Pulmonary:      Effort: Pulmonary effort is normal. No respiratory distress.      Breath sounds: Normal breath sounds. No wheezing.   Abdominal:      General: Bowel sounds are normal. There is no distension.      Palpations: Abdomen is soft.      Tenderness: There is no abdominal tenderness.   Musculoskeletal:      Right lower leg: No edema.      Left lower leg: No edema.   Skin:     General: Skin is warm and dry.      Coloration: Skin is pale.   Neurological:      General: No  focal deficit present.      Mental Status: She is alert and oriented to person, place, and time.       Consultants     Consult Orders (all) (From admission, onward)       Start     Ordered    09/10/23 1538  Inpatient Cardiology Consult  Once        Specialty:  Cardiology  Provider:  Sammy Gutierrez MD    09/10/23 1543    09/10/23 1352  [facility] (on-call MD unless specified) Details  Once        Specialty:  Hospitalist  Provider:  Elizabeth Ross MD    09/10/23 1351                  Procedures     * Surgery not found *    Imaging Results (All)       Procedure Component Value Units Date/Time    XR Chest PA & Lateral [[id number]] Collected: 09/11/23 1624     Updated: 09/11/23 1632    Narrative:      XR CHEST PA AND LATERAL-     HISTORY: Female who is 78 years-old, possible pneumonia     TECHNIQUE: Frontal and lateral views of the chest     COMPARISON: 9/10/2023     FINDINGS: Lung volume is low may exaggerate appearance of mediastinal  prominence. The heart is enlarged with mild pulmonary vascular  congestion. Mild right basilar atelectasis or infiltrate, follow-up  suggested. No pleural effusion or pneumothorax. No acute osseous  process.       Impression:      As described.           This report was finalized on 9/11/2023 4:29 PM by Dr. Mario Alberto Schrader M.D.       CT Head Without Contrast [[id number]] Collected: 09/10/23 1335     Updated: 09/10/23 1343    Narrative:      CT HEAD WO CONTRAST-     INDICATIONS: Trauma     TECHNIQUE: Radiation dose reduction techniques were utilized, including  automated exposure control and exposure modulation based on body size.  Noncontrast head CT     COMPARISON: 2/6/2016     FINDINGS:           No acute intracranial hemorrhage, midline shift or mass effect. No acute  territorial infarct is identified.     Moderate periventricular hypodensities suggest chronic small vessel  ischemic change in a patient this age.     Arterial calcifications are seen at the base of the brain.      Ventricles, cisterns, cerebral sulci are unremarkable for patient age.     The visualized paranasal sinuses, orbits, mastoid air cells are  unremarkable. Subcutaneous soft tissue swelling is apparent at the  posterior right scalp.             Impression:         No acute intracranial hemorrhage or hydrocephalus. Chronic changes of  the brain. If there is further clinical concern, MRI could be considered  for further evaluation.     This report was finalized on 9/10/2023 1:40 PM by Dr. Mario Alberto Schrader M.D.       XR Chest 1 View [[id number]] Collected: 09/10/23 1249     Updated: 09/10/23 1253    Narrative:      XR CHEST 1 VW-     HISTORY: 78-year-old female status post fall. Hit head.     FINDINGS: Limited by the apical lordotic projection and patient's body  habitus. There is no evidence for CHF or pneumothorax. Increased density  in both mid and lower lung fields is likely related to positioning.  Follow-up with PA and lateral views of the chest is recommended when the  patient is able.     This report was finalized on 9/10/2023 12:50 PM by Dr. Tosin Simon M.D.             Results for orders placed during the hospital encounter of 09/10/23    Duplex Carotid Ultrasound CAR    Interpretation Summary    Right internal carotid artery demonstrates normal flow without evidence of hemodynamically significant stenosis.    Left internal carotid artery demonstrates normal flow without evidence of hemodynamically significant stenosis.    Results for orders placed during the hospital encounter of 02/07/22    Adult Transthoracic Echo Complete W/ Cont if Necessary Per Protocol    Interpretation Summary  · Estimated right ventricular systolic pressure from tricuspid regurgitation is normal (<35 mmHg). Calculated right ventricular systolic pressure from tricuspid regurgitation is 22 mmHg.  · Estimated left ventricular EF = 60% Left ventricular systolic function is normal.  · Left ventricular wall thickness is consistent with  moderate concentric hypertrophy.  · Left ventricular diastolic function is consistent with (grade I) impaired relaxation.    Pertinent Labs     Results from last 7 days   Lab Units 09/12/23  0448 09/11/23  0451 09/10/23  1231   WBC 10*3/mm3 6.22 5.86 6.79   HEMOGLOBIN g/dL 11.9* 12.2 13.4   PLATELETS 10*3/mm3 146 148 160     Results from last 7 days   Lab Units 09/12/23 0448 09/11/23  0451 09/10/23  1231   SODIUM mmol/L 143 142 144   POTASSIUM mmol/L 3.5 3.7 3.7   CHLORIDE mmol/L 110* 110* 112*   CO2 mmol/L 24.2 24.0 22.0   BUN mg/dL 15 11 13   CREATININE mg/dL 0.64 0.59 0.59   GLUCOSE mg/dL 144* 118* 119*   EGFR mL/min/1.73 91.2 93.0 93.0     Results from last 7 days   Lab Units 09/10/23  1231   ALBUMIN g/dL 3.8   BILIRUBIN mg/dL 0.8   ALK PHOS U/L 125*   AST (SGOT) U/L 11   ALT (SGPT) U/L 9     Results from last 7 days   Lab Units 09/12/23 0448 09/11/23  0451 09/10/23  1231   CALCIUM mg/dL 8.3* 8.7 8.9   ALBUMIN g/dL  --   --  3.8     Results from last 7 days   Lab Units 09/10/23  1231   LIPASE U/L 17     Results from last 7 days   Lab Units 09/10/23  1231   HSTROP T ng/L 9           Invalid input(s): LDLCALC  Results from last 7 days   Lab Units 09/10/23  1255   URINECX  >100,000 CFU/mL Klebsiella pneumoniae ssp pneumoniae*         Test Results Pending at Discharge     Pending Labs       Order Current Status    Blood Culture - Blood, Arm, Left In process    Blood Culture - Blood, Arm, Left In process            Discharge Details        Discharge Medications        New Medications        Instructions Start Date   amLODIPine 5 MG tablet  Commonly known as: NORVASC   5 mg, Oral, Every 24 Hours Scheduled   Start Date: September 13, 2023     cefdinir 300 MG capsule  Commonly known as: OMNICEF   300 mg, Oral, 2 Times Daily             Changes to Medications        Instructions Start Date   carvedilol 12.5 MG tablet  Commonly known as: COREG  What changed:   medication strength  how much to take  when to take this    "12.5 mg, Oral, 2 Times Daily With Meals             Continue These Medications        Instructions Start Date   aspirin 81 MG tablet   1 tablet, Oral, Daily      atorvastatin 40 MG tablet  Commonly known as: LIPITOR   TAKE 1 TABLET DAILY      losartan 100 MG tablet  Commonly known as: COZAAR   TAKE 1 TABLET EVERY NIGHT      NON FORMULARY   C PAP                Allergies   Allergen Reactions    Codeine      \"makes me loopy\"    Iodinated Contrast Media      itching    Hctz [Hydrochlorothiazide] Nausea Only    Sulfa Antibiotics Unknown (See Comments)     NOT SURE       Discharge Disposition:  Home or Self Care      Discharge Diet:  Diet Order   Procedures    Diet: Cardiac Diets; Healthy Heart (2-3 Na+); Texture: Regular Texture (IDDSI 7); Fluid Consistency: Thin (IDDSI 0)       Discharge Activity:   Activity Instructions       Gradually Increase Activity Until at Pre-Hospitalization Level      Up WIth Assist              CODE STATUS:    Code Status and Medical Interventions:   Ordered at: 09/10/23 1543     Code Status (Patient has no pulse and is not breathing):    CPR (Attempt to Resuscitate)     Medical Interventions (Patient has pulse or is breathing):    Full Support       Future Appointments   Date Time Provider Department Center   10/2/2023  1:45 PM Pina Maxwell APRN [facility] [place]   9/10/2024  1:00 PM Anastasiia Callahan MD [facility] [place]     Additional Instructions for the Follow-ups that You Need to Schedule       Discharge Follow-up with PCP   As directed       Currently Documented PCP:    Provider, No Known    PCP Phone Number:    [phone]     Follow Up Details: Within 1 week after discharge for reassessment, medication and symptom review, blood pressure and blood glucose check, CMP and CBC        Discharge Follow-up with Specialty: Cardiology   As directed      Specialty: Cardiology   Follow Up Details: Within 1 to 2 weeks after discharge or as scheduled.  Please call their office to ensure a " follow-up appointment is made for you.               Follow-up Information       Provider, No Known .    Why: Within 1 week after discharge for reassessment, medication and symptom review, blood pressure and blood glucose check, CMP and CBC  Contact information:  [facility] [id number]  [phone]                             Additional Instructions for the Follow-ups that You Need to Schedule       Discharge Follow-up with PCP   As directed       Currently Documented PCP:    Provider, No Known    PCP Phone Number:    [phone]     Follow Up Details: Within 1 week after discharge for reassessment, medication and symptom review, blood pressure and blood glucose check, CMP and CBC        Discharge Follow-up with Specialty: Cardiology   As directed      Specialty: Cardiology   Follow Up Details: Within 1 to 2 weeks after discharge or as scheduled.  Please call their office to ensure a follow-up appointment is made for you.            Time Spent on Discharge:  Greater than 30 minutes      Ottoniel Lopez DO  [facility]  09/12/23  15:13 EDT

## 2023-09-12 NOTE — PLAN OF CARE
Problem: Adult Inpatient Plan of Care  Goal: Plan of Care Review  Outcome: Ongoing, Progressing  Goal: Patient-Specific Goal (Individualized)  Outcome: Ongoing, Progressing  Goal: Absence of Hospital-Acquired Illness or Injury  Outcome: Ongoing, Progressing  Intervention: Identify and Manage Fall Risk  Recent Flowsheet Documentation  Taken 9/12/2023 0000 by Dipti Rutledge RN  Safety Promotion/Fall Prevention:   safety round/check completed   room organization consistent   nonskid shoes/slippers when out of bed   lighting adjusted   fall prevention program maintained   activity supervised  Taken 9/11/2023 2200 by Dipti Rutledge RN  Safety Promotion/Fall Prevention:   safety round/check completed   room organization consistent   nonskid shoes/slippers when out of bed   lighting adjusted   fall prevention program maintained   activity supervised  Taken 9/11/2023 2045 by Dipti Rutledge RN  Safety Promotion/Fall Prevention:   safety round/check completed   room organization consistent   nonskid shoes/slippers when out of bed   lighting adjusted   fall prevention program maintained   activity supervised  Intervention: Prevent Skin Injury  Recent Flowsheet Documentation  Taken 9/12/2023 0000 by Dipti Rutledge RN  Body Position: position changed independently  Taken 9/11/2023 2200 by Dipti Rutledge RN  Body Position: position changed independently  Taken 9/11/2023 2045 by Dipti Rutledge RN  Body Position: position changed independently  Intervention: Prevent and Manage VTE (Venous Thromboembolism) Risk  Recent Flowsheet Documentation  Taken 9/12/2023 0000 by Dipti Rutledge RN  Activity Management: activity encouraged  Taken 9/11/2023 2200 by Dipti Rutledge RN  Activity Management: activity encouraged  Taken 9/11/2023 2045 by Dipti Rutledge RN  Activity Management: activity encouraged  VTE Prevention/Management: (Lovenox) other (see comments)  Range of Motion: active ROM (range of motion) encouraged  Intervention:  Prevent Infection  Recent Flowsheet Documentation  Taken 9/12/2023 0000 by Dipti Rutledge, RN  Infection Prevention:   environmental surveillance performed   rest/sleep promoted   single patient room provided  Taken 9/11/2023 2200 by Dipti Rutledge RN  Infection Prevention:   environmental surveillance performed   rest/sleep promoted   single patient room provided  Taken 9/11/2023 2045 by Dipti Rutledge, RN  Infection Prevention:   environmental surveillance performed   hand hygiene promoted   rest/sleep promoted   single patient room provided  Goal: Optimal Comfort and Wellbeing  Outcome: Ongoing, Progressing  Intervention: Provide Person-Centered Care  Recent Flowsheet Documentation  Taken 9/11/2023 2045 by Dipti Rutledge, RN  Trust Relationship/Rapport:   care explained   choices provided   emotional support provided   empathic listening provided   questions answered   questions encouraged   reassurance provided   thoughts/feelings acknowledged  Goal: Readiness for Transition of Care  Outcome: Ongoing, Progressing     Problem: Hypertension Comorbidity  Goal: Blood Pressure in Desired Range  Outcome: Ongoing, Progressing  Intervention: Maintain Blood Pressure Management  Recent Flowsheet Documentation  Taken 9/11/2023 2045 by Dipti Rutledge RN  Medication Review/Management: medications reviewed     Problem: Fall Injury Risk  Goal: Absence of Fall and Fall-Related Injury  Outcome: Ongoing, Progressing  Intervention: Identify and Manage Contributors  Recent Flowsheet Documentation  Taken 9/11/2023 2045 by Dipti Rutledge, RN  Medication Review/Management: medications reviewed  Intervention: Promote Injury-Free Environment  Recent Flowsheet Documentation  Taken 9/12/2023 0000 by Dipti Rutledge, RN  Safety Promotion/Fall Prevention:   safety round/check completed   room organization consistent   nonskid shoes/slippers when out of bed   lighting adjusted   fall prevention program maintained   activity  supervised  Taken 9/11/2023 2200 by Dipti Rutledge, RN  Safety Promotion/Fall Prevention:   safety round/check completed   room organization consistent   nonskid shoes/slippers when out of bed   lighting adjusted   fall prevention program maintained   activity supervised  Taken 9/11/2023 2045 by Dipti Rutledge, RN  Safety Promotion/Fall Prevention:   safety round/check completed   room organization consistent   nonskid shoes/slippers when out of bed   lighting adjusted   fall prevention program maintained   activity supervised   Goal Outcome Evaluation:

## 2023-09-12 NOTE — NURSING NOTE
Patient is A&O X4, patient reports she is feeling much better and hoping to go home today. Patient has rested quietly all night. Patient does run sinus mechelle in the high 40s. No events over night. Will continue to monitor patient.

## 2023-09-12 NOTE — PROGRESS NOTES
"    Patient Name: Julee King  :1945  78 y.o.      Patient Care Team:  Provider, No Known as PCP - Anastasiia Horn MD as Cardiologist (Cardiology)  Alvarado Rivera MD as Consulting Physician (Gastroenterology)  Bianka Galaviz MD as Consulting Physician (Pulmonary Disease)    Chief Complaint: follow up syncope    Interval History: she feels well. She is sitting up in the chair playing cards.       Objective   Vital Signs  Temp:  [97.9 °F (36.6 °C)-98.2 °F (36.8 °C)] 98.2 °F (36.8 °C)  Heart Rate:  [53-58] 53  Resp:  [18] 18  BP: (134-165)/(67-79) 162/67    Intake/Output Summary (Last 24 hours) at 2023 1223  Last data filed at 2023 2300  Gross per 24 hour   Intake 480 ml   Output --   Net 480 ml     Flowsheet Rows      Flowsheet Row First Filed Value   Admission Height 154.9 cm (61\") Documented at 09/10/2023 1203   Admission Weight 118 kg (261 lb) Documented at 09/10/2023 1203            Physical Exam:   General Appearance:    Alert, cooperative, in no acute distress   Lungs:     Clear to auscultation.  Normal respiratory effort and rate.      Heart:    Regular rhythm and normal rate, normal S1 and S2, no murmurs, gallops or rubs.     Chest Wall:    No abnormalities observed   Abdomen:     Soft, nontender, positive bowel sounds.     Extremities:   no cyanosis, clubbing or edema.  No marked joint deformities.  Adequate musculoskeletal strength.       Results Review:    Results from last 7 days   Lab Units 23  0448   SODIUM mmol/L 143   POTASSIUM mmol/L 3.5   CHLORIDE mmol/L 110*   CO2 mmol/L 24.2   BUN mg/dL 15   CREATININE mg/dL 0.64   GLUCOSE mg/dL 144*   CALCIUM mg/dL 8.3*     Results from last 7 days   Lab Units 09/10/23  1231   HSTROP T ng/L 9     Results from last 7 days   Lab Units 23  0448   WBC 10*3/mm3 6.22   HEMOGLOBIN g/dL 11.9*   HEMATOCRIT % 37.3   PLATELETS 10*3/mm3 146     Results from last 7 days   Lab Units 09/10/23  1231   INR  1.09               "         Medication Review:   amLODIPine, 5 mg, Oral, Q24H  aspirin, 81 mg, Oral, Daily  atorvastatin, 40 mg, Oral, Nightly  carvedilol, 12.5 mg, Oral, BID With Meals  cefTRIAXone, 2,000 mg, Intravenous, Q24H  enoxaparin, 40 mg, Subcutaneous, Q12H  losartan, 100 mg, Oral, Nightly  potassium chloride ER, 40 mEq, Oral, Q4H  senna-docusate sodium, 2 tablet, Oral, BID  sodium chloride, 10 mL, Intravenous, Q12H              Assessment & Plan   Syncope , etiology uncertain. Echo pending. Plan to discharge with mobile tele monitor. Not orthostatic.   Coronary artery disease with previous CABG, stable without angina. Last cath 2019. 2/4 grafts patent. Stress 2022 with mild ischemia in the inferior wall.  Chronic diastolic heart failure, compensated currently  Hypertension - BP on the higher side.  Hyperlipidemia   Sinus mechelle - asymptomatic.    Echo pending.   If ok - no objection to dc with 2 week monitor.    MANDIE Villar  [facility]  09/12/23  12:23 EDT    Echo    Left ventricular systolic function is normal. Left ventricular ejection fraction appears to be 56 - 60%.    Left ventricular diastolic function is consistent with (grade I) impaired relaxation.    The right ventricular cavity is mildly dilated. Normal right ventricular systolic function noted.    The left atrial cavity is mild to moderately dilated.    No aortic valve stenosis is present.    Calculated right ventricular systolic pressure from tricuspid regurgitation is 11 mmHg.    There is a trivial pericardial effusion.      Ok for dc. Will arrange 4 week appt in our office to review Zio.

## 2023-09-13 ENCOUNTER — NURSE TRIAGE (OUTPATIENT)
Dept: CALL CENTER | Facility: HOSPITAL | Age: 78
End: 2023-09-13
Payer: MEDICARE

## 2023-09-13 ENCOUNTER — TRANSITIONAL CARE MANAGEMENT TELEPHONE ENCOUNTER (OUTPATIENT)
Dept: CALL CENTER | Facility: HOSPITAL | Age: 78
End: 2023-09-13
Payer: MEDICARE

## 2023-09-13 NOTE — TELEPHONE ENCOUNTER
Reason for Disposition   [1] Caller has medicine question about med NOT prescribed by PCP AND [2] triager unable to answer question (e.g., compatibility with other med, storage)    Additional Information   Negative: [1] Intentional drug overdose AND [2] suicidal thoughts or ideas   Negative: Drug overdose and triager unable to answer question   Negative: Caller requesting a renewal or refill of a medicine patient is currently taking   Negative: Caller requesting information unrelated to medicine   Negative: Caller requesting information about COVID-19 Vaccine   Negative: Caller requesting information about Emergency Contraception   Negative: Caller requesting information about Combined Birth Control Pills   Negative: Caller requesting information about Progestin Birth Control Pills   Negative: Caller requesting information about Post-Op pain or medicines   Negative: Caller requesting a prescription antibiotic (such as Penicillin) for Strep throat and has a positive culture result   Negative: Caller requesting a prescription anti-viral med (such as Tamiflu) and has influenza (flu) symptoms   Negative: Immunization reaction suspected   Negative: Rash while taking a medicine or within 3 days of stopping it   Negative: [1] Asthma and [2] having symptoms of asthma (cough, wheezing, etc.)   Negative: [1] Symptom of illness (e.g., headache, abdominal pain, earache, vomiting) AND [2] more than mild   Negative: Breastfeeding questions about mother's medicines and diet   Negative: MORE THAN A DOUBLE DOSE of a prescription or over-the-counter (OTC) drug   Negative: [1] DOUBLE DOSE (an extra dose or lesser amount) of prescription drug AND [2] any symptoms (e.g., dizziness, nausea, pain, sleepiness)   Negative: [1] DOUBLE DOSE (an extra dose or lesser amount) of over-the-counter (OTC) drug AND [2] any symptoms (e.g., dizziness, nausea, pain, sleepiness)   Negative: Took another person's prescription drug   Negative: [1] DOUBLE  "DOSE (an extra dose or lesser amount) of prescription drug AND [2] NO symptoms  (Exception: A double dose of antibiotics.)   Negative: Diabetes drug error or overdose (e.g., took wrong type of insulin or took extra dose)   Negative: [1] Prescription not at pharmacy AND [2] was prescribed by PCP recently (Exception: Triager has access to EMR and prescription is recorded there. Go to Home Care and confirm for pharmacy.)   Negative: [1] Pharmacy calling with prescription question AND [2] triager unable to answer question   Negative: [1] Caller has URGENT medicine question about med that PCP or specialist prescribed AND [2] triager unable to answer question   Negative: Medicine patch causing local rash or itching    Answer Assessment - Initial Assessment Questions  1. NAME of MEDICINE: \"What medicine(s) are you calling about?\"      Norvasc, Cefdinir, Carvedilol    2. QUESTION: \"What is your question?\" (e.g., double dose of medicine, side effect)      [facility]     3. PRESCRIBER: \"Who prescribed the medicine?\" Reason: if prescribed by specialist, call should be referred to that group.      Hospitalist  4. SYMPTOMS: \"Do you have any symptoms?\" If Yes, ask: \"What symptoms are you having?\"  \"How bad are the symptoms (e.g., mild, moderate, severe)      na  5. PREGNANCY:  \"Is there any chance that you are pregnant?\" \"When was your last menstrual period?\"      na    Protocols used: Medication Question Call-ADULT-    "

## 2023-09-13 NOTE — OUTREACH NOTE
Prep Survey      Flowsheet Row Responses   [facility] patient discharged from? [place]   Is LACE score < 7 ? No   Eligibility [facility]   Date of Admission 09/10/23   Date of Discharge 09/12/23   Discharge Disposition Home or Self Care   Discharge diagnosis Syncope and collapse   Does the patient have one of the following disease processes/diagnoses(primary or secondary)? Other   Does the patient have Home health ordered? No   Is there a DME ordered? No   Prep survey completed? Yes            Ruma CEE - Registered Nurse

## 2023-09-13 NOTE — TELEPHONE ENCOUNTER
Medication Problem 09/13/2023 Caller states her discharge prescriptions are not at her pharmacy, [facility] [id number] [facility].    Reviewed of chart Shows prescription confirmed receipt by pharmacy at 4:41 pn 09/12/2023. Called [facility] and Spoke with Dom and advised of this, states he can take verbal order. Read prescription details to him and he states he will have these ready by 4 pm.   Called patient and let her know prescriptions will be ready by 4 pm.

## 2023-09-13 NOTE — PROGRESS NOTES
Case Management Discharge Note      Final Note: Dc home no needs    Provided Post Acute Provider List?: N/A  N/A Provider List Comment: No needs identified  Provided Post Acute Provider Quality & Resource List?: N/A    Selected Continued Care - Discharged on 9/12/2023 Admission date: 9/10/2023 - Discharge disposition: Home or Self Care      Destination    No services have been selected for the patient.                Durable Medical Equipment    No services have been selected for the patient.                Dialysis/Infusion    No services have been selected for the patient.                Home Medical Care    No services have been selected for the patient.                Therapy    No services have been selected for the patient.                Community Resources    No services have been selected for the patient.                Community & DME    No services have been selected for the patient.                         Final Discharge Disposition Code: 01 - home or self-care

## 2023-09-13 NOTE — OUTREACH NOTE
Call Center TCM Note      Flowsheet Row Responses   [facility] [place] patient discharged from? [place]   Does the patient have one of the following disease processes/diagnoses(primary or secondary)? Other   TCM attempt successful? Yes   Call start time 0906   Call end time 0909   Discharge diagnosis Syncope and collapse   Meds reviewed with patient/caregiver? Yes   Does the patient have all medications ordered at discharge? No   Nursing Interventions No intervention needed   Is the patient taking all medications as directed (includes completed medication regime)? N/A   Medication comments States she is going to  at [facility] today   Comments Pt has new pt appt on 10/2 with CHRISTOPHER Pemberton @ 1:45.  This will be out of the TCM time frame.   Does the patient have an appointment with their PCP within 7-14 days of discharge? Other   Nursing Interventions Confirmed date/time of appointment   Psychosocial issues? No   Did the patient receive a copy of their discharge instructions? Yes   Nursing interventions Reviewed instructions with patient   What is the patient's perception of their health status since discharge? Improving   Is the patient/caregiver able to teach back signs and symptoms related to disease process for when to call PCP? Yes   Is the patient/caregiver able to teach back signs and symptoms related to disease process for when to call 911? Yes   Is the patient/caregiver able to teach back the hierarchy of who to call/visit for symptoms/problems? PCP, Specialist, Home health nurse, Urgent Care, ED, 911 Yes   Additional teach back comments States she is doing well.  She will discuss glucometer with new PCP.   TCM call completed? Yes   Wrap up additional comments Will message PCP office for sooner appt to be in the TCM time frame.   Call end time 0909            Esme Ryan LPN    9/13/2023, 09:12 EDT

## 2023-09-16 LAB
BACTERIA SPEC AEROBE CULT: NORMAL
BACTERIA SPEC AEROBE CULT: NORMAL

## 2023-09-18 ENCOUNTER — TELEPHONE (OUTPATIENT)
Dept: FAMILY MEDICINE CLINIC | Facility: CLINIC | Age: 78
End: 2023-09-18
Payer: MEDICARE

## 2023-09-18 NOTE — TELEPHONE ENCOUNTER
Caller: Julee King    Relationship to patient: Self    Best call back number: [phone]     Date of exposure: PATIENT WAS DISCHARGED FROM A HOSPITAL 9/12/2023 AND POSSIBLY CONTRACTED COVID THERE.    Date of positive COVID19 test: 9/17/2023    COVID19 symptoms: COUGHING, SNEEZING, RUNNY NOSE    Date of initial quarantine: 9/17/2023    Additional information or concerns: PATIENT'S  HAS PSP, THE PATIENT WILL NEED CURED SOON SO THAT SHE DOESN'T RUN A CYCLE OF GIVING HER  COVID-19.    What is the patients preferred pharmacy: [id number] [place] - [address] AT [facility] [phone] [facility] [phone] FX

## 2023-09-19 DIAGNOSIS — R09.81 NASAL CONGESTION: ICD-10-CM

## 2023-09-19 DIAGNOSIS — R05.1 ACUTE COUGH: Primary | ICD-10-CM

## 2023-09-19 RX ORDER — GUAIFENESIN 600 MG/1
1200 TABLET, EXTENDED RELEASE ORAL 2 TIMES DAILY
Qty: 14 TABLET | Refills: 0 | Status: SHIPPED | OUTPATIENT
Start: 2023-09-19

## 2023-09-19 RX ORDER — BENZONATATE 100 MG/1
100 CAPSULE ORAL 3 TIMES DAILY PRN
Qty: 15 CAPSULE | Refills: 0 | Status: SHIPPED | OUTPATIENT
Start: 2023-09-19 | End: 2023-09-24

## 2023-09-21 ENCOUNTER — READMISSION MANAGEMENT (OUTPATIENT)
Dept: CALL CENTER | Facility: HOSPITAL | Age: 78
End: 2023-09-21
Payer: MEDICARE

## 2023-09-21 NOTE — OUTREACH NOTE
Medical Week 2 Survey      Flowsheet Row Responses   [facility] patient discharged from? [place]   Does the patient have one of the following disease processes/diagnoses(primary or secondary)? Other   Week 2 attempt successful? Yes   Call start time 1650   Discharge diagnosis Syncope and collapse, Acute UTI   Call end time 1656   Person spoke with today (if not patient) and relationship patient   Meds reviewed with patient/caregiver? Yes   Does the patient have all medications ordered at discharge? Yes   Is the patient taking all medications as directed (includes completed medication regime)? Yes   Medication comments Patient reports that physician called in some cough med for her.   Does the patient have a primary care provider?  Yes   Does the patient have an appointment with their PCP within 7 days of discharge? Greater than 7 days   Comments regarding PCP New Patient with Pina Maxwell, APRN Monday Oct 2, 2023 1:45 PM   Nursing Interventions Verified appointment date/time/provider   Has the patient kept scheduled appointments due by today? N/A   Comments Cardiology appt 10/12   Has home health visited the patient within 72 hours of discharge? N/A   Psychosocial issues? No   Did the patient receive a copy of their discharge instructions? Yes   Nursing interventions Reviewed instructions with patient   What is the patient's perception of their health status since discharge? New symptoms unrelated to diagnosis  [Patient reports that she has COVID. Reports cough and congestion. ]   Is the patient/caregiver able to teach back signs and symptoms related to disease process for when to call PCP? Yes   Is the patient/caregiver able to teach back signs and symptoms related to disease process for when to call 911? Yes   Is the patient/caregiver able to teach back the hierarchy of who to call/visit for symptoms/problems? PCP, Specialist, Home health nurse, Urgent Care, ED, 911 Yes   If the patient is a current  smoker, are they able to teach back resources for cessation? Not a smoker   Week 2 Call Completed? Yes   Is the patient interested in additional calls from an ambulatory ? No   Would this patient benefit from a Referral to [facility] Social Work? No   Call end time [phone]            TL JOEL - Registered Nurse

## 2023-10-02 ENCOUNTER — OFFICE VISIT (OUTPATIENT)
Dept: FAMILY MEDICINE CLINIC | Facility: CLINIC | Age: 78
End: 2023-10-02
Payer: MEDICARE

## 2023-10-02 ENCOUNTER — PATIENT ROUNDING (BHMG ONLY) (OUTPATIENT)
Dept: FAMILY MEDICINE CLINIC | Facility: CLINIC | Age: 78
End: 2023-10-02
Payer: MEDICARE

## 2023-10-02 VITALS
WEIGHT: 255.2 LBS | HEART RATE: 56 BPM | SYSTOLIC BLOOD PRESSURE: 120 MMHG | TEMPERATURE: 97.1 F | BODY MASS INDEX: 48.18 KG/M2 | HEIGHT: 61 IN | DIASTOLIC BLOOD PRESSURE: 68 MMHG | OXYGEN SATURATION: 96 %

## 2023-10-02 DIAGNOSIS — Z09 HOSPITAL DISCHARGE FOLLOW-UP: Primary | ICD-10-CM

## 2023-10-02 DIAGNOSIS — R31.9 HEMATURIA, UNSPECIFIED TYPE: ICD-10-CM

## 2023-10-02 DIAGNOSIS — R00.1 SYMPTOMATIC SINUS BRADYCARDIA: ICD-10-CM

## 2023-10-02 DIAGNOSIS — I10 ESSENTIAL HYPERTENSION: ICD-10-CM

## 2023-10-02 DIAGNOSIS — R73.03 PREDIABETES: ICD-10-CM

## 2023-10-02 DIAGNOSIS — N39.0 UTI DUE TO KLEBSIELLA SPECIES: ICD-10-CM

## 2023-10-02 DIAGNOSIS — Z76.89 ENCOUNTER TO ESTABLISH CARE: ICD-10-CM

## 2023-10-02 DIAGNOSIS — R55 SYNCOPE AND COLLAPSE: ICD-10-CM

## 2023-10-02 DIAGNOSIS — Z86.16 PERSONAL HISTORY OF COVID-19: ICD-10-CM

## 2023-10-02 DIAGNOSIS — B96.89 UTI DUE TO KLEBSIELLA SPECIES: ICD-10-CM

## 2023-10-02 LAB
BILIRUB BLD-MCNC: NEGATIVE MG/DL
CLARITY, POC: CLEAR
COLOR UR: YELLOW
EXPIRATION DATE: ABNORMAL
GLUCOSE UR STRIP-MCNC: NEGATIVE MG/DL
KETONES UR QL: NEGATIVE
LEUKOCYTE EST, POC: NEGATIVE
Lab: ABNORMAL
NITRITE UR-MCNC: NEGATIVE MG/ML
PH UR: 5.5 [PH] (ref 5–8)
PROT UR STRIP-MCNC: NEGATIVE MG/DL
RBC # UR STRIP: ABNORMAL /UL
SP GR UR: 1.02 (ref 1–1.03)
UROBILINOGEN UR QL: ABNORMAL

## 2023-10-02 NOTE — PROGRESS NOTES
"Chief Complaint  Establish Care (Pt is here today to establish care, pt presents syncope from 9/10 and is feeling weak.)    Subjective        Julee King presents to [facility]    History of Present Illness  78 year old female presents to establish care. Pt is new to me, previously seen by Dr. Lee. Pt has hypertension and currently taking losartan, carvedilol, and amlodipine. Pts BP well controlled in clinic today and improving with treatment. Pt is pre-diabetic with most recent Hemoglobin A1c 6.10 on 9/10/23. Pt was seen in emergency room from 9/10 to 9/12 for syncope and collapse. Pt was diagnosed with klebsiella pneumoniae urinary tract infection. Pt states she continues to feel weak but was COVID positive on 9/12. Encouraged hydration and plenty of rest. POC urinalysis in clinic shows trace of blood but no leukocytes or nitrites. Cardiology was consulted during hospital stay. Was found to have asymptomatic sinus bradycardia. Has appt with MANDIE Rodriguez cardiology on 10/12/2023. Pt was discharged home with amlodipine and cefdinir. Pt denies chest pain and shortness of breath.     Objective   Vital Signs:  /68   Pulse 56   Temp 97.1 °F (36.2 °C)   Ht 154.9 cm (61\")   Wt 116 kg (255 lb 3.2 oz)   SpO2 96%   BMI 48.22 kg/m²   Estimated body mass index is 48.22 kg/m² as calculated from the following:    Height as of this encounter: 154.9 cm (61\").    Weight as of this encounter: 116 kg (255 lb 3.2 oz).       Class 3 Severe Obesity (BMI >=40). Obesity-related health conditions include the following: obstructive sleep apnea, hypertension, and prediabetes . Obesity is unchanged. BMI is is above average; BMI management plan is completed. We discussed portion control and increasing exercise.      Physical Exam  Constitutional:       Appearance: Normal appearance.   HENT:      Head: Normocephalic.   Eyes:      Conjunctiva/sclera: Conjunctivae normal.      Pupils: " Pupils are equal, round, and reactive to light.   Cardiovascular:      Rate and Rhythm: Regular rhythm. Bradycardia present.      Pulses: Normal pulses.      Heart sounds: Normal heart sounds.   Pulmonary:      Effort: Pulmonary effort is normal.      Breath sounds: Normal breath sounds.   Skin:     General: Skin is warm.   Neurological:      General: No focal deficit present.      Mental Status: She is alert and oriented to person, place, and time.   Psychiatric:         Mood and Affect: Mood normal.         Behavior: Behavior normal.        Result Review :  The following data was reviewed by: MANDIE Pemberton on 10/02/2023:  Common labs          9/10/2023    12:31 9/11/2023    04:51 9/12/2023    04:48   Common Labs   Glucose 119  118  144    BUN 13  11  15    Creatinine 0.59  0.59  0.64    Sodium 144  142  143    Potassium 3.7  3.7  3.5    Chloride 112  110  110    Calcium 8.9  8.7  8.3    Albumin 3.8      Total Bilirubin 0.8      Alkaline Phosphatase 125      AST (SGOT) 11      ALT (SGPT) 9      WBC 6.79  5.86  6.22    Hemoglobin 13.4  12.2  11.9    Hematocrit 41.4  37.4  37.3    Platelets 160  148  146    Hemoglobin A1C 6.10        Data reviewed : labs             Assessment and Plan   Diagnoses and all orders for this visit:    1. Hospital discharge follow-up (Primary)    2. Symptomatic sinus bradycardia    3. Syncope and collapse    4. UTI due to Klebsiella species  -     POCT urinalysis dipstick, automated    5. Essential hypertension  Assessment & Plan:  Hypertension is improving with treatment.  Continue current treatment regimen.  Dietary sodium restriction.  Weight loss.  Regular aerobic exercise.  Blood pressure will be reassessed in 3 months.      6. Prediabetes  Comments:  Hemoglobin A1c 6.10 on 9/10/23    7. Encounter to establish care    8. Hematuria, unspecified type  -     POCT urinalysis dipstick, automated    9. Personal history of COVID-19  Comments:  Positive test on 9/12             Follow  Up   Return in about 3 months (around 1/2/2024) for Next scheduled follow up.  Patient was given instructions and counseling regarding her condition or for health maintenance advice. Please see specific information pulled into the AVS if appropriate.

## 2023-10-12 ENCOUNTER — OFFICE VISIT (OUTPATIENT)
Dept: CARDIOLOGY | Facility: CLINIC | Age: 78
End: 2023-10-12
Payer: MEDICARE

## 2023-10-12 VITALS
BODY MASS INDEX: 48.64 KG/M2 | OXYGEN SATURATION: 97 % | HEART RATE: 58 BPM | WEIGHT: 257.6 LBS | DIASTOLIC BLOOD PRESSURE: 58 MMHG | SYSTOLIC BLOOD PRESSURE: 112 MMHG | HEIGHT: 61 IN

## 2023-10-12 DIAGNOSIS — E78.5 DYSLIPIDEMIA: ICD-10-CM

## 2023-10-12 DIAGNOSIS — N39.0 ACUTE UTI: ICD-10-CM

## 2023-10-12 DIAGNOSIS — R00.1 BRADYCARDIA: ICD-10-CM

## 2023-10-12 DIAGNOSIS — I25.10 CORONARY ARTERY DISEASE INVOLVING NATIVE CORONARY ARTERY OF NATIVE HEART WITHOUT ANGINA PECTORIS: ICD-10-CM

## 2023-10-12 DIAGNOSIS — R94.31 ABNORMAL EKG: ICD-10-CM

## 2023-10-12 DIAGNOSIS — E66.01 CLASS 3 SEVERE OBESITY DUE TO EXCESS CALORIES WITH SERIOUS COMORBIDITY AND BODY MASS INDEX (BMI) OF 45.0 TO 49.9 IN ADULT: ICD-10-CM

## 2023-10-12 DIAGNOSIS — I10 ESSENTIAL HYPERTENSION: ICD-10-CM

## 2023-10-12 DIAGNOSIS — G47.33 OBSTRUCTIVE APNEA: ICD-10-CM

## 2023-10-12 DIAGNOSIS — R55 SYNCOPE AND COLLAPSE: Primary | ICD-10-CM

## 2023-10-12 RX ORDER — CARVEDILOL 12.5 MG/1
12.5 TABLET ORAL 2 TIMES DAILY WITH MEALS
Qty: 180 TABLET | Refills: 2 | Status: SHIPPED | OUTPATIENT
Start: 2023-10-12

## 2023-10-12 RX ORDER — AMLODIPINE BESYLATE 5 MG/1
5 TABLET ORAL
Qty: 90 TABLET | Refills: 2 | Status: SHIPPED | OUTPATIENT
Start: 2023-10-12

## 2023-10-12 NOTE — PROGRESS NOTES
Date of Office Visit: 10/12/2023  Encounter Provider: MANDIE Silverio  Place of Service: [facility]  Patient Name: Julee King  :1945  Primary Cardiologist: Dr. Anastasiia Callahan     Chief Complaint   Patient presents with    Syncope    Hospital Follow Up Visit   :     HPI: Julee King is a 78 y.o. female who presents today for follow-up after recent syncopal episode.  I reviewed her medical records.    She has been diagnosed with hypertension, hyperlipidemia, obstructive sleep apnea on CPAP, and obesity.    In 2016, she was diagnosed with coronary artery disease, underwent CABG, and EF was 33%.  EF normalized.    In 2019, she presented the [facility] ED with dyspnea and chest heaviness.  Echocardiogram showed normal EF 58% and mild LVH.  Cardiac PET scan was abnormal. She underwent cardiac catheterization which revealed multivessel coronary artery disease with 2/4 patent grafts with medical treatment recommended. Dr. Almeida said if medical treatment fails we could consider PCI of the distal left main to help with perfusion of the distal left circumflex.     In 2022, she was experiencing dyspnea. Echocardiogram showed normal LVEF, moderate LVH, grade 1 diastolic dysfunction, no wall motion abnormalities. Cardiac PET scan showed small to medium size, mildly severe area of ischemia in the inferior wall.  Dr. Callahan reviewed with Dr. Almeida who did her last heart catheterization and they recommended medical treatment.  She was tried on furosemide, but was intolerant to the medication.    In 2023, she followed up with me in the office.  She reported chronic shortness of breath and lower extremity edema which were unchanged.      Three days after she saw me, she presented to the hospital after having a syncopal episode.  She said she took a gulp of Coke which got caught in her esophagus and she could not breathe.  She  then passed out.  There was concern that she may be having bradycardia, with the lowest heart rate never got was 48 bpm.  Carvedilol was decreased to 12.5 mg twice per day echocardiogram showed EF normal, grade 1 diastolic dysfunction, RV mildly dilated, left atrium mild to moderately dilated, and trace MR/TR.  She was diagnosed with a UTI and treated with antibiotics.  Her blood pressure was elevated and amlodipine was added to her regimen.    She was discharged and an outpatient Zio patch monitor which she wore for 14 days with the following results: Normal sinus rhythm, average heart rate 70, minimum 41 and maximum 200, short burst of atrial tachycardia, occasional APCs, rare PVCs, and one 4 beat run of nonsustained ventricular tachycardia.    She presents today for follow-up visit.  She says she is breathing better now that her carvedilol was decreased.  Heart rates is stable.  She denies dizziness, lightheadedness, syncope, chest pain, or palpitations.  Blood pressure and heart rate normal.    Past Medical History:   Diagnosis Date    Bronchitis 05/2016    Cardiomyopathy     Carotid artery disease     16-49% stenosis of the left internal carotid 2016; carotid duplex 1/2017 - normal    Cataract 2015    Cholelithiasis 2015    Colon polyps     Coronary artery disease 01/2016    Non-STEMI and status post CABG    Diverticulosis     Dyslipidemia     GI bleed     Severely anemic and received a blood transfusion    Grade I diastolic dysfunction 02/08/2022    Per echocardiogram 2/2022    Headache     Hiatal hernia     Hyperlipidemia     Hypertension     Hypocalcemia     Hypokalemia     LVH (left ventricular hypertrophy) 02/08/2022    Mild to moderate per echo 2/2022    Non-STEMI (non-ST elevated myocardial infarction) 01/2016    Obesity     LOVE (obstructive sleep apnea)     moderate to severe; compliant with CPAP machine    PVC (premature ventricular contraction)        Past Surgical History:   Procedure Laterality Date     CARDIAC CATHETERIZATION N/A 02/11/2019    Procedure: Coronary angiography;  Surgeon: Cheryl Almeida MD;  Location:  [facility] CATH INVASIVE LOCATION;  Service: Cardiovascular    CARDIAC CATHETERIZATION N/A 02/11/2019    Procedure: Left Heart Cath;  Surgeon: Cheryl Almeida MD;  Location:  [facility]ATH [facility] LOCATION;  Service: Cardiovascular    CARDIAC CATHETERIZATION N/A 02/11/2019    Procedure: Left ventriculography;  Surgeon: Cheryl Almeida MD;  Location:  [facility]VAS[facility] LOCATION;  Service: Cardiovascular    CARDIAC CATHETERIZATION  02/11/2019    Procedure: Saphenous Vein Graft;  Surgeon: Cheryl Almeida MD;  Location:  [facility] CATH INVASIVE LOCATION;  Service: Cardiovascular    CARDIAC CATHETERIZATION N/A 02/11/2019    Procedure: Native mammary injection;  Surgeon: Cheryl Almeida MD;  [facility];  Service: Cardiovascular    CARDIAC SURGERY      CHOLECYSTECTOMY      COLONOSCOPY N/A 06/08/2016    Procedure: COLONOSCOPY to cecum with cold biopsy polypectomies;  Surgeon: Alvardao Rivera MD;  Location: [facility] ENDOSCOPY;  Service:     CORONARY ARTERY BYPASS GRAFT  02/28/2016    CABG x4  Dr Monroy;  CHOI to LAD, SVG to obtuse marginal 1, SVG to obtuse marginal 2, and SVG to LV branch    ENDOSCOPY N/A 06/08/2016    Procedure: ESOPHAGOGASTRODUODENOSCOPY with biopsy;  Surgeon: Alvarado Rivera MD;  Location: [facility] ENDOSCOPY;  Service:     ENTEROSCOPY SMALL BOWEL N/A 08/02/2017    Procedure: ENTEROSCOPY SMALL BOWEL WITH COLD BIOPSIES AND ARGON PLASMA COAGULATION TO  GASTIC ULCERS;  Surgeon: Alvarado Rivera MD;  Location: [facility] ENDOSCOPY;  Service:     EYE SURGERY      2015    HERNIA REPAIR      JOINT REPLACEMENT      vijaya knees    OTHER SURGICAL HISTORY      KNEE REPLACEMENT    OTHER SURGICAL HISTORY      TREATMENT OF ANKLE FRACTURE    OTHER SURGICAL HISTORY      TREATMENT FRACTURE OF THE HAND       Social History     Socioeconomic History    Marital status:    Tobacco Use     "Smoking status: Never    Smokeless tobacco: Never   Vaping Use    Vaping Use: Never used   Substance and Sexual Activity    Alcohol use: Not Currently     Comment: WINE MAYBE EVERY COUPLE OF MONTHS    Drug use: Never    Sexual activity: Not Currently     Partners: Male     Birth control/protection: None       Family History   Problem Relation Age of Onset    Coronary artery disease Mother     Other Father     Coronary artery disease Father     Stroke Sister     Asthma Sister     Heart attack Sister     Heart attack Brother        The following portion of the patient's history were reviewed and updated as appropriate: past medical history, past surgical history, past social history, past family history, allergies, current medications, and problem list.    Review of Systems   Constitutional: Negative.   Cardiovascular:  Positive for dyspnea on exertion. Negative for leg swelling.   Respiratory:  Positive for shortness of breath. Negative for cough.    Hematologic/Lymphatic: Negative.    Neurological: Negative.        Allergies   Allergen Reactions    Codeine      \"makes me loopy\"    Iodinated Contrast Media      itching    Hctz [Hydrochlorothiazide] Nausea Only    Sulfa Antibiotics Unknown (See Comments)     NOT SURE         Current Outpatient Medications:     amLODIPine (NORVASC) 5 MG tablet, Take 1 tablet by mouth Daily., Disp: 90 tablet, Rfl: 2    aspirin 81 MG tablet, Take 1 tablet by mouth Daily., Disp: , Rfl:     atorvastatin (LIPITOR) 40 MG tablet, TAKE 1 TABLET DAILY, Disp: 90 tablet, Rfl: 3    carvedilol (COREG) 12.5 MG tablet, Take 1 tablet by mouth 2 (Two) Times a Day With Meals., Disp: 180 tablet, Rfl: 2    losartan (COZAAR) 100 MG tablet, TAKE 1 TABLET EVERY NIGHT, Disp: 90 tablet, Rfl: 3    NON FORMULARY, C PAP, Disp: , Rfl:          Objective:     Vitals:    10/12/23 1301   BP: 112/58   BP Location: Right arm   Patient Position: Sitting   Cuff Size: Adult   Pulse: 58   SpO2: 97%   Weight: 117 kg (257 lb " "9.6 oz)   Height: 154.9 cm (60.98\")     Body mass index is 48.7 kg/mý.    PHYSICAL EXAM:    Vitals Reviewed.   General Appearance: No acute distress, well developed and well nourished. Obese.   Eyes: Glasses.   HENT: No hearing loss noted.    Respiratory: No signs of respiratory distress. Respiration rhythm and depth normal.  Clear to auscultation.   Cardiovascular:  Jugular Venous Pressure: Normal  Heart Rate and Rhythm: Normal, Heart Sounds: Normal S1 and S2. No S3 or S4 noted.  Murmurs: No murmurs noted. No rubs, thrills, or gallops.   Lower Extremities: Bilateral lower extremity edema; left greater than right.  Musculoskeletal: Normal movement of extremities.  Skin: General appearance normal.    Psychiatric: Patient alert and oriented to person, place, and time. Speech and behavior appropriate. Normal mood and affect.       ECG 12 Lead    Date/Time: 10/12/2023 12:54 PM  Performed by: Anabel Wood APRN    Authorized by: Anabel Wood APRN  Comparison: compared with previous ECG from 9/10/2023  Similar to previous ECG  Rhythm: sinus bradycardia  Rate: bradycardic  BPM: 58  Conduction: conduction normal  ST Segments: ST segments normal  ST Depression: V1-V6  QRS axis: normal  Other findings: T wave abnormality and left ventricular hypertrophy    Clinical impression: non-specific ECG            Assessment:       Diagnosis Plan   1. Syncope and collapse        2. Acute UTI        3. Bradycardia        4. Coronary artery disease involving native coronary artery of native heart without angina pectoris        5. Essential hypertension               Plan:       1/2.  Syncope: Recently hospitalized after having a syncopal episode where she took a goal blood coke and then could not breathe.  She passed out.  Not felt to be due to a cardiac reason.  There was concerned about bradycardia, but her heart rates were stable.  She was also diagnosed with an acute UTI.    3.  Bradycardia: Heart rate stable on carvedilol " 12.5 mg twice per day.    4/5.  Coronary Artery Disease: Status post CABG in January 2016.  Heart cath showed 2 out of 4 patent bypass grafts in January 2019.  Dr. Almeida said if angina persisted that we could potentially intervene on the left main to left circumflex.  She has chronic T wave inversions that are unchanged.  Currently denies angina.  Continue aspirin and atorvastatin.    6.  Hypertension: Blood pressure well controlled with the addition of amlodipine.    7.  Hyperlipidemia: Remains on atorvastatin.    8.  Obstructive sleep apnea: Compliant with CPAP.    9.  Obesity. Body mass index is 48.7 kg/mý.     10.  She is stable from a cardiac standpoint.  She will call with any further concerns.  Keep scheduled follow-up appointment with Dr. Renee.    As always, it has been a pleasure to participate in your patient's care. Thank you.         Sincerely,         MANDIE Rodriguez  [facility]      Dictated utilizing Dragon Dictation

## 2024-01-04 ENCOUNTER — OFFICE VISIT (OUTPATIENT)
Dept: FAMILY MEDICINE CLINIC | Facility: CLINIC | Age: 79
End: 2024-01-04
Payer: MEDICARE

## 2024-01-04 VITALS
HEART RATE: 66 BPM | BODY MASS INDEX: 50.41 KG/M2 | WEIGHT: 267 LBS | DIASTOLIC BLOOD PRESSURE: 68 MMHG | OXYGEN SATURATION: 97 % | HEIGHT: 61 IN | TEMPERATURE: 97 F | SYSTOLIC BLOOD PRESSURE: 110 MMHG

## 2024-01-04 DIAGNOSIS — R55 SYNCOPE AND COLLAPSE: ICD-10-CM

## 2024-01-04 DIAGNOSIS — I10 ESSENTIAL HYPERTENSION: ICD-10-CM

## 2024-01-04 DIAGNOSIS — M79.601 RIGHT ARM PAIN: Primary | ICD-10-CM

## 2024-01-04 PROCEDURE — 3078F DIAST BP <80 MM HG: CPT

## 2024-01-04 PROCEDURE — 99213 OFFICE O/P EST LOW 20 MIN: CPT

## 2024-01-04 PROCEDURE — 3074F SYST BP LT 130 MM HG: CPT

## 2024-01-04 NOTE — PROGRESS NOTES
"Chief Complaint  Follow-up (Pt is here today to follow up was last seen 10/2023 pt presents right arm pain hurts worse when she moves pt confirms she has been having this since problem since her fall.)    Subjective        Julee King presents to [facility]  History of Present Illness  78 year old female presents for right arm pain that has been present since her fall. She will take tylenol as needed. Pain with ROM on exam. Denies recent fall since last visit. Due to persistent pain, xray of right shoulder and upper arm ordered. Pts hypertension well controlled today at 110/68. Continue current medication regimen.       Objective   Vital Signs:  /68   Pulse 66   Temp 97 °F (36.1 °C)   Ht 154.9 cm (60.98\")   Wt 121 kg (267 lb)   SpO2 97%   BMI 50.48 kg/m²   Estimated body mass index is 50.48 kg/m² as calculated from the following:    Height as of this encounter: 154.9 cm (60.98\").    Weight as of this encounter: 121 kg (267 lb).          Physical Exam  Constitutional:       Appearance: Normal appearance.   HENT:      Head: Normocephalic.   Eyes:      Conjunctiva/sclera: Conjunctivae normal.      Pupils: Pupils are equal, round, and reactive to light.   Cardiovascular:      Rate and Rhythm: Normal rate and regular rhythm.      Pulses: Normal pulses.      Heart sounds: Normal heart sounds.   Pulmonary:      Effort: Pulmonary effort is normal.      Breath sounds: Normal breath sounds.   Musculoskeletal:      Right shoulder: Decreased range of motion.      Left upper arm: Normal.      Comments: Limited ROM of right arm   Skin:     General: Skin is warm.   Neurological:      General: No focal deficit present.      Mental Status: She is alert and oriented to person, place, and time.   Psychiatric:         Mood and Affect: Mood normal.         Behavior: Behavior normal.            Result Review :  The following data was reviewed by: MANDIE Pemberton on 01/04/2024:  Common " [facility]    Medicine Progress Note - Hospitalist Service       Date of Admission:  6/5/2020  Assessment & Plan     Patient is an 86-year-old gentleman with a past medical history of recent hip surgery repair following fracture, chronic hyponatremia (normally mild in the upper 120 to low 130 range), and BPH with recent worsening retention despite Flomax initiation who [facility] and during which stay a Streeter catheter was inserted for retention, with trial of voiding failed even after initiation of Flomax and Streeter was reinserted on 6/3.  He presents with sudden onset of fever, shortness of breath and occasional cough.  In the emergency room he was found to be mildly hypoxemic and required O2 supplementation with white blood cell count minimally elevated at 11,600.  Sodium was low at 121.  Lactic acid was unremarkable.  UA was abnormal with large amounts of white blood cells present, concerning for catheter associated UTI.  Of note, the same concern had been occurring at the TCU environment but patient had not yet started the Bactrim that had been prescribed.  Decision was made to admit patient, placed on Rocephin for possible UTI, perform COVID testing and monitor patient closely.      Since admission his COVID testing has returned and is negative but patient is felt to be in not low suspicion and therefore has been continued on special precautions with plans to retest at 72 hours.  On evening of admission he had the start of a rash on his abdomen and flank which by the morning of 6/6/2020 was a diffuse papular rash noted throughout his body.  Rocephin has been discontinued and IV Levaquin started on 6/6/2020.  Procalcitonin was initially low risk for systemic infection but repeat has increased to 0.43 on redraw on 6/6/2020 - retesting from this morning is pending.  Patient continues to have intermittent fevers (101.2 overnight) -  blood cultures will be redrawn this morning.  Urine culture is now growing out Enterbacter cloacae with suspectibilities pending but on review of local antibiogram there is 100% coverage with Levaquin.  Remainder of cultures show no growth to date.      Principal Problem:    Febrile illness, acute    Assessment: Etiology unclear but increasing suspicion for catheter associated urinary tract infection but not yet started on treatment on an outpatient basis, COVID testing x1 negative and chest x-ray negative for any signs of pneumonia. Urine culture is growing enterobacter cloacae.  Patient continues to have fevers (101.2 overnight) with procalcitonin increasing in first 24 hours of hospitalization with redraw from this morning pending.  Patient initially placed on Rocephin but transitioned to IV Levaquin on 6/6/2020 after rash development.      Plan: Continue with IV Levaquin and monitor urine culture sensitivies.  Monitor for procalcitonin results and consider broadening of antibiotics if it is continuing to increase.  Repeat blood cultures x2 again today given ongoing fevers.  We will continue with special precautions and plan to recheck COVID testing at 72 hours unless patient significantly clinically improves and definitive etiology can be found.    Active Problems:    Urinary tract infection associated with indwelling urethral catheter (H)    Assessment: UA highly suspicious for urinary tract infection with large pyuria and bacteria present.  Urine culture growing enterobacter cloacae with sensitivities pending.  Currently on IV Levaquin with local antibiogram showing 100% coverage with previous local infections.      Plan: Continue with IV Levaquin and monitor for sensitivity results to ensure adequate choice.  Patient will need exchange of his Streeter catheter prior to discharge from the hospital      Suspected Covid-19 Virus Infection    Assessment: Fever, shortness of breath, cough in a patient without obvious  labs          9/10/2023    12:31 9/11/2023    04:51 9/12/2023    04:48   Common Labs   Glucose 119  118  144    BUN 13  11  15    Creatinine 0.59  0.59  0.64    Sodium 144  142  143    Potassium 3.7  3.7  3.5    Chloride 112  110  110    Calcium 8.9  8.7  8.3    Albumin 3.8      Total Bilirubin 0.8      Alkaline Phosphatase 125      AST (SGOT) 11      ALT (SGPT) 9      WBC 6.79  5.86  6.22    Hemoglobin 13.4  12.2  11.9    Hematocrit 41.4  37.4  37.3    Platelets 160  148  146    Hemoglobin A1C 6.10        Data reviewed : labs              Assessment and Plan   Diagnoses and all orders for this visit:    1. Right arm pain (Primary)  -     XR shoulder 2+ vw right; Future  -     XR humerus right; Future    2. Syncope and collapse    3. Essential hypertension  Assessment & Plan:  Hypertension is  stable .  Continue current treatment regimen.  Blood pressure will be reassessed at the next regular appointment.               Follow Up   No follow-ups on file.  Patient was given instructions and counseling regarding her condition or for health maintenance advice. Please see specific information pulled into the AVS if appropriate.          pneumonia.  Initial COVID testing has been negative    Plan: We will continue with special precautions and plan to retest on 6/8/2020, 72 hours after the initial test unless other definitive etiology can be identified prior      Hypoxemia    Assessment: Noted in the TCU environment and continuing on evaluation in the emergency room, associated with fever and cough as above.  COVID testing x1 has been negative but patient continues on special precautions.  Chest x-ray shows no pneumonia.  Patient has been weaned off O2 supplementation over the past 24 hours    Plan: Continue to monitor for ongoing resolution, continue special precautions and plan for repeat COVID testing at 72 hours as clinically appropriate.      Hyponatremia    Assessment: Present at baseline with sodiums normally in the upper 120 to low 130 range.  Sodium was 121 on presentation to the emergency room.  Had been receiving a diuretic recently which could contribute.  Patient has been getting IV fluid resuscitation and has had slow increase of his sodium to the 127 range this morning    Plan:  decrease IV fluids to 75 cc an hour, continue to hold diuretic, and monitor sodium again every 6 hours during the day to ensure ongoing stabilization.  Patient reports he has not liked fluid restrictions or sodium tablets that have been given to him in the past and normally feels fine when his sodium is in the upper 120 range.      Rash    Assessment: Developing since admission and first noted by the admission nurse on arrival to the hospital floor but had not been noted by either provider or any nurses in the emergency room prior to transfer.  Initially was only involving the abdomen and the lower back region but progressed to encompass the majority of his body.  No itching or hives.  Rash is papular in nature but no pustular lesions are present.  Although the timing would be atypical for a drug reaction rash to Rocephin, as this is the patient's only new  medication it was discontinue Rocephin and patient transition to IV Levaquin for ongoing treatment of suspected UTI as above.  Given febrile illness of unclear etiology, may also be a viral exanthem.  Has now appeared to stabilize.      Plan:   Continue to monitor closely for ongoing stability.      BPH with urinary retention    Assessment: BPH present at baseline with patient having increasing issues with retention in recent weeks despite Flomax being initiated.  Patient now has Streeter catheter in place with plans for outpatient urology follow-up    Plan: Continue with Streeter catheter for now.  Once organism is known and appropriate antibiotic has been given for over 24 hours, will need to replace Streeter catheter prior to discharge back to TCU.      Tobacco use disorder    Assessment: Chronic and stable, on nicotine replacement    Plan: Continue with nicotine patch during this hospital stay and monitor      History of repair of hip fracture    Assessment: Patient status post hip fracture repair in May 2020 at this facility, has been recovering well and currently is at a TCU environment for ongoing strengthening    Plan: Continue supportive care.  As patient is feeling clinically better, will reinitiate PT and OT during this stay and plan for discharge back to TCU when patient is medically stable for discharge.1       Diet: Combination Diet Regular Diet Adult    DVT Prophylaxis: Pneumatic Compression Devices  Streeter Catheter: in place, indication: Other (Comment)(Chronic)  Code Status: Full Code           Disposition Plan   Expected discharge: 2 - 3 days, recommended to transitional care unit once adequate pain management/ tolerating PO medications, antibiotic plan established and SIRS/Sepsis treated.  Entered: Amara Doss MD 06/07/2020, 7:12 AM       The patient's care was discussed with the Bedside Nurse, Care Coordinator/ and Patient.  Attempted to call family and message was  left.    Amara Doss MD  Hospitalist Service  [facility]    ______________________________________________________________________    Interval History   Patient had another fever of 101.2 overnight but afebrile this morning.  WBC and procalcitonin decreasing.  Patient feels his energy and appetite has improved, wanting to know when he can started getting back to work on getting stronger.  He denies any new symptoms.  No new nursing concerns.     Data reviewed today: I reviewed all medications, new labs and imaging results over the last 24 hours.    Physical Exam   Vital Signs: Temp: 99.8  F (37.7  C) Temp src: Oral BP: (!) 143/66 Pulse: 62 Heart Rate: 69 Resp: 18 SpO2: 94 % O2 Device: None (Room air)    Weight: 184 lbs 8.4 oz  Constitutional: awake, alert, cooperative, no apparent distress, and appears stated age  Respiratory: No increased work of breathing, good air exchange, clear to auscultation bilaterally, no crackles or wheezing  Cardiovascular: regular rate and rhythm  GI: bowel sounds present, abdomen soft and non-tender   Skin: ongoing papular rash present throughout his body (from face to feet) which is slightly darker red in color than noted yesterday but no significant worsening, no hives, no pustules   Musculoskeletal: no lower extremity pitting edema present  Neurologic: Awake, alert, oriented to name, place and year and overall to situation.  Continues to be very tangential in his conversation and is difficult to re-direct at times back to conversation at hand    Data   Recent Labs   Lab  06/07/20  0551 06/06/20  1823  06/06/20  0541 06/05/20  1838   WBC  4.8  --   --  7.9 11.6*   HGB  10.8*  --   --  12.6* 12.8*   MCV   --   --   --  89 86   PLT   --   --   --  258 309   NA  127* 127*   < > 124* 121*   POTASSIUM  3.4  --   --  3.8 4.0   CHLORIDE  98  --   --  93* 87*   CO2  22  --   --  24 25   BUN  17  --   --  21 20   CR  1.07  --   --  1.18 1.24    ANIONGAP  7  --   --  7 9   LAKISHA  7.0*  --   --  7.9* 7.7*   GLC  80  --   --  90 124*   ALBUMIN   --   --   --   --  2.6*   PROTTOTAL   --   --   --   --  7.0   BILITOTAL   --   --   --   --  0.9   ALKPHOS   --   --   --   --  112   ALT   --   --   --   --  24   AST   --   --   --   --  23    < > = values in this interval not displayed.

## 2024-01-08 ENCOUNTER — HOSPITAL ENCOUNTER (OUTPATIENT)
Dept: GENERAL RADIOLOGY | Facility: HOSPITAL | Age: 79
Discharge: HOME | End: 2024-01-08
Payer: MEDICARE

## 2024-01-08 DIAGNOSIS — M79.601 RIGHT ARM PAIN: ICD-10-CM

## 2024-01-08 PROCEDURE — 73030 X-RAY EXAM OF SHOULDER: CPT

## 2024-01-08 PROCEDURE — 73060 X-RAY EXAM OF HUMERUS: CPT

## 2024-01-16 DIAGNOSIS — M79.601 RIGHT ARM PAIN: ICD-10-CM

## 2024-01-16 DIAGNOSIS — M19.019 SHOULDER ARTHRITIS: Primary | ICD-10-CM

## 2024-01-17 RX ORDER — LOSARTAN POTASSIUM 100 MG/1
100 TABLET ORAL NIGHTLY
Qty: 90 TABLET | Refills: 3 | Status: SHIPPED | OUTPATIENT
Start: 2024-01-17

## 2024-05-14 RX ORDER — AMLODIPINE BESYLATE 5 MG/1
5 TABLET ORAL DAILY
Qty: 90 TABLET | Refills: 3 | Status: SHIPPED | OUTPATIENT
Start: 2024-05-14

## 2024-07-01 RX ORDER — CARVEDILOL 12.5 MG/1
12.5 TABLET ORAL 2 TIMES DAILY WITH MEALS
Qty: 180 TABLET | Refills: 2 | Status: SHIPPED | OUTPATIENT
Start: 2024-07-01

## 2024-07-18 RX ORDER — ATORVASTATIN CALCIUM 40 MG/1
40 TABLET, FILM COATED ORAL DAILY
Qty: 90 TABLET | Refills: 3 | Status: SHIPPED | OUTPATIENT
Start: 2024-07-18

## 2024-09-10 ENCOUNTER — OFFICE VISIT (OUTPATIENT)
Dept: CARDIOLOGY | Facility: CLINIC | Age: 79
End: 2024-09-10
Payer: MEDICARE

## 2024-09-10 VITALS
WEIGHT: 250 LBS | SYSTOLIC BLOOD PRESSURE: 130 MMHG | OXYGEN SATURATION: 97 % | BODY MASS INDEX: 47.2 KG/M2 | HEART RATE: 68 BPM | DIASTOLIC BLOOD PRESSURE: 70 MMHG | HEIGHT: 61 IN

## 2024-09-10 DIAGNOSIS — I10 ESSENTIAL HYPERTENSION: ICD-10-CM

## 2024-09-10 DIAGNOSIS — E78.5 DYSLIPIDEMIA: ICD-10-CM

## 2024-09-10 DIAGNOSIS — I25.10 CORONARY ARTERY DISEASE INVOLVING NATIVE CORONARY ARTERY OF NATIVE HEART WITHOUT ANGINA PECTORIS: Primary | ICD-10-CM

## 2024-09-10 DIAGNOSIS — G47.33 OBSTRUCTIVE SLEEP APNEA: ICD-10-CM

## 2024-09-10 DIAGNOSIS — E66.01 CLASS 3 SEVERE OBESITY DUE TO EXCESS CALORIES WITH SERIOUS COMORBIDITY AND BODY MASS INDEX (BMI) OF 45.0 TO 49.9 IN ADULT: ICD-10-CM

## 2024-09-10 PROCEDURE — 3078F DIAST BP <80 MM HG: CPT | Performed by: INTERNAL MEDICINE

## 2024-09-10 PROCEDURE — 3075F SYST BP GE 130 - 139MM HG: CPT | Performed by: INTERNAL MEDICINE

## 2024-09-10 PROCEDURE — 99214 OFFICE O/P EST MOD 30 MIN: CPT | Performed by: INTERNAL MEDICINE

## 2024-09-10 PROCEDURE — 1159F MED LIST DOCD IN RCRD: CPT | Performed by: INTERNAL MEDICINE

## 2024-09-10 PROCEDURE — 1160F RVW MEDS BY RX/DR IN RCRD: CPT | Performed by: INTERNAL MEDICINE

## 2024-09-10 PROCEDURE — 93000 ELECTROCARDIOGRAM COMPLETE: CPT | Performed by: INTERNAL MEDICINE

## 2024-09-10 NOTE — PROGRESS NOTES
Date of Office Visit: 09/10/2024  Encounter Provider: Anastasiia Callahan MD  Place of Service: [facility] CARDIOLOGY  Patient Name: Julee King  :1945      Patient ID:  Julee King is a 79 y.o. female is here for  followup for CAD        History of Present Illness    She is followed for CAD, s/p CABG. she has a history of hypertension, hyperlipidemia, morbid obesity, LOVE on CPAP (Sayied).    She presented to the hospital 2016 with chest pain and pressure and was severely anemic-found to have gastrointestinal bleeding, close transfused. Her troponin was elevated at 4. Her echocardiogram showed left ventricular ejection fraction of 33% with mild to moderate mitral insufficiency. She went on to have a cardiac catheterization done which showed 80% distal left main stenosis, 60% distal first obtuse marginal stenosis, 30% ramus mid vessel stenosis, 30% proximal LAD stenosis, 100% occluded first diagonal branch, 70% mid LAD stenosis, 100% proximal RCA stenosis and 95% distal RCA stenosis. She went on to have coronary bypass grafting 2016. She received LIMA to LAD, SVG to obtuse marginal 1, SVG to obtuse marginal 2 and SVG to LV branch. She also had endoscopy Dr. Senior done 2016 showing small hiatal hernia, normal stomach and duodenal.      She dyspnea on exertion 2019 and reported worsening dyspnea.  Her d-dimer was mildly elevated.  She had a VQ scan completed (unable to do CTA of chest because of allergy to iodine) which was negative for pulmonary emboli.  She had a 2D echocardiogram completed 19 which revealed an EF of 58%, mild LVH, and trace valvular regurgitation.  Cardiac PET scan 19 was abnormal revealing a medium size, mildly severe area of ischemia in the anterior and inferior lateral wall.       Catheterization 2019 showed 80% distal left main, 50% proximal LAD with 100% mid LAD stenosis, 30% proximal circumflex with 50% mid  circumflex, patent high large first obtuse marginal branch, 100% occluded RCA, patent LIMA to LAD with patent distal LAD, patent SVG to OM 1, occluded SVG to OM 2 and occluded SVG to RCA.  Dr. Almeida recommended medical management and if this fails to consider PCI of the distal left main to help with perfusion of the distal left circumflex system.     Stress nuclear perfusion study on 2022 showed a small to moderate sized area of mild ischemia of the inferior wall.  Echo done 2022 showed moderate concentric left ventricular hypertrophy with grade 1 diastolic dysfunction, ejection fraction 60% and RVSP 22 mmHg.     She was the caregiver for her  who  2023.  She now lives alone.  She stays active by doing quilting and activities with her friends.    She was admitted 9/10 - 2023 with syncope.  She was bradycardic on arrival but that quickly resolved and it was felt that she did not need a pacemaker but her carvedilol was decreased.  She had a coughing spell prior to her syncope so her syncope may have been related to that.  In addition, she did have urinary tract infection.    2 weeks Zio patch monitor done 2023 for syncope showed occasional PACs, short burst of atrial tachycardia, rare PVCs, no significant pauses, no bradycardia, average heart rate 70 bpm.  Echocardiogram done 2023 showed ejection fraction 56-60% with grade 1 diastolic dysfunction, mild right ventricular dilation with normal function, mild to moderate left atrial dilation, normal RVSP and no significant valvular abnormalities.  Carotid duplex study done 2023 showed no evidence of stenosis.  She was dismissed with follow-up for office.  She saw Anabel on 10/12/2023 and she was doing well.  No other testing was ordered and no changes were made to meds.    She has no chest pain or pressure.  She has had no further syncope.  She does not feel her heart racing or skipping.  She takes her medications as directed  without difficulty.    Past Medical History:   Diagnosis Date    Bronchitis 05/2016    Cardiomyopathy     Carotid artery disease     16-49% stenosis of the left internal carotid 2016; carotid duplex 1/2017 - normal    Cataract 2015    Cholelithiasis 2015    Colon polyps     Coronary artery disease 01/2016    Non-STEMI and status post CABG    Diverticulosis     Dyslipidemia     GI bleed     Severely anemic and received a blood transfusion    Grade I diastolic dysfunction 02/08/2022    Per echocardiogram 2/2022    Headache     Hiatal hernia     Hyperlipidemia     Hypertension     Hypocalcemia     Hypokalemia     LVH (left ventricular hypertrophy) 02/08/2022    Mild to moderate per echo 2/2022    Non-STEMI (non-ST elevated myocardial infarction) 01/2016    Obesity     LOVE (obstructive sleep apnea)     moderate to severe; compliant with CPAP machine    PVC (premature ventricular contraction)          Past Surgical History:   Procedure Laterality Date    CARDIAC CATHETERIZATION N/A 02/11/2019    Procedure: Coronary angiography;  Surgeon: Cheryl Almeida MD;  Location:  [facility] CATH INVASIVE LOC[facility];  Service: Cardiovascular    CARDIAC CATHETERIZATION N/A 02/11/2019    Procedure: Left Heart Cath;  Surgeon: Cheryl Almeida MD;  Location:  [facility];  Service: Cardiovascular    CARDIAC CATHETERIZATION N/A 02/11/2019    Procedure: Left ventriculography;  Surgeon: Cheryl Almeida MD;  Location:  [facility];  Service: Cardiovascular    CARDIAC CATHETERIZATION  02/11/2019    Procedure: Saphenous Vein Graft;  Surgeon: Cheryl Almeida MD;  Location:  [facility] C[facility] INVAS[facility];  Service: Cardiovascular    CARDIAC CATHETERIZATION N/A 02/11/2019    Procedure: Native mammary injection;  Surgeon: Cheryl Almeida MD;  Location:  [facility] C[facility] INVASIVE [facility];  Service: Cardiovascular    CARDIAC SURGERY      CHOLECYSTECTOMY      COLONOSCOPY N/A 06/08/2016    Procedure: COLONOSCOPY to cecum with  cold biopsy polypectomies;  Surgeon: Alvarado Rivera MD;  Location:  [facility];  Service:     CORONARY ARTERY BYPASS GRAFT  02/28/2016    CABG x4  Dr Monroy;  CHOI to LAD, SVG to obtuse marginal 1, SVG to obtuse marginal 2, and SVG to LV branch    ENDOSCOPY N/A 06/08/2016    Procedure: ESOPHAGOGASTRODUODENOSCOPY with biopsy;  Surgeon: Alvarado Rivera MD;  Location:  [facility];  Service:     ENTEROSCOPY SMALL BOWEL N/A 08/02/2017    Procedure: ENTEROSCOPY SMALL BOWEL WITH COLD BIOPSIES AND ARGON PLASMA COAGULATION TO  GASTIC ULCERS;  Surgeon: Alvarado Rivera MD;  Location:  [facility] ENDOS[facility];  Service:     EYE SURGERY      2015    HERNIA REPAIR      JOINT REPLACEMENT      vijaya knees    OTHER SURGICAL HISTORY      KNEE REPLACEMENT    OTHER SURGICAL HISTORY      TREATMENT OF ANKLE FRACTURE    OTHER SURGICAL HISTORY      TREATMENT FRACTURE OF THE HAND       Current Outpatient Medications on File Prior to Visit   Medication Sig Dispense Refill    amLODIPine (NORVASC) 5 MG tablet TAKE 1 TABLET DAILY 90 tablet 3    aspirin 81 MG tablet Take 1 tablet by mouth Daily.      atorvastatin (LIPITOR) 40 MG tablet Take 1 tablet by mouth Daily. 90 tablet 3    carvedilol (COREG) 12.5 MG tablet Take 1 tablet by mouth 2 (Two) Times a Day With Meals. 180 tablet 2    losartan (COZAAR) 100 MG tablet Take 1 tablet by mouth Every Night. 90 tablet 3    NON FORMULARY C PAP       No current facility-administered medications on file prior to visit.       Social History     Socioeconomic History    Marital status:    Tobacco Use    Smoking status: Never    Smokeless tobacco: Never   Vaping Use    Vaping status: Never Used   Substance and Sexual Activity    Alcohol use: Not Currently     Comment: WINE MAYBE EVERY COUPLE OF MONTHS    Drug use: Never    Sexual activity: Not Currently     Partners: Male     Birth control/protection: None             Procedures    ECG 12 Lead    Date/Time: 9/10/2024 12:02 PM  Performed by: Sindy  "Anastasiia MATOS MD    Authorized by: Anastasiia Callahan MD  Comparison: compared with previous ECG   Similar to previous ECG  Rhythm: sinus rhythm  T inversion: V2    Clinical impression: abnormal EKG              Objective:      Vitals:    09/10/24 1130   BP: 130/70   Pulse: 68   SpO2: 97%   Weight: 113 kg (250 lb)   Height: 154.9 cm (60.98\")     Body mass index is 47.27 kg/m².    Vitals reviewed.   Constitutional:       General: Not in acute distress.     Appearance: Morbidly obese. Not diaphoretic.   Neck:      Vascular: No carotid bruit or JVD.   Pulmonary:      Effort: Pulmonary effort is normal.      Breath sounds: Normal breath sounds.   Cardiovascular:      Normal rate. Regular rhythm.      Murmurs: There is no murmur.      No gallop.  No rub.   Pulses:     Intact distal pulses.      Carotid: 2+ bilaterally.     Radial: 2+ bilaterally.     Dorsalis pedis: 2+ bilaterally.     Posterior tibial: 2+ bilaterally.  Edema:     Peripheral edema present.     Ankle: 2+ edema of the left ankle and 1+ edema of the right ankle.  Neurological:      Cranial Nerves: No cranial nerve deficit.         Lab Review:       Assessment:      Diagnosis Plan   1. Coronary artery disease involving native coronary artery of native heart without angina pectoris        2. Essential hypertension        3. Dyslipidemia        4. Class 3 severe obesity due to excess calories with serious comorbidity and body mass index (BMI) of 45.0 to 49.9 in adult        5. Obstructive sleep apnea               CAD, s/p CABG 1/2016, has occluded SVG to RCA and occluded SVG to OM1 to, treated medically.  Stress nuclear perfusion study done 2/2022 showed inferior wall ischemia, she had no unstable symptoms and has been treated medically.  If she develops anginal type chest pain, she could theoretically have a left main intervention to improve perfusion to the left circumflex system.  History of GI bleeding 1/2016  Hypertension, " well-controlled  Hyperlipidemia, on atorvastatin  LOVE on CPAP  Obesity.    Plan:       See Nelli in 1 year, no medication changes, no other testing at this time, overall doing well.

## 2024-09-16 ENCOUNTER — OFFICE VISIT (OUTPATIENT)
Dept: FAMILY MEDICINE CLINIC | Facility: CLINIC | Age: 79
End: 2024-09-16
Payer: MEDICARE

## 2024-09-16 VITALS
BODY MASS INDEX: 46.44 KG/M2 | WEIGHT: 246 LBS | SYSTOLIC BLOOD PRESSURE: 130 MMHG | DIASTOLIC BLOOD PRESSURE: 70 MMHG | HEART RATE: 81 BPM | TEMPERATURE: 97.8 F | OXYGEN SATURATION: 95 % | HEIGHT: 61 IN

## 2024-09-16 DIAGNOSIS — R11.2 NAUSEA AND VOMITING, UNSPECIFIED VOMITING TYPE: ICD-10-CM

## 2024-09-16 DIAGNOSIS — I10 ESSENTIAL HYPERTENSION: ICD-10-CM

## 2024-09-16 DIAGNOSIS — A08.4 VIRAL GASTROENTERITIS: Primary | ICD-10-CM

## 2024-09-16 DIAGNOSIS — R10.84 GENERALIZED ABDOMINAL PAIN: ICD-10-CM

## 2024-09-16 PROCEDURE — 3075F SYST BP GE 130 - 139MM HG: CPT

## 2024-09-16 PROCEDURE — 1125F AMNT PAIN NOTED PAIN PRSNT: CPT

## 2024-09-16 PROCEDURE — 1159F MED LIST DOCD IN RCRD: CPT

## 2024-09-16 PROCEDURE — 3078F DIAST BP <80 MM HG: CPT

## 2024-09-16 PROCEDURE — 1160F RVW MEDS BY RX/DR IN RCRD: CPT

## 2024-09-16 PROCEDURE — 99213 OFFICE O/P EST LOW 20 MIN: CPT

## 2024-09-16 RX ORDER — PROCHLORPERAZINE MALEATE 5 MG
5 TABLET ORAL EVERY 6 HOURS PRN
Qty: 10 TABLET | Refills: 0 | Status: SHIPPED | OUTPATIENT
Start: 2024-09-16

## 2024-09-17 DIAGNOSIS — R11.2 NAUSEA AND VOMITING, UNSPECIFIED VOMITING TYPE: ICD-10-CM

## 2024-09-17 DIAGNOSIS — A08.4 VIRAL GASTROENTERITIS: ICD-10-CM

## 2024-09-17 DIAGNOSIS — R10.84 GENERALIZED ABDOMINAL PAIN: ICD-10-CM

## 2024-09-17 RX ORDER — NUTRITIONAL SUPPLEMENT
1 BAR ORAL AS NEEDED
Qty: 2 EACH | Refills: 0 | COMMUNITY
Start: 2024-09-17

## 2024-09-18 LAB
ALBUMIN SERPL-MCNC: 3.5 G/DL (ref 3.5–5.2)
ALBUMIN/GLOB SERPL: 1.5 G/DL
ALP SERPL-CCNC: 117 U/L (ref 39–117)
ALT SERPL-CCNC: 14 U/L (ref 1–33)
AST SERPL-CCNC: 22 U/L (ref 1–32)
BASOPHILS # BLD AUTO: 0.02 10*3/MM3 (ref 0–0.2)
BASOPHILS NFR BLD AUTO: 0.3 % (ref 0–1.5)
BILIRUB SERPL-MCNC: 1 MG/DL (ref 0–1.2)
BUN SERPL-MCNC: 37 MG/DL (ref 8–23)
BUN/CREAT SERPL: 29.6 (ref 7–25)
CALCIUM SERPL-MCNC: 7.9 MG/DL (ref 8.6–10.5)
CHLORIDE SERPL-SCNC: 106 MMOL/L (ref 98–107)
CO2 SERPL-SCNC: 21 MMOL/L (ref 22–29)
CREAT SERPL-MCNC: 1.25 MG/DL (ref 0.57–1)
EGFRCR SERPLBLD CKD-EPI 2021: 44.2 ML/MIN/1.73
EOSINOPHIL # BLD AUTO: 0.23 10*3/MM3 (ref 0–0.4)
EOSINOPHIL NFR BLD AUTO: 2.9 % (ref 0.3–6.2)
ERYTHROCYTE [DISTWIDTH] IN BLOOD BY AUTOMATED COUNT: 13.6 % (ref 12.3–15.4)
GLOBULIN SER CALC-MCNC: 2.4 GM/DL
GLUCOSE SERPL-MCNC: 149 MG/DL (ref 65–99)
HCT VFR BLD AUTO: 40.6 % (ref 34–46.6)
HGB BLD-MCNC: 13.1 G/DL (ref 12–15.9)
IMM GRANULOCYTES # BLD AUTO: 0.02 10*3/MM3 (ref 0–0.05)
IMM GRANULOCYTES NFR BLD AUTO: 0.3 % (ref 0–0.5)
LYMPHOCYTES # BLD AUTO: 1 10*3/MM3 (ref 0.7–3.1)
LYMPHOCYTES NFR BLD AUTO: 12.7 % (ref 19.6–45.3)
MCH RBC QN AUTO: 26.7 PG (ref 26.6–33)
MCHC RBC AUTO-ENTMCNC: 32.3 G/DL (ref 31.5–35.7)
MCV RBC AUTO: 82.7 FL (ref 79–97)
MONOCYTES # BLD AUTO: 1.26 10*3/MM3 (ref 0.1–0.9)
MONOCYTES NFR BLD AUTO: 16 % (ref 5–12)
NEUTROPHILS # BLD AUTO: 5.36 10*3/MM3 (ref 1.7–7)
NEUTROPHILS NFR BLD AUTO: 67.8 % (ref 42.7–76)
NRBC BLD AUTO-RTO: 0 /100 WBC (ref 0–0.2)
PLATELET # BLD AUTO: 169 10*3/MM3 (ref 140–450)
POTASSIUM SERPL-SCNC: 3.5 MMOL/L (ref 3.5–5.2)
PROT SERPL-MCNC: 5.9 G/DL (ref 6–8.5)
RBC # BLD AUTO: 4.91 10*6/MM3 (ref 3.77–5.28)
SODIUM SERPL-SCNC: 138 MMOL/L (ref 136–145)
WBC # BLD AUTO: 7.89 10*3/MM3 (ref 3.4–10.8)

## 2024-09-19 DIAGNOSIS — N28.9 ABNORMAL KIDNEY FUNCTION: Primary | ICD-10-CM

## 2024-09-25 ENCOUNTER — TELEPHONE (OUTPATIENT)
Dept: FAMILY MEDICINE CLINIC | Facility: CLINIC | Age: 79
End: 2024-09-25
Payer: MEDICARE

## 2024-11-04 ENCOUNTER — OFFICE VISIT (OUTPATIENT)
Dept: SLEEP MEDICINE | Facility: HOSPITAL | Age: 79
End: 2024-11-04
Payer: MEDICARE

## 2024-11-04 VITALS
HEIGHT: 61 IN | WEIGHT: 256 LBS | DIASTOLIC BLOOD PRESSURE: 64 MMHG | OXYGEN SATURATION: 94 % | HEART RATE: 73 BPM | SYSTOLIC BLOOD PRESSURE: 127 MMHG | BODY MASS INDEX: 48.33 KG/M2

## 2024-11-04 DIAGNOSIS — G47.33 OSA (OBSTRUCTIVE SLEEP APNEA): Primary | ICD-10-CM

## 2024-11-04 PROCEDURE — G0463 HOSPITAL OUTPT CLINIC VISIT: HCPCS

## 2024-11-04 NOTE — PROGRESS NOTES
CONSULT NOTE    Patient Identification:  Julee King  79 y.o.  female  1945  [id number]            Requesting physician: MANDIE Pemberton    Reason for Consultation: LOVE reestablish care    CC:     History of Present Illness:  Very pleasant 79-year-old female she was last seen at the sleep lab in 2016.  She actually had a sleep study done prior to that and study was consistent with mild LOVE with AHI 5.2 events per hour.  She is currently on CPAP 10 cm.  Her compliance today is 90% with average daily use 5 hours 29 minutes.  AHI leak within normal limits.  Her current CPAP is more than 8 years old and she request a new CPAP.  She feels rested with her CPAP.  No alcohol abuse no parasomnia such as sleepwalking sleep talking or restless leg symptoms reported.  Goes to bed 12 gets up 10 gets about 7+ hours of sleep and more rested with CPAP.  She has had some weight gain in the last 8 years.      Review of Systems  12 point review of system positive for swollen ankles rest is negative.  [place] 6 out of 24 within normal limits  Past Medical History:  Past Medical History:   Diagnosis Date    Bronchitis 05/2016    Cardiomyopathy     Carotid artery disease     16-49% stenosis of the left internal carotid 2016; carotid duplex 1/2017 - normal    Cataract 2015    Cholelithiasis 2015    Colon polyps     Coronary artery disease 01/2016    Non-STEMI and status post CABG    Diverticulosis     Dyslipidemia     GI bleed     Severely anemic and received a blood transfusion    Grade I diastolic dysfunction 02/08/2022    Per echocardiogram 2/2022    Headache     Hiatal hernia     Hyperlipidemia     Hypertension     Hypocalcemia     Hypokalemia     LVH (left ventricular hypertrophy) 02/08/2022    Mild to moderate per echo 2/2022    Non-STEMI (non-ST elevated myocardial infarction) 01/2016    Obesity     LOVE (obstructive sleep apnea)     moderate to severe; compliant with CPAP machine    PVC (premature ventricular  contraction)        Past Surgical History:  Past Surgical History:   Procedure Laterality Date    CARDIAC CATHETERIZATION N/A 02/11/2019    Procedure: Coronary angiography;  Surgeon: Cheryl Almeida MD;  Location:  [facility];  Service: Cardiovascular    CARDIAC CATHETERIZATION N/A 02/11/2019    Procedure: Left Heart Cath;  Surgeon: Cheryl Almeida MD;  Location:  [facility];  Service: Cardiovascular    CARDIAC CATHETERIZATION N/A 02/11/2019    Procedure: Left ventriculography;  Surgeon: Cheryl Almeida MD;  Location:  [facility];  Service: Cardiovascular    CARDIAC CATHETERIZATION  02/11/2019    Procedure: Saphenous Vein Graft;  Surgeon: Cheryl Almeida MD;  Location:  [facility];  Service: Cardiovascular    CARDIAC CATHETERIZATION N/A 02/11/2019    Procedure: Native mammary injection;  Surgeon: Cheryl Almeida MD;  Location: [facility] CATH INVAS[facility];  Service: Cardiovascular    CARDIAC SURGERY      CHOLECYSTECTOMY      COLONOSCOPY N/A 06/08/2016    Procedure: COLONOSCOPY to cecum with cold biopsy polypectomies;  Surgeon: Alvarado Rivera MD;  Location: [facility] ENDOSCOPY;  Service:     CORONARY ARTERY BYPASS GRAFT  02/28/2016    CABG x4  Dr Monroy;  CHOI to LAD, SVG to obtuse marginal 1, SVG to obtuse marginal 2, and SVG to LV branch    ENDOSCOPY N/A 06/08/2016    Procedure: ESOPHAGOGASTRODUODENOSCOPY with biopsy;  Surgeon: Alvarado Rivera MD;  Location: [facility] ENDOSCOPY;  Service:     ENTEROSCOPY SMALL BOWEL N/A 08/02/2017    Procedure: ENTEROSCOPY SMALL BOWEL WITH COLD BIOPSIES AND ARGON PLASMA COAGULATION TO  GASTIC ULCERS;  Surgeon: Alvarado Rivera MD;  Location: [facility] ENDOSCOPY;  Service:     EYE SURGERY      2015    HERNIA REPAIR      JOINT REPLACEMENT      vijaya knees    OTHER SURGICAL HISTORY      KNEE REPLACEMENT    OTHER SURGICAL HISTORY      TREATMENT OF ANKLE FRACTURE    OTHER SURGICAL HISTORY      TREATMENT FRACTURE OF THE  "HAND        Home Meds:  (Not in a hospital admission)      Allergies:  Allergies   Allergen Reactions    Codeine      \"makes me loopy\"    Iodinated Contrast Media      itching    Hctz [Hydrochlorothiazide] Nausea Only    Sulfa Antibiotics Unknown (See Comments)     NOT SURE       Social History:   Social History     Socioeconomic History    Marital status:    Tobacco Use    Smoking status: Never    Smokeless tobacco: Never   Vaping Use    Vaping status: Never Used   Substance and Sexual Activity    Alcohol use: Not Currently     Comment: WINE MAYBE EVERY COUPLE OF MONTHS    Drug use: Never    Sexual activity: Not Currently     Partners: Male     Birth control/protection: None       Family History:  Family History   Problem Relation Age of Onset    Coronary artery disease Mother     Other Father     Coronary artery disease Father     Stroke Sister     Asthma Sister     Heart attack Sister     Heart attack Brother        Physical Exam:  /64   Pulse 73   Ht 154.9 cm (61\")   Wt 116 kg (256 lb)   SpO2 94%   BMI 48.37 kg/m²  Body mass index is 48.37 kg/m². 94% 116 kg (256 lb)  Physical Exam  Patient is examined using the personal protective equipment as per guidelines from infection control for this particular patient as enacted.  Hand hygiene was performed before and after patient interaction.  Well-developed normal body habitus  Eyes normal conjunctivae and pupils reactive to light  ENT Mallampati between 3 and 4 normal nasal exam  Neck midline trachea no thyromegaly  Chest no labored breathing clear  CVS regular rate and rhythm no lower extremity edema  Abdomen soft nontender no hepatosplenomegaly  CNS intact normal sensory exam  Skin no rashes no nodules  Psych oriented to time place and person normal memory  Musculoskeletal no cyanosis no clubbing normal range of motion        LABS:  Lab Results   Component Value Date    CALCIUM 8.3 (L) 09/25/2024    PHOS 3.4 02/01/2016       Lab Results   Component " Value Date    TROPONINT 9 09/10/2023                                 Lab Results   Component Value Date    TSH 1.650 12/09/2020     CrCl cannot be calculated (Patient's most recent lab result is older than the maximum 30 days allowed.).         Imaging: I personally visualized the images of scans/x-rays performed within last 3 days.      Assessment:  LOVE on CPAP  Obesity  Hypertension      Recommendations:  At this time we have a female with known history of mild LOVE needs to reestablish care.  Reviewed old sleep study result and records  Patient requires new CPAP would recommend set up with auto CPAP 5 to 15 cm with heated timidity and ramp  Patient agreeable to be set up with auto CPAP  Reeducated patient on LOVE and sleep hygiene measures  Treatment of underlying comorbidities  Weight loss encouraged  Sleep hygiene measures discussed  We will follow-up in 8 to 10 weeks after set up with new CPAP              Bianka Galaviz MD  11/4/2024  13:22 EST      Much of this encounter note is an electronic transcription/translation of spoken language to printed text using Dragon Software.

## 2024-11-07 ENCOUNTER — TELEPHONE (OUTPATIENT)
Dept: SLEEP MEDICINE | Facility: HOSPITAL | Age: 79
End: 2024-11-07
Payer: MEDICARE

## 2025-01-27 ENCOUNTER — OFFICE VISIT (OUTPATIENT)
Dept: SLEEP MEDICINE | Facility: HOSPITAL | Age: 80
End: 2025-01-27
Payer: MEDICARE

## 2025-01-27 VITALS
OXYGEN SATURATION: 92 % | DIASTOLIC BLOOD PRESSURE: 62 MMHG | HEIGHT: 61 IN | BODY MASS INDEX: 49.09 KG/M2 | HEART RATE: 76 BPM | SYSTOLIC BLOOD PRESSURE: 139 MMHG | WEIGHT: 260 LBS

## 2025-01-27 DIAGNOSIS — G47.33 OSA (OBSTRUCTIVE SLEEP APNEA): Primary | ICD-10-CM

## 2025-01-27 PROCEDURE — G0463 HOSPITAL OUTPT CLINIC VISIT: HCPCS

## 2025-01-27 NOTE — PROGRESS NOTES
"[facility] PULMONARY CARE         Dr Bianka Galaviz  [unfilled]  Patient Care Team:  Pina Maxwell APRN as PCP - General (Family Medicine)  Anastasiia Callahan MD as Cardiologist (Cardiology)  Bianka Galaviz MD as Consulting Physician (Pulmonary Disease)  Alvarado Rivera MD as Consulting Physician (Gastroenterology)    Chief Complaint:in 2016. She actually had a sleep study done prior to that and study was consistent with mild LOVE with AHI 5.2 events per hour.     Interval History: Patient here for annual compliance visit.  Currently set up on auto CPAP 5 to 15 cm.  Compliance 100% average daily use 6 hours 33 minutes.  AHI and leak within normal limits.  Goes to bed variable time gets a variable time but gets about 7+ hours of sleep.  She likes her new CPAP which is working great.  She feels more rested with her current CPAP.  No tobacco no alcohol no caffeine abuse.  Currently has a nasal mass that fits well.  [place] 3 out of 24 within normal limits.    REVIEW OF SYSTEMS:   CARDIOVASCULAR: No chest pain, chest pressure or chest discomfort. No palpitations or edema.   RESPIRATORY: No shortness of breath, cough or sputum.   GASTROINTESTINAL: No anorexia, nausea, vomiting or diarrhea. No abdominal pain or blood.   HEMATOLOGIC: No bleeding or bruising.     Ventilator/Non-Invasive Ventilation Settings (From admission, onward)      None              Vital Signs  Heart Rate:  [76] 76  BP: (139)/(62) 139/62  [unfilled]  Flowsheet Rows      Flowsheet Row First Filed Value   Admission Height 154.9 cm (61\") Documented at 01/27/2025 1303   Admission Weight 118 kg (260 lb) Documented at 01/27/2025 1303            Physical Exam:  Patient is examined using the personal protective equipment as per guidelines from infection control for this particular patient as enacted.  Hand hygiene was performed before and after patient interaction.   General Appearance:    Alert, cooperative, in no acute distress.  Following simple " commands  ENT Mallampati between 3 and 4 no nasal congestion  Neck midline trachea, no thyromegaly   Lungs:     Clear to auscultation, respirations regular, even and                  unlabored    Heart:    Regular rhythm and normal rate, normal S1 and S2, no            murmur, no gallop, no rub, no click   Chest Wall:    No abnormalities observed   Abdomen:     Normal bowel sounds, no masses, no organomegaly, soft        nontender, nondistended, no guarding, no rebound                tenderness   Extremities:   Moves all extremities well, no edema, no cyanosis, no             redness  CNS no focal neurological deficits normal sensory exam  Skin no rashes no nodules  Musculoskeletal no cyanosis no clubbing normal range of motion     Results Review:                                          I reviewed the patient's new clinical results.  I personally viewed and interpreted the patient's chest x-ray.        Medication Review:       No current facility-administered medications for this visit.      ASSESSMENT:   LOVE on CPAP  Obesity  Hypertension    PLAN:  Reviewed compliance download with the patient  Compliance AHI leak look excellent  Continue current auto CPAP 5 to 15 cm  Patient benefiting from CPAP  Treatment of underlying comorbidities  Sleep hygiene measures  Weight loss encouraged  We will follow-up in 1 year      Bianka Galaviz MD  01/27/25  13:08 EST

## 2025-01-30 RX ORDER — LOSARTAN POTASSIUM 100 MG/1
100 TABLET ORAL NIGHTLY
Qty: 90 TABLET | Refills: 1 | Status: SHIPPED | OUTPATIENT
Start: 2025-01-30

## 2025-03-17 RX ORDER — CARVEDILOL 12.5 MG/1
12.5 TABLET ORAL 2 TIMES DAILY WITH MEALS
Qty: 180 TABLET | Refills: 1 | Status: SHIPPED | OUTPATIENT
Start: 2025-03-17

## 2025-03-28 ENCOUNTER — OFFICE VISIT (OUTPATIENT)
Dept: FAMILY MEDICINE CLINIC | Facility: CLINIC | Age: 80
End: 2025-03-28
Payer: MEDICARE

## 2025-03-28 VITALS
WEIGHT: 265.6 LBS | BODY MASS INDEX: 50.14 KG/M2 | DIASTOLIC BLOOD PRESSURE: 64 MMHG | HEIGHT: 61 IN | OXYGEN SATURATION: 97 % | SYSTOLIC BLOOD PRESSURE: 130 MMHG | TEMPERATURE: 98 F | HEART RATE: 71 BPM

## 2025-03-28 DIAGNOSIS — R06.02 SHORTNESS OF BREATH: Primary | ICD-10-CM

## 2025-03-28 DIAGNOSIS — I10 ESSENTIAL HYPERTENSION: ICD-10-CM

## 2025-03-28 RX ORDER — METHYLPREDNISOLONE 4 MG/1
TABLET ORAL
Qty: 21 TABLET | Refills: 0 | Status: SHIPPED | OUTPATIENT
Start: 2025-03-28

## 2025-03-28 RX ORDER — ALBUTEROL SULFATE 90 UG/1
2 INHALANT RESPIRATORY (INHALATION) EVERY 4 HOURS PRN
Qty: 18 G | Refills: 0 | Status: SHIPPED | OUTPATIENT
Start: 2025-03-28

## 2025-03-28 NOTE — PROGRESS NOTES
"Chief Complaint  Shortness of Breath (Several months /No hx pulmonary issues/)    Subjective        Julee King presents to [facility] PRIMARY CARE    History of Present Illness  80 year old female presents for shortness of breath that has been present for many months. O2 is 97% in clinic today. Patient has cats but is non-smoker. Denies known allergy to pet dander. She has BMI of 50.21 and followed by sleep medicine for LOVE. Encouraged to slowly increase daily exercise as tolerated. Keep albuterol inhaler on you at all times to use as needed for shortness of breath. Will send for chest x-ray and pulmonary function testing. Hypertension is elevated at 154/64. Recheck /64.      Objective   Vital Signs:  /64   Pulse 71   Temp 98 °F (36.7 °C)   Ht 154.9 cm (60.98\")   Wt 120 kg (265 lb 9.6 oz)   SpO2 97%   BMI 50.21 kg/m²   Estimated body mass index is 50.21 kg/m² as calculated from the following:    Height as of this encounter: 154.9 cm (60.98\").    Weight as of this encounter: 120 kg (265 lb 9.6 oz).    Class 3 Severe Obesity (BMI >=40). Obesity-related health conditions include the following: obstructive sleep apnea, hypertension, dyslipidemias, and prediabetes . Obesity is unchanged. BMI is is above average; BMI management plan is completed. We discussed portion control, increasing exercise, and Information on healthy weight added to patient's after visit summary.      Physical Exam  Constitutional:       Appearance: Normal appearance.   HENT:      Head: Normocephalic.   Eyes:      Conjunctiva/sclera: Conjunctivae normal.      Pupils: Pupils are equal, round, and reactive to light.   Cardiovascular:      Rate and Rhythm: Normal rate and regular rhythm.      Pulses: Normal pulses.      Heart sounds: Normal heart sounds.   Pulmonary:      Effort: Pulmonary effort is normal. No respiratory distress.      Breath sounds: Normal breath sounds. No stridor. No wheezing or rales. "   Skin:     General: Skin is warm.   Neurological:      General: No focal deficit present.      Mental Status: She is alert and oriented to person, place, and time.   Psychiatric:         Mood and Affect: Mood normal.         Behavior: Behavior normal.        Result Review :                Assessment and Plan   Diagnoses and all orders for this visit:    1. Shortness of breath (Primary)  -     albuterol sulfate  (90 Base) MCG/ACT inhaler; Inhale 2 puffs Every 4 (Four) Hours As Needed for Shortness of Air.  Dispense: 18 g; Refill: 0  -     Complete PFT - Pre & Post Bronchodilator; Future  -     XR Chest PA & Lateral; Future  -     methylPREDNISolone (MEDROL) 4 MG dose pack; Take as directed on package instructions.  Dispense: 21 tablet; Refill: 0    2. Essential hypertension  Assessment & Plan:  Hypertension is stable and controlled  Continue current treatment regimen.  Dietary sodium restriction.  Weight loss.  Regular aerobic exercise.  Ambulatory blood pressure monitoring.  Blood pressure will be reassessed  next scheduled appt .               Follow Up   No follow-ups on file.  Patient was given instructions and counseling regarding her condition or for health maintenance advice. Please see specific information pulled into the AVS if appropriate.

## 2025-04-01 ENCOUNTER — HOSPITAL ENCOUNTER (OUTPATIENT)
Dept: GENERAL RADIOLOGY | Facility: HOSPITAL | Age: 80
Discharge: HOME | End: 2025-04-01
Payer: MEDICARE

## 2025-04-01 DIAGNOSIS — R06.02 SHORTNESS OF BREATH: ICD-10-CM

## 2025-04-01 PROCEDURE — 71046 X-RAY EXAM CHEST 2 VIEWS: CPT

## 2025-04-03 ENCOUNTER — RESULTS FOLLOW-UP (OUTPATIENT)
Dept: FAMILY MEDICINE CLINIC | Facility: CLINIC | Age: 80
End: 2025-04-03
Payer: MEDICARE

## 2025-04-03 DIAGNOSIS — R06.02 SHORTNESS OF BREATH: ICD-10-CM

## 2025-04-03 DIAGNOSIS — I77.1 TORTUOUS AORTA: Primary | ICD-10-CM

## 2025-04-03 DIAGNOSIS — R93.89 ABNORMAL CHEST X-RAY: ICD-10-CM

## 2025-04-10 ENCOUNTER — TELEPHONE (OUTPATIENT)
Dept: FAMILY MEDICINE CLINIC | Facility: CLINIC | Age: 80
End: 2025-04-10

## 2025-04-15 ENCOUNTER — OFFICE VISIT (OUTPATIENT)
Dept: CARDIOLOGY | Facility: CLINIC | Age: 80
End: 2025-04-15
Payer: MEDICARE

## 2025-04-15 VITALS
HEART RATE: 81 BPM | WEIGHT: 270.2 LBS | BODY MASS INDEX: 51.01 KG/M2 | SYSTOLIC BLOOD PRESSURE: 122 MMHG | HEIGHT: 61 IN | DIASTOLIC BLOOD PRESSURE: 68 MMHG

## 2025-04-15 DIAGNOSIS — I10 ESSENTIAL HYPERTENSION: ICD-10-CM

## 2025-04-15 DIAGNOSIS — E78.5 HYPERLIPIDEMIA, UNSPECIFIED HYPERLIPIDEMIA TYPE: ICD-10-CM

## 2025-04-15 DIAGNOSIS — I50.31 ACUTE HEART FAILURE WITH PRESERVED EJECTION FRACTION (HFPEF): ICD-10-CM

## 2025-04-15 DIAGNOSIS — R06.09 DOE (DYSPNEA ON EXERTION): ICD-10-CM

## 2025-04-15 DIAGNOSIS — I25.10 CORONARY ARTERY DISEASE INVOLVING NATIVE CORONARY ARTERY OF NATIVE HEART WITHOUT ANGINA PECTORIS: Primary | ICD-10-CM

## 2025-04-15 RX ORDER — FUROSEMIDE 20 MG/1
20 TABLET ORAL DAILY
Qty: 90 TABLET | Refills: 3 | Status: SHIPPED | OUTPATIENT
Start: 2025-04-15 | End: 2025-04-16 | Stop reason: SDUPTHER

## 2025-04-15 RX ORDER — FUROSEMIDE 20 MG/1
20 TABLET ORAL DAILY
Qty: 30 TABLET | Refills: 0 | Status: SHIPPED | OUTPATIENT
Start: 2025-04-15 | End: 2025-04-16 | Stop reason: ALTCHOICE

## 2025-04-15 RX ORDER — FUROSEMIDE 20 MG/1
20 TABLET ORAL DAILY
Qty: 90 TABLET | Refills: 3 | Status: SHIPPED | OUTPATIENT
Start: 2025-04-15 | End: 2025-04-15

## 2025-04-15 NOTE — PROGRESS NOTES
Date of Office Visit: 04/15/2025  Encounter Provider: MANDIE Archer  Place of Service: [facility] CARDIOLOGY  Established cardiologist: Anastasiia Callahan MD  Patient Name: Julee King  :1945      Patient ID:  Julee King is a 80 y.o. female is here for  followup    With a pertinent medical history of CAD, s/p CABG. she has a history of hypertension, hyperlipidemia, morbid obesity, LOVE on CPAP (Sayied).     She was the caregiver for her  who  2023.  She now lives alone.  She stays active by doing quilting and activities with her friends.     History of Present Illness   presented with chest pain and pressure and was severely anemic.  She had a GI bleed.  Troponin was elevated.  Echo at this time showed EF reduced to 33% with mild to moderate mitral insufficiency.  LHC done showed 80% distal left main stenosis, 60% distal first OM stenosis, 30% mid ramus stenosis, 30% proximal LAD stenosis, 100% occluded first diagonal, 70% mid LAD stenosis, 100% proximal RCA stenosis, 95% distal RCA stenosis.  She went on to have CABG 2016.  She received LIMA to LAD, SVG to OM1, SVG to OM 2, SVG to LV.  Endoscopy 2016 showed small hiatal hernia, normal stomach and duodenal.    2019 presented with dyspnea on exertion D-dimer was mildly elevated VQ scan was negative for PE.  2D echo showed EF 58%, mild LVH, trace valvular regurgitation.  Cardiac PET study abnormal revealing medium sized mildly severe area of ischemia in the anterior and inferior lateral wall.    Catheterization 2019 with 80% distal left main, 50% proximal LAD with 100% mid LAD stenosis, 30% proximal circumflex with 50% mid circumflex stenosis patent high large first OM branch, 100% occluded RCA her grafts were patent except SVG to OM 2 and SVG to RCA were occluded.  Medical management was recommended and if this failed consider PCI to distal left main to help with  perfusion of distal left circumflex system.    Stress nuclear study 2022 small to moderate-sized area of mild ischemia of the inferior wall.  Echo at this time showed moderate concentric LVH and grade 1 diastolic dysfunction with an EF of 60% and RVSP of 22 mmHg.    In September she was admitted with syncope.  She was bradycardic on arrival but this quickly resolved and it was felt that she did not need a pacemaker but carvedilol was decreased.  She had a coughing spell prior to syncope and it was thought it could be related to this.  In addition she had a UTI.  A 2-week ZIO done after showed occasional PACs, short burst of atrial tachycardia, rare PVCs, no significant pauses, no bradycardia, average heart rate 70 bpm.    Echocardiogram 9/12/2023 with EF 56 to 60%, G1 DD, mild RV dilation with normal function, mild to moderate left atrial dilation, normal RVSP and no significant valvular abnormalities.  A carotid duplex study at this time with no evidence of stenosis.    She last saw Dr. Callahan in the office 9/10/2024.  She was stable and no changes were made.    3/28/2025 she was seen by primary care for shortness of breath.  Albuterol inhaler, methylprednisolone were prescribed.  PFTs and a chest x-ray were ordered.  Chest x-ray done 4/1/2025 showed no acute pulmonary process.  PFTs are pending.    Today Julee presents for follow-up.  She is here with her daughter who is a nurse at [employer].  Julee tells me that she has had shortness of breath for many years over the last few months that has worsened.  She thinks the albuterol from PCP may be improving it some.  She is having shortness of breath with both exertion and rest.  When she is grocery shopping and she is stopping and catching her breath and leaning on the cart.  She is not physically active.  She has not had any chest pain or pressure.  Her legs are swollen and they are chronically so, she does not like to take water pills.  She does not think her  "legs have become more swollen recently.  She does not follow her weight.  She does not believe she has had any unusual weight gain.  She has not had any presyncope/syncope, heart racing, palpitations.  She is using her CPAP nightly.     Current Outpatient Medications on File Prior to Visit   Medication Sig Dispense Refill    albuterol sulfate  (90 Base) MCG/ACT inhaler Inhale 2 puffs Every 4 (Four) Hours As Needed for Shortness of Air. 18 g 0    amLODIPine (NORVASC) 5 MG tablet TAKE 1 TABLET DAILY 90 tablet 3    aspirin 81 MG tablet Take 1 tablet by mouth Daily.      atorvastatin (LIPITOR) 40 MG tablet Take 1 tablet by mouth Daily. 90 tablet 3    carvedilol (COREG) 12.5 MG tablet TAKE 1 TABLET TWICE A DAY WITH MEALS 180 tablet 1    losartan (COZAAR) 100 MG tablet TAKE 1 TABLET EVERY NIGHT 90 tablet 1    NON FORMULARY C PAP      [DISCONTINUED] methylPREDNISolone (MEDROL) 4 MG dose pack Take as directed on package instructions. 21 tablet 0     No current facility-administered medications on file prior to visit.         Procedures    ECG 12 Lead    Date/Time: 4/15/2025 8:57 AM  Performed by: Nelli Kitchen APRN    Authorized by: Nelli Kitchen APRN  Comparison: compared with previous ECG from 9/10/2024  Comparison to previous ECG: Anteroseptal T wave inversion  Rhythm: sinus rhythm  BPM: 81              Objective:      Vitals:    04/15/25 0835   BP: 122/68   Pulse: 81   Weight: 123 kg (270 lb 3.2 oz)   Height: 154.9 cm (61\")     Body mass index is 51.05 kg/m².  Wt Readings from Last 3 Encounters:   04/15/25 123 kg (270 lb 3.2 oz)   03/28/25 120 kg (265 lb 9.6 oz)   01/27/25 118 kg (260 lb)            Constitutional:       Comments: Julee is 80-year-old  female who is morbidly obese, in no acute distress   Eyes:      Pupils: Pupils are equal, round, and reactive to light.   Neck:      Comments: Increased neck circumference limiting exam, no appreciable JVD  Pulmonary:      Effort: Pulmonary " effort is normal.      Comments: Bilateral lung sounds are diminished in the lower lobes  Cardiovascular:      Normal rate. Regular rhythm.      Murmurs: There is no murmur.   Pulses:     Radial: 2+ bilaterally.     Dorsalis pedis: 1+ bilaterally.     Posterior tibial: 1+ bilaterally.  Edema:     Pretibial: bilateral 3+ edema of the pretibial area.     Ankle: bilateral 3+ edema of the ankle.     Feet: bilateral 3+ edema of the feet.  Abdominal:      General: Bowel sounds are normal.      Palpations: Abdomen is soft.   Musculoskeletal:      Cervical back: Normal range of motion. Skin:     General: Skin is warm and dry.   Neurological:      Mental Status: Alert and oriented to person, place and time.   Psychiatric:         Speech: Speech normal.         Lab Review:   CMP 9/25/2024 , chloride 108 CBC unremarkable    Assessment:     1. Coronary artery disease involving native coronary artery of native heart without angina pectoris    2. HOBBS (dyspnea on exertion)    3. Essential hypertension    4. Hyperlipidemia, unspecified hyperlipidemia type    5. Acute heart failure with preserved ejection fraction (HFpEF)        CAD, s/p CABG 1/2016, has occluded SVG to RCA and occluded SVG to OM1 to, treated medically.  Stress nuclear perfusion study done 2/2022 showed inferior wall ischemia, she had no unstable symptoms and has been treated medically.  If she develops anginal type chest pain, she could theoretically have a left main intervention to improve perfusion to the left circumflex system.  History of GI bleeding 1/2016  Hypertension, well-controlled  Hyperlipidemia, on atorvastatin  LOVE on CPAP  Obesity.    Plan:   Furosemide added today  Echocardiogram ordered  Follow daily weights  See me in 3 to 4 weeks lab evaluation at that time  I am going to discuss her symptoms with Dr. Callahan given known occlusion to 2 bypass grafts previously and prior recommendation for PCI to left main if medical management not  sufficient.             Thank you for allowing me to participate in this patient's care. Please call with any questions or concerns.          Dragon dictation device was utilized in this note.

## 2025-04-16 ENCOUNTER — TELEPHONE (OUTPATIENT)
Dept: CARDIOLOGY | Age: 80
End: 2025-04-16
Payer: MEDICARE

## 2025-04-16 RX ORDER — FUROSEMIDE 20 MG/1
20 TABLET ORAL DAILY
Qty: 90 TABLET | Refills: 3 | Status: SHIPPED | OUTPATIENT
Start: 2025-04-16

## 2025-04-16 NOTE — TELEPHONE ENCOUNTER
Pt called and left VM yesterday after her appointment and stated you added Lasix (which I did see her office visit note) but it was ordered twice. One was sent to [facility] for 30 day and one sent to [facility] for 90.    Pt wants to cancel the one that was sent to [email or web address]. She has a 40 mg and wants to know if they can just be cut in half?    Please advise

## 2025-04-16 NOTE — TELEPHONE ENCOUNTER
I spoke with Julee King and gave them message from the provider.  She says that you'll need to resubmit the [facility] prescription because it was cancelled.  She couldn't elaborate any further as to the issue.    Triage [facility]  04/16/25 08:32 EDT

## 2025-04-17 ENCOUNTER — TELEPHONE (OUTPATIENT)
Dept: CARDIOLOGY | Facility: CLINIC | Age: 80
End: 2025-04-17
Payer: MEDICARE

## 2025-04-18 NOTE — TELEPHONE ENCOUNTER
Patient called and stated that she was returning your phone call.  She has asked that you only call her on the following number: [phone].    Thanks,  Patricio

## 2025-04-23 DIAGNOSIS — I25.10 CORONARY ARTERY DISEASE INVOLVING NATIVE CORONARY ARTERY OF NATIVE HEART WITHOUT ANGINA PECTORIS: Primary | ICD-10-CM

## 2025-04-23 PROBLEM — R06.09 DOE (DYSPNEA ON EXERTION): Status: ACTIVE | Noted: 2025-04-17

## 2025-04-23 RX ORDER — DIPHENHYDRAMINE HYDROCHLORIDE 50 MG/ML
50 INJECTION, SOLUTION INTRAMUSCULAR; INTRAVENOUS
OUTPATIENT
Start: 2025-04-23 | End: 2025-04-23

## 2025-04-23 RX ORDER — DIPHENHYDRAMINE HCL 25 MG
50 TABLET ORAL
OUTPATIENT
Start: 2025-04-23 | End: 2025-04-23

## 2025-04-23 RX ORDER — PREDNISONE 50 MG/1
50 TABLET ORAL TAKE AS DIRECTED
Qty: 3 TABLET | Refills: 0 | Status: SHIPPED | OUTPATIENT
Start: 2025-04-23

## 2025-04-24 ENCOUNTER — LAB (OUTPATIENT)
Dept: LAB | Facility: HOSPITAL | Age: 80
End: 2025-04-24
Payer: MEDICARE

## 2025-04-24 DIAGNOSIS — I25.10 CORONARY ARTERY DISEASE INVOLVING NATIVE CORONARY ARTERY OF NATIVE HEART WITHOUT ANGINA PECTORIS: ICD-10-CM

## 2025-04-24 DIAGNOSIS — R93.89 ABNORMAL CHEST X-RAY: Primary | ICD-10-CM

## 2025-04-24 DIAGNOSIS — R06.02 SHORTNESS OF BREATH: ICD-10-CM

## 2025-04-24 LAB
ANION GAP SERPL CALCULATED.3IONS-SCNC: 9.1 MMOL/L (ref 5–15)
BUN SERPL-MCNC: 18 MG/DL (ref 8–23)
BUN/CREAT SERPL: 20.7 (ref 7–25)
CALCIUM SPEC-SCNC: 8.8 MG/DL (ref 8.6–10.5)
CHLORIDE SERPL-SCNC: 108 MMOL/L (ref 98–107)
CO2 SERPL-SCNC: 25.9 MMOL/L (ref 22–29)
CREAT SERPL-MCNC: 0.87 MG/DL (ref 0.57–1)
DEPRECATED RDW RBC AUTO: 41.7 FL (ref 37–54)
EGFRCR SERPLBLD CKD-EPI 2021: 67.9 ML/MIN/1.73
EOSINOPHIL # BLD MANUAL: 0.07 10*3/MM3 (ref 0–0.4)
EOSINOPHIL NFR BLD MANUAL: 1 % (ref 0.3–6.2)
ERYTHROCYTE [DISTWIDTH] IN BLOOD BY AUTOMATED COUNT: 19.2 % (ref 12.3–15.4)
GLUCOSE SERPL-MCNC: 108 MG/DL (ref 65–99)
HCT VFR BLD AUTO: 32.2 % (ref 34–46.6)
HGB BLD-MCNC: 9 G/DL (ref 12–15.9)
LYMPHOCYTES # BLD MANUAL: 1.74 10*3/MM3 (ref 0.7–3.1)
LYMPHOCYTES NFR BLD MANUAL: 5 % (ref 5–12)
MCH RBC QN AUTO: 17.6 PG (ref 26.6–33)
MCHC RBC AUTO-ENTMCNC: 28 G/DL (ref 31.5–35.7)
MCV RBC AUTO: 63 FL (ref 79–97)
MONOCYTES # BLD: 0.36 10*3/MM3 (ref 0.1–0.9)
NEUTROPHILS # BLD AUTO: 5.06 10*3/MM3 (ref 1.7–7)
NEUTROPHILS NFR BLD MANUAL: 70 % (ref 42.7–76)
PLAT MORPH BLD: NORMAL
PLATELET # BLD AUTO: 266 10*3/MM3 (ref 140–450)
POTASSIUM SERPL-SCNC: 4.3 MMOL/L (ref 3.5–5.2)
RBC # BLD AUTO: 5.11 10*6/MM3 (ref 3.77–5.28)
RBC MORPH BLD: NORMAL
SODIUM SERPL-SCNC: 143 MMOL/L (ref 136–145)
VARIANT LYMPHS NFR BLD MANUAL: 24 % (ref 19.6–45.3)
WBC MORPH BLD: NORMAL
WBC NRBC COR # BLD AUTO: 7.23 10*3/MM3 (ref 3.4–10.8)

## 2025-04-24 PROCEDURE — 36415 COLL VENOUS BLD VENIPUNCTURE: CPT

## 2025-04-24 PROCEDURE — 85025 COMPLETE CBC W/AUTO DIFF WBC: CPT

## 2025-04-24 PROCEDURE — 85007 BL SMEAR W/DIFF WBC COUNT: CPT

## 2025-04-24 PROCEDURE — 80048 BASIC METABOLIC PNL TOTAL CA: CPT

## 2025-04-30 ENCOUNTER — OFFICE VISIT (OUTPATIENT)
Dept: FAMILY MEDICINE CLINIC | Facility: CLINIC | Age: 80
End: 2025-04-30
Payer: MEDICARE

## 2025-04-30 VITALS
TEMPERATURE: 98 F | OXYGEN SATURATION: 98 % | SYSTOLIC BLOOD PRESSURE: 136 MMHG | WEIGHT: 262.4 LBS | HEART RATE: 82 BPM | HEIGHT: 61 IN | DIASTOLIC BLOOD PRESSURE: 62 MMHG | BODY MASS INDEX: 49.54 KG/M2

## 2025-04-30 DIAGNOSIS — I10 ESSENTIAL HYPERTENSION: ICD-10-CM

## 2025-04-30 DIAGNOSIS — D50.9 MICROCYTIC ANEMIA: Primary | ICD-10-CM

## 2025-04-30 DIAGNOSIS — Z86.0100 HISTORY OF COLON POLYPS: ICD-10-CM

## 2025-04-30 RX ORDER — FERROUS SULFATE 325(65) MG
325 TABLET ORAL
Qty: 30 TABLET | Refills: 2 | Status: SHIPPED | OUTPATIENT
Start: 2025-04-30

## 2025-04-30 NOTE — ASSESSMENT & PLAN NOTE
- Anemia may be contributing to her dyspnea.   - History of polyps and bleeding   - Referral to Gastroenterology for scope. A fecal occult blood test will be conducted to determine if the bleeding source is gastrointestinal. Laboratory tests, including iron studies, will be repeated today.  - Initiate iron supplement and advise increasing dietary iron intake with leafy green vegetables and lean red meats. Iron supplement should be taken with a small amount of orange juice to enhance absorption. If constipation or stomach upset occurs, alternative formulations will be considered.  - Immediate medical attention at the emergency room is advised if blood is observed in the stool, stool appears dark and tarry, or if vomiting blood or material resembling coffee grounds occurs.  - Follow up with Pina in 4 weeks

## 2025-04-30 NOTE — ASSESSMENT & PLAN NOTE
Hypertension is stable and controlled  Continue current treatment regimen.  Blood pressure will be reassessed  at next scheduled appointment .

## 2025-04-30 NOTE — PROGRESS NOTES
"Chief Complaint  Anemia (Follow up/Labs drawn 4/24)    Subjective          Julee King presents to [facility]                  History of Present Illness  The patient is a 80-year-old female here today to follow up on anemia.    She is followed by Dr. Callahan, cardiology for coronary artery disease, hypertension, and heart failure.  She was last seen on 4/15/2025.  Anemia was detected in pre-procedural labs prior to a scheduled heart catheterization, which was subsequently postponed. Hemoglobin was 9 and hematocrit 32.2 on 4/24/2025. An echocardiogram is planned for Friday. She has a documented history of anemia, which was also present during her cardiac surgery. Consultation with a hematologist has not occurred. No bleeding or abnormal bruising is reported. There are no signs of melena, hematemesis, or vomiting of coffee ground-like material. She had GI bleeding in 2016. The last colonoscopy was performed in 2016, during which a polyps were removed.     Dietary habits include inadequate vegetable intake, which she attributes to her anemia. No dizziness or lightheadedness is reported. There is no abdominal pain, changes in appetite, bowel pattern, or stool appearance. Unintentional weight loss or night sweats are not experienced. Iron supplements were previously taken for anemia without adverse effects.    Her last consultation with Pina was approximately one month ago, during which dyspnea was reported.    With hypertension, blood pressure today is 136/62        Objective   Vital Signs:  /62 (BP Location: Left arm, Patient Position: Sitting, Cuff Size: Large Adult)   Pulse 82   Temp 98 °F (36.7 °C)   Ht 154.9 cm (60.98\")   Wt 119 kg (262 lb 6.4 oz)   SpO2 98%   BMI 49.61 kg/m²   Estimated body mass index is 49.61 kg/m² as calculated from the following:    Height as of this encounter: 154.9 cm (60.98\").    Weight as of this encounter: 119 kg (262 lb 6.4 oz).      "       Physical Exam  Constitutional:       Appearance: Normal appearance.   HENT:      Head: Normocephalic.   Cardiovascular:      Rate and Rhythm: Normal rate and regular rhythm.      Heart sounds: Normal heart sounds.   Pulmonary:      Effort: Pulmonary effort is normal.      Breath sounds: Normal breath sounds.   Abdominal:      General: Abdomen is flat. Bowel sounds are normal.      Palpations: Abdomen is soft.      Tenderness: There is no abdominal tenderness.   Musculoskeletal:      Cervical back: Neck supple.   Neurological:      Mental Status: She is alert and oriented to person, place, and time.   Psychiatric:         Mood and Affect: Mood normal.        Result Review :                Assessment and Plan   Diagnoses and all orders for this visit:    1. Microcytic anemia (Primary)  Assessment & Plan:  - Anemia may be contributing to her dyspnea.   - History of polyps and bleeding   - Referral to Gastroenterology for scope. A fecal occult blood test will be conducted to determine if the bleeding source is gastrointestinal. Laboratory tests, including iron studies, will be repeated today.  - Initiate iron supplement and advise increasing dietary iron intake with leafy green vegetables and lean red meats. Iron supplement should be taken with a small amount of orange juice to enhance absorption. If constipation or stomach upset occurs, alternative formulations will be considered.  - Immediate medical attention at the emergency room is advised if blood is observed in the stool, stool appears dark and tarry, or if vomiting blood or material resembling coffee grounds occurs.    Orders:  -     CBC & Differential  -     Ferritin  -     Iron Profile  -     Reticulocytes  -     Occult Blood, Fecal By Immunoassay - Stool, Per Rectum; Future  -     ferrous sulfate 325 (65 FE) MG tablet; Take 1 tablet by mouth Daily With Breakfast.  Dispense: 30 tablet; Refill: 2  -     Ambulatory Referral to Gastroenterology    2.  History of colon polyps  -     Occult Blood, Fecal By Immunoassay - Stool, Per Rectum; Future  -     Ambulatory Referral to Gastroenterology    3. Essential hypertension  Assessment & Plan:  Hypertension is stable and controlled  Continue current treatment regimen.  Blood pressure will be reassessed  at next scheduled appointment .             Patient agrees with plan of care and understands instructions. Call if worsening symptoms or any problems or concerns.     EMR Dragon/Transcription Disclaimer:  Much of this encounter note is an electronic transcription/translation of spoken language to printed text.  The electronic translation of spoken language may permit erroneous, or at times, nonsensical words or phrases to be inadvertently transcribed; Although I have reviewed the note for such errors, some may still exist.  .            Follow Up   Return in about 4 weeks (around 5/28/2025) for Recheck anemia with Pina .  Patient was given instructions and counseling regarding her condition or for health maintenance advice. Please see specific information pulled into the AVS if appropriate.     Patient or patient representative verbalized consent for the use of Ambient Listening during the visit with  MANDIE Cuba for chart documentation. 4/30/2025  15:22 EDT

## 2025-05-01 LAB
BASOPHILS # BLD MANUAL: 0.09 10*3/MM3 (ref 0–0.2)
BASOPHILS NFR BLD MANUAL: 1 % (ref 0–1.5)
DIFFERENTIAL COMMENT: ABNORMAL
EOSINOPHIL # BLD MANUAL: 0.09 10*3/MM3 (ref 0–0.4)
EOSINOPHIL NFR BLD MANUAL: 1 % (ref 0.3–6.2)
ERYTHROCYTE [DISTWIDTH] IN BLOOD BY AUTOMATED COUNT: 19.5 % (ref 12.3–15.4)
FERRITIN SERPL-MCNC: 10.5 NG/ML (ref 13–150)
HCT VFR BLD AUTO: 33.3 % (ref 34–46.6)
HGB BLD-MCNC: 9.1 G/DL (ref 12–15.9)
IRON SATN MFR SERPL: 4 % (ref 20–50)
IRON SERPL-MCNC: 19 MCG/DL (ref 37–145)
LYMPHOCYTES # BLD MANUAL: 1.66 10*3/MM3 (ref 0.7–3.1)
LYMPHOCYTES NFR BLD MANUAL: 18.8 % (ref 19.6–45.3)
MCH RBC QN AUTO: 17.5 PG (ref 26.6–33)
MCHC RBC AUTO-ENTMCNC: 27.3 G/DL (ref 31.5–35.7)
MCV RBC AUTO: 63.9 FL (ref 79–97)
MONOCYTES # BLD MANUAL: 0.46 10*3/MM3 (ref 0.1–0.9)
MONOCYTES NFR BLD MANUAL: 5.2 % (ref 5–12)
NEUTROPHILS # BLD MANUAL: 6.52 10*3/MM3 (ref 1.7–7)
NEUTROPHILS NFR BLD MANUAL: 74 % (ref 42.7–76)
PLATELET # BLD AUTO: 309 10*3/MM3 (ref 140–450)
PLATELET BLD QL SMEAR: ABNORMAL
RBC # BLD AUTO: 5.21 10*6/MM3 (ref 3.77–5.28)
RBC MORPH BLD: ABNORMAL
RETICS/RBC NFR AUTO: 1.3 % (ref 0.7–1.9)
TIBC SERPL-MCNC: 450 MCG/DL
UIBC SERPL-MCNC: 431 MCG/DL (ref 112–346)
WBC # BLD AUTO: 8.81 10*3/MM3 (ref 3.4–10.8)

## 2025-05-02 ENCOUNTER — HOSPITAL ENCOUNTER (OUTPATIENT)
Dept: CARDIOLOGY | Facility: HOSPITAL | Age: 80
Discharge: HOME | End: 2025-05-02
Payer: MEDICARE

## 2025-05-02 VITALS
SYSTOLIC BLOOD PRESSURE: 132 MMHG | BODY MASS INDEX: 51.44 KG/M2 | HEART RATE: 70 BPM | DIASTOLIC BLOOD PRESSURE: 70 MMHG | HEIGHT: 60 IN | WEIGHT: 262 LBS

## 2025-05-02 DIAGNOSIS — I25.10 CORONARY ARTERY DISEASE INVOLVING NATIVE CORONARY ARTERY OF NATIVE HEART WITHOUT ANGINA PECTORIS: ICD-10-CM

## 2025-05-02 DIAGNOSIS — R06.09 DOE (DYSPNEA ON EXERTION): ICD-10-CM

## 2025-05-02 LAB
AORTIC DIMENSIONLESS INDEX: 0.67 (DI)
ASCENDING AORTA: 3.2 CM
AV MEAN PRESS GRAD SYS DOP V1V2: 3.2 MMHG
AV VMAX SYS DOP: 115.8 CM/SEC
BH CV ECHO MEAS - ACS: 2.03 CM
BH CV ECHO MEAS - AO MAX PG: 5.4 MMHG
BH CV ECHO MEAS - AO ROOT AREA (BSA CORRECTED): 1.7 CM2
BH CV ECHO MEAS - AO ROOT DIAM: 3.6 CM
BH CV ECHO MEAS - AO V2 VTI: 25.7 CM
BH CV ECHO MEAS - AVA(I,D): 2.05 CM2
BH CV ECHO MEAS - EDV(CUBED): 112.8 ML
BH CV ECHO MEAS - EDV(MOD-SP2): 84 ML
BH CV ECHO MEAS - EDV(MOD-SP4): 124 ML
BH CV ECHO MEAS - EF(MOD-SP2): 61.9 %
BH CV ECHO MEAS - EF(MOD-SP4): 58.1 %
BH CV ECHO MEAS - ESV(MOD-SP2): 32 ML
BH CV ECHO MEAS - ESV(MOD-SP4): 52 ML
BH CV ECHO MEAS - FS: 36.9 %
BH CV ECHO MEAS - IVS/LVPW: 0.98 CM
BH CV ECHO MEAS - IVSD: 1.2 CM
BH CV ECHO MEAS - LAT PEAK E' VEL: 10.4 CM/SEC
BH CV ECHO MEAS - LV DIASTOLIC VOL/BSA (35-75): 59.2 CM2
BH CV ECHO MEAS - LV MASS(C)D: 224.1 GRAMS
BH CV ECHO MEAS - LV MAX PG: 2.9 MMHG
BH CV ECHO MEAS - LV MEAN PG: 1 MMHG
BH CV ECHO MEAS - LV SYSTOLIC VOL/BSA (12-30): 24.8 CM2
BH CV ECHO MEAS - LV V1 MAX: 84.8 CM/SEC
BH CV ECHO MEAS - LV V1 VTI: 17.2 CM
BH CV ECHO MEAS - LVIDD: 4.8 CM
BH CV ECHO MEAS - LVIDS: 3 CM
BH CV ECHO MEAS - LVOT AREA: 3.1 CM2
BH CV ECHO MEAS - LVOT DIAM: 1.98 CM
BH CV ECHO MEAS - LVPWD: 1.22 CM
BH CV ECHO MEAS - MED PEAK E' VEL: 8.7 CM/SEC
BH CV ECHO MEAS - MV A DUR: 0.14 SEC
BH CV ECHO MEAS - MV A MAX VEL: 61.7 CM/SEC
BH CV ECHO MEAS - MV DEC SLOPE: 399.6 CM/SEC2
BH CV ECHO MEAS - MV DEC TIME: 0.26 SEC
BH CV ECHO MEAS - MV E MAX VEL: 46.1 CM/SEC
BH CV ECHO MEAS - MV E/A: 0.75
BH CV ECHO MEAS - MV MAX PG: 1.58 MMHG
BH CV ECHO MEAS - MV MEAN PG: 0.78 MMHG
BH CV ECHO MEAS - MV P1/2T: 46.1 MSEC
BH CV ECHO MEAS - MV V2 VTI: 20.3 CM
BH CV ECHO MEAS - MVA(P1/2T): 4.8 CM2
BH CV ECHO MEAS - MVA(VTI): 2.6 CM2
BH CV ECHO MEAS - PA V2 MAX: 101.9 CM/SEC
BH CV ECHO MEAS - PULM A REVS DUR: 0.13 SEC
BH CV ECHO MEAS - PULM A REVS VEL: 29.3 CM/SEC
BH CV ECHO MEAS - PULM DIAS VEL: 38.6 CM/SEC
BH CV ECHO MEAS - PULM S/D: 0.87
BH CV ECHO MEAS - PULM SYS VEL: 33.7 CM/SEC
BH CV ECHO MEAS - QP/QS: 0.93
BH CV ECHO MEAS - RAP SYSTOLE: 3 MMHG
BH CV ECHO MEAS - RV MAX PG: 0.95 MMHG
BH CV ECHO MEAS - RV V1 MAX: 48.8 CM/SEC
BH CV ECHO MEAS - RV V1 VTI: 12.2 CM
BH CV ECHO MEAS - RVOT DIAM: 2.26 CM
BH CV ECHO MEAS - SV(LVOT): 52.8 ML
BH CV ECHO MEAS - SV(MOD-SP2): 52 ML
BH CV ECHO MEAS - SV(MOD-SP4): 72 ML
BH CV ECHO MEAS - SV(RVOT): 48.8 ML
BH CV ECHO MEAS - SVI(LVOT): 25.2 ML/M2
BH CV ECHO MEAS - SVI(MOD-SP2): 24.8 ML/M2
BH CV ECHO MEAS - SVI(MOD-SP4): 34.4 ML/M2
BH CV ECHO MEAS - TAPSE (>1.6): 1.31 CM
BH CV ECHO MEASUREMENTS AVERAGE E/E' RATIO: 4.83
BH CV XLRA - RV BASE: 3.7 CM
BH CV XLRA - RV LENGTH: 6.7 CM
BH CV XLRA - RV MID: 2.47 CM
BH CV XLRA - TDI S': 8.3 CM/SEC
LEFT ATRIUM VOLUME INDEX: 27.5 ML/M2
LV EF BIPLANE MOD: 59.8 %
SINUS: 3.6 CM
STJ: 2.9 CM

## 2025-05-02 PROCEDURE — 25510000001 PERFLUTREN 6.52 MG/ML SUSPENSION 2 ML VIAL: Performed by: FAMILY MEDICINE

## 2025-05-02 PROCEDURE — 93306 TTE W/DOPPLER COMPLETE: CPT

## 2025-05-02 RX ADMIN — PERFLUTREN 1 ML: 6.52 INJECTION, SUSPENSION INTRAVENOUS at 13:49

## 2025-05-09 ENCOUNTER — OFFICE VISIT (OUTPATIENT)
Dept: GASTROENTEROLOGY | Facility: CLINIC | Age: 80
End: 2025-05-09
Payer: MEDICARE

## 2025-05-09 ENCOUNTER — PATIENT ROUNDING (BHMG ONLY) (OUTPATIENT)
Dept: GASTROENTEROLOGY | Facility: CLINIC | Age: 80
End: 2025-05-09
Payer: MEDICARE

## 2025-05-09 VITALS
DIASTOLIC BLOOD PRESSURE: 68 MMHG | BODY MASS INDEX: 51.75 KG/M2 | HEIGHT: 60 IN | WEIGHT: 263.6 LBS | SYSTOLIC BLOOD PRESSURE: 120 MMHG

## 2025-05-09 DIAGNOSIS — D50.0 IRON DEFICIENCY ANEMIA DUE TO CHRONIC BLOOD LOSS: ICD-10-CM

## 2025-05-09 DIAGNOSIS — Z12.11 ENCOUNTER FOR SCREENING FOR MALIGNANT NEOPLASM OF COLON: Primary | ICD-10-CM

## 2025-05-09 NOTE — PROGRESS NOTES
"    PATIENT INFORMATION  Julee King       - 1945    CHIEF COMPLAINT  Chief Complaint   Patient presents with    Microcytic anemiia       HISTORY OF PRESENT ILLNESS    Here today for evaluation of microcytic anemia    2025 HGB 9.1 (13.1 ), MCV 63.9, she has not returned her FOBT., BMP unremarkable.  She denies melena and hematochezia. Stools are dark with liquorice. She attributes to diet, not eating much red meat or veggies.   Started an iron a week ago and tolerating well.   She does eat a lot of ice  SOA is a little worse PFTs this month, managed by Dr. Mcknight and compliant with CPAP, no dizziness. She is scheduled for a lung test, lately she is fatigued.  She denies blood donations.    2016 Colonoscopy with adenomas  2017 EGD with Ulcer    She denies stomach issues. She can eat anything she wants without issue.     Bowels are daily, easy, well controlled, without pain.        REVIEWED PERTINENT RESULTS/ LABS  Lab Results   Component Value Date    CASEREPORT  2017     Surgical Pathology Report                         Case: [id number]                                  Authorizing Provider:  Alvarado Rivera MD         Collected:           2017 10:48 AM          Ordering Location:     [facility]  Received:            2017 12:51 PM                                 ENDO SUITES                                                                  Pathologist:           Evaristo Benz MD                                                       Specimen:    Stomach, GASTRIC ULCER BIOPSIES                                                            FINALDX  2017     1.  \"GASTRIC ULCER BIOPSIES\":   MILD CHRONIC ACTIVE GASTRITIS.   NO INTESTINAL METAPLASIA.    DETACHED FRAGMENTS OF FIBRINONECROTIC DEBRIS (ULCER BED?).   DISTINCT ULCER NOT IDENTIFIED.   DIFF-QUIK STAIN:  NO HELICOBACTER SEEN.     ALISA/brb IHC/a/TDJ  CPT: 19961, 79594       Lab Results   Component " Value Date    HGB 9.1 (L) 04/30/2025    MCV 63.9 (L) 04/30/2025     04/30/2025    ALT 9 09/25/2024    AST 12 09/25/2024    HGBA1C 6.10 (H) 09/10/2023    INR 1.09 09/10/2023    TRIG 75 08/25/2023    FERRITIN 10.50 (L) 04/30/2025    IRON 12 (L) 01/23/2016    TIBC 450 04/30/2025      Adult Transthoracic Echo Complete w/ Color, Spectral and Contrast if Necessary Per Protocol  Result Date: 5/2/2025  Narrative:   Left ventricular systolic function is normal. Left ventricular ejection fraction appears to be 56 - 60%.   Left ventricular wall thickness is consistent with mild concentric hypertrophy.   Left ventricular diastolic function was normal.       REVIEW OF SYSTEMS  Review of Systems      ACTIVE PROBLEMS  Patient Active Problem List    Diagnosis     Encounter for screening for malignant neoplasm of colon [Z12.11]     Iron deficiency anemia due to chronic blood loss [D50.0]     Microcytic anemia [D50.9]     HOBBS (dyspnea on exertion) [R06.09]     Acute heart failure with preserved ejection fraction (HFpEF) [I50.31]     Acute UTI [N39.0]     Prediabetes [R73.03]     Syncope and collapse [R55]     Bradycardia [R00.1]     Abnormal urine [R82.90]     Hyperglycemia [R73.9]     LVH (left ventricular hypertrophy) [I51.7]     Diastolic dysfunction [I51.89]     Shortness of breath [R06.02]     Obesity [E66.9]     Essential hypertension [I10]     Hyperlipidemia [E78.5]     Obstructive sleep apnea [G47.33]     Adiposity [E66.9]     Coronary artery disease [I25.10]          PAST MEDICAL HISTORY  Past Medical History:   Diagnosis Date    Bronchitis 05/2016    Cardiomyopathy     Carotid artery disease     16-49% stenosis of the left internal carotid 2016; carotid duplex 1/2017 - normal    Cataract 2015    Cholelithiasis 2015    Colon polyps     Coronary artery disease 01/2016    Non-STEMI and status post CABG    Diverticulosis     Dyslipidemia     GI bleed     Severely anemic and received a blood transfusion    Grade I  diastolic dysfunction 02/08/2022    Per echocardiogram 2/2022    Headache     Hiatal hernia     Hyperlipidemia     Hypertension     Hypocalcemia     Hypokalemia     LVH (left ventricular hypertrophy) 02/08/2022    Mild to moderate per echo 2/2022    Non-STEMI (non-ST elevated myocardial infarction) 01/2016    Obesity     LOVE (obstructive sleep apnea)     moderate to severe; compliant with CPAP machine    PVC (premature ventricular contraction)          SURGICAL HISTORY  Past Surgical History:   Procedure Laterality Date    CARDIAC CATHETERIZATION N/A 02/11/2019    Procedure: Coronary angiography;  Surgeon: Cheryl Almeida MD;  Location:  [facility];  Service: Cardiovascular    CARDIAC CATHETERIZATION N/A 02/11/2019    Procedure: Left Heart Cath;  Surgeon: Cheryl Almeida MD;  Location:  [facility];  Service: Cardiovascular    CARDIAC CATHETERIZATION N/A 02/11/2019    Procedure: Left ventriculography;  Surgeon: Cheryl Almeida MD;  Location:  [facility];  Service: Cardiovascular    CARDIAC CATHETERIZATION  02/11/2019    Procedure: Saphenous Vein Graft;  Surgeon: Cheryl Almeida MD;  Location:  [facility];  Service: Cardiovascular    CARDIAC CATHETERIZATION N/A 02/11/2019    Procedure: Native mammary injection;  Surgeon: Cheryl Almeida MD;  Location:  [facility];  Service: Cardiovascular    CARDIAC SURGERY      CHOLECYSTECTOMY      COLONOSCOPY N/A 06/08/2016    Procedure: COLONOSCOPY to cecum with cold biopsy polypectomies;  Surgeon: Alvarado Rivera MD;  Location[facility];  Service:     CORONARY ARTERY BYPASS GRAFT  02/28/2016    CABG x4  Dr Monroy;  CHOI to LAD, SVG to obtuse marginal 1, SVG to obtuse marginal 2, and SVG to LV branch    ENDOSCOPY N/A 06/08/2016    Procedure: ESOPHAGOGASTRODUODENOSCOPY with biopsy;  Surgeon: Alvarado Rivera MD;  [facility];  Service:     ENTEROSCOPY SMALL BOWEL N/A  08/02/2017    Procedure: ENTEROSCOPY SMALL BOWEL WITH COLD BIOPSIES AND ARGON PLASMA COAGULATION TO  GASTIC ULCERS;  Surgeon: Alvarado Rivera MD;  Location: [facility] ENDOSCOPY;  Service:     EYE SURGERY      2015    HERNIA REPAIR      JOINT REPLACEMENT      vijaya knees    OTHER SURGICAL HISTORY      KNEE REPLACEMENT    OTHER SURGICAL HISTORY      TREATMENT OF ANKLE FRACTURE    OTHER SURGICAL HISTORY      TREATMENT FRACTURE OF THE HAND         FAMILY HISTORY  Family History   Problem Relation Age of Onset    Coronary artery disease Mother     Other Father     Coronary artery disease Father     Stroke Sister     Asthma Sister     Heart attack Sister     Heart attack Sister     Heart attack Brother     Asthma Sister     Stroke Sister     Heart attack Brother          SOCIAL HISTORY  Social History     Occupational History    Not on file   Tobacco Use    Smoking status: Never     Passive exposure: Never    Smokeless tobacco: Never   Vaping Use    Vaping status: Never Used   Substance and Sexual Activity    Alcohol use: Not Currently     Comment: WINE MAYBE EVERY COUPLE OF MONTHS    Drug use: Never    Sexual activity: Not Currently     Partners: Male     Birth control/protection: None         CURRENT MEDICATIONS    Current Outpatient Medications:     albuterol sulfate  (90 Base) MCG/ACT inhaler, Inhale 2 puffs Every 4 (Four) Hours As Needed for Shortness of Air., Disp: 18 g, Rfl: 0    amLODIPine (NORVASC) 5 MG tablet, TAKE 1 TABLET DAILY, Disp: 90 tablet, Rfl: 3    aspirin 81 MG tablet, Take 1 tablet by mouth Daily., Disp: , Rfl:     atorvastatin (LIPITOR) 40 MG tablet, Take 1 tablet by mouth Daily., Disp: 90 tablet, Rfl: 3    carvedilol (COREG) 12.5 MG tablet, TAKE 1 TABLET TWICE A DAY WITH MEALS, Disp: 180 tablet, Rfl: 1    ferrous sulfate 325 (65 FE) MG tablet, Take 1 tablet by mouth Daily With Breakfast., Disp: 30 tablet, Rfl: 2    furosemide (LASIX) 20 MG tablet, Take 1 tablet by mouth Daily., Disp: 90 tablet,  "Rfl: 3    losartan (COZAAR) 100 MG tablet, TAKE 1 TABLET EVERY NIGHT, Disp: 90 tablet, Rfl: 1    NON FORMULARY, C PAP, Disp: , Rfl:     ALLERGIES  Codeine, Iodinated contrast media, Hctz [hydrochlorothiazide], and Sulfa antibiotics    VITALS  Vitals:    05/09/25 0837   BP: 120/68   BP Location: Left arm   Patient Position: Sitting   Cuff Size: Large Adult   Weight: 120 kg (263 lb 9.6 oz)   Height: 152.4 cm (60\")       PHYSICAL EXAM  Debilities/Disabilities Identified: None  Emotional Behavior: Appropriate  Wt Readings from Last 3 Encounters:   05/09/25 120 kg (263 lb 9.6 oz)   05/02/25 119 kg (262 lb)   04/30/25 119 kg (262 lb 6.4 oz)     Ht Readings from Last 1 Encounters:   05/09/25 152.4 cm (60\")     Body mass index is 51.48 kg/m².  Physical Exam  Constitutional:       General: She is not in acute distress.     Appearance: Normal appearance. She is not ill-appearing.   HENT:      Head: Normocephalic and atraumatic.      Mouth/Throat:      Mouth: Mucous membranes are moist.      Pharynx: No posterior oropharyngeal erythema.   Eyes:      General: No scleral icterus.  Cardiovascular:      Rate and Rhythm: Normal rate and regular rhythm.      Heart sounds: Normal heart sounds.   Pulmonary:      Effort: Pulmonary effort is normal.      Breath sounds: Normal breath sounds.   Abdominal:      General: Abdomen is flat. Bowel sounds are normal. There is no distension.      Palpations: Abdomen is soft. There is no mass.      Tenderness: There is no abdominal tenderness. There is no guarding or rebound. Negative signs include Doan's sign.      Hernia: No hernia is present.   Musculoskeletal:      Cervical back: Neck supple.   Skin:     General: Skin is warm.      Capillary Refill: Capillary refill takes less than 2 seconds.   Neurological:      General: No focal deficit present.      Mental Status: She is alert and oriented to person, place, and time.   Psychiatric:         Mood and Affect: Mood normal.         Behavior: " Behavior normal.         Thought Content: Thought content normal.         Judgment: Judgment normal.         CLINICAL DATA REVIEWED   reviewed previous lab results and integrated with today's visit, reviewed notes from other physicians and/or last GI encounter, reviewed previous endoscopy results and available photos, reviewed surgical pathology results from previous biopsies    ASSESSMENT  Diagnoses and all orders for this visit:    Encounter for screening for malignant neoplasm of colon  -     Case Request; Standing  -     Case Request    Iron deficiency anemia due to chronic blood loss  -     Case Request; Standing  -     Case Request    Other orders  -     Follow Anesthesia Guidelines / Protocol; Future          PLAN    EGD and Colon  We reviewed risks and benefits of procedure  Continue iron    Return in about 3 months (around 8/9/2025).    I have discussed the above plan with the patient.  They verbalize understanding and are in agreement with the plan.  They have been advised to contact the office for any questions, concerns, or changes related to their health.

## 2025-05-09 NOTE — PROGRESS NOTES
May 9, 2025    Hello, may I speak with Julee King?    My name is YULY      I am  with [facility]  [address]  [place] [address] [phone].    Before we get started may I verify your date of birth? 1945    I am calling to officially welcome you to our practice and ask about your recent visit. Is this a good time to talk? yes    Tell me about your visit with us. What things went well?  YES SHE (STUART) WAS VERY NICE  WHOLD OFFICE WAS       We're always looking for ways to make our patients' experiences even better. Do you have recommendations on ways we may improve?  no    Overall were you satisfied with your first visit to our practice? yes       I appreciate you taking the time to speak with me today. Is there anything else I can do for you? no      Thank you, and have a great day.

## 2025-05-12 ENCOUNTER — HOSPITAL ENCOUNTER (OUTPATIENT)
Facility: HOSPITAL | Age: 80
Setting detail: HOSPITAL OUTPATIENT SURGERY
Discharge: HOME | End: 2025-05-12
Attending: STUDENT IN AN ORGANIZED HEALTH CARE EDUCATION/TRAINING PROGRAM | Admitting: STUDENT IN AN ORGANIZED HEALTH CARE EDUCATION/TRAINING PROGRAM
Payer: MEDICARE

## 2025-05-12 ENCOUNTER — ANESTHESIA EVENT (OUTPATIENT)
Dept: PERIOP | Facility: HOSPITAL | Age: 80
End: 2025-05-12
Payer: MEDICARE

## 2025-05-12 ENCOUNTER — ANESTHESIA (OUTPATIENT)
Dept: PERIOP | Facility: HOSPITAL | Age: 80
End: 2025-05-12
Payer: MEDICARE

## 2025-05-12 VITALS
TEMPERATURE: 98 F | RESPIRATION RATE: 14 BRPM | SYSTOLIC BLOOD PRESSURE: 105 MMHG | HEART RATE: 64 BPM | OXYGEN SATURATION: 95 % | DIASTOLIC BLOOD PRESSURE: 57 MMHG | WEIGHT: 261.2 LBS | BODY MASS INDEX: 51.01 KG/M2

## 2025-05-12 DIAGNOSIS — D50.0 IRON DEFICIENCY ANEMIA DUE TO CHRONIC BLOOD LOSS: ICD-10-CM

## 2025-05-12 DIAGNOSIS — Z12.11 ENCOUNTER FOR SCREENING FOR MALIGNANT NEOPLASM OF COLON: ICD-10-CM

## 2025-05-12 PROCEDURE — 25010000002 PHENYLEPHRINE 10 MG/ML SOLUTION

## 2025-05-12 PROCEDURE — 88341 IMHCHEM/IMCYTCHM EA ADD ANTB: CPT | Performed by: STUDENT IN AN ORGANIZED HEALTH CARE EDUCATION/TRAINING PROGRAM

## 2025-05-12 PROCEDURE — 88342 IMHCHEM/IMCYTCHM 1ST ANTB: CPT | Performed by: STUDENT IN AN ORGANIZED HEALTH CARE EDUCATION/TRAINING PROGRAM

## 2025-05-12 PROCEDURE — 88305 TISSUE EXAM BY PATHOLOGIST: CPT | Performed by: STUDENT IN AN ORGANIZED HEALTH CARE EDUCATION/TRAINING PROGRAM

## 2025-05-12 PROCEDURE — 25010000002 PROPOFOL 200 MG/20ML EMULSION

## 2025-05-12 PROCEDURE — 25010000002 LIDOCAINE 2% SOLUTION

## 2025-05-12 PROCEDURE — 25810000003 LACTATED RINGERS PER 1000 ML: Performed by: NURSE ANESTHETIST, CERTIFIED REGISTERED

## 2025-05-12 DEVICE — WORKING LENGTH 235CM, WORKING CHANNEL 2.8MM
Type: IMPLANTABLE DEVICE | Site: STOMACH | Status: FUNCTIONAL
Brand: RESOLUTION 360 CLIP

## 2025-05-12 RX ORDER — LIDOCAINE HYDROCHLORIDE 20 MG/ML
INJECTION, SOLUTION INFILTRATION; PERINEURAL AS NEEDED
Status: DISCONTINUED | OUTPATIENT
Start: 2025-05-12 | End: 2025-05-12 | Stop reason: SURG

## 2025-05-12 RX ORDER — SODIUM CHLORIDE 0.9 % (FLUSH) 0.9 %
10 SYRINGE (ML) INJECTION AS NEEDED
Status: DISCONTINUED | OUTPATIENT
Start: 2025-05-12 | End: 2025-05-12 | Stop reason: HOSPADM

## 2025-05-12 RX ORDER — PROPOFOL 10 MG/ML
INJECTION, EMULSION INTRAVENOUS AS NEEDED
Status: DISCONTINUED | OUTPATIENT
Start: 2025-05-12 | End: 2025-05-12 | Stop reason: SURG

## 2025-05-12 RX ORDER — SODIUM CHLORIDE, SODIUM LACTATE, POTASSIUM CHLORIDE, CALCIUM CHLORIDE 600; 310; 30; 20 MG/100ML; MG/100ML; MG/100ML; MG/100ML
9 INJECTION, SOLUTION INTRAVENOUS CONTINUOUS
Status: DISCONTINUED | OUTPATIENT
Start: 2025-05-12 | End: 2025-05-12 | Stop reason: HOSPADM

## 2025-05-12 RX ORDER — SODIUM CHLORIDE 9 MG/ML
40 INJECTION, SOLUTION INTRAVENOUS AS NEEDED
Status: DISCONTINUED | OUTPATIENT
Start: 2025-05-12 | End: 2025-05-12 | Stop reason: HOSPADM

## 2025-05-12 RX ORDER — ONDANSETRON 2 MG/ML
4 INJECTION INTRAMUSCULAR; INTRAVENOUS ONCE AS NEEDED
Status: DISCONTINUED | OUTPATIENT
Start: 2025-05-12 | End: 2025-05-12 | Stop reason: HOSPADM

## 2025-05-12 RX ORDER — SODIUM CHLORIDE, SODIUM LACTATE, POTASSIUM CHLORIDE, CALCIUM CHLORIDE 600; 310; 30; 20 MG/100ML; MG/100ML; MG/100ML; MG/100ML
100 INJECTION, SOLUTION INTRAVENOUS ONCE
Status: DISCONTINUED | OUTPATIENT
Start: 2025-05-12 | End: 2025-05-12 | Stop reason: HOSPADM

## 2025-05-12 RX ORDER — DEXMEDETOMIDINE HYDROCHLORIDE 100 UG/ML
INJECTION, SOLUTION INTRAVENOUS AS NEEDED
Status: DISCONTINUED | OUTPATIENT
Start: 2025-05-12 | End: 2025-05-12 | Stop reason: SURG

## 2025-05-12 RX ORDER — LIDOCAINE HYDROCHLORIDE 10 MG/ML
0.5 INJECTION, SOLUTION EPIDURAL; INFILTRATION; INTRACAUDAL; PERINEURAL ONCE AS NEEDED
Status: DISCONTINUED | OUTPATIENT
Start: 2025-05-12 | End: 2025-05-12 | Stop reason: HOSPADM

## 2025-05-12 RX ORDER — PHENYLEPHRINE HYDROCHLORIDE 10 MG/ML
INJECTION INTRAVENOUS AS NEEDED
Status: DISCONTINUED | OUTPATIENT
Start: 2025-05-12 | End: 2025-05-12 | Stop reason: SURG

## 2025-05-12 RX ORDER — EPHEDRINE SULFATE 50 MG/ML
INJECTION INTRAVENOUS AS NEEDED
Status: DISCONTINUED | OUTPATIENT
Start: 2025-05-12 | End: 2025-05-12 | Stop reason: SURG

## 2025-05-12 RX ORDER — SODIUM CHLORIDE 0.9 % (FLUSH) 0.9 %
10 SYRINGE (ML) INJECTION EVERY 12 HOURS SCHEDULED
Status: DISCONTINUED | OUTPATIENT
Start: 2025-05-12 | End: 2025-05-12 | Stop reason: HOSPADM

## 2025-05-12 RX ADMIN — DEXMEDETOMIDINE HYDROCHLORIDE 5 MCG: 100 INJECTION, SOLUTION INTRAVENOUS at 11:55

## 2025-05-12 RX ADMIN — LIDOCAINE HYDROCHLORIDE 60 MG: 20 INJECTION, SOLUTION INFILTRATION; PERINEURAL at 11:51

## 2025-05-12 RX ADMIN — PHENYLEPHRINE HYDROCHLORIDE 100 MCG: 10 INJECTION INTRAVENOUS at 12:15

## 2025-05-12 RX ADMIN — SODIUM CHLORIDE, POTASSIUM CHLORIDE, SODIUM LACTATE AND CALCIUM CHLORIDE 9 ML/HR: 600; 310; 30; 20 INJECTION, SOLUTION INTRAVENOUS at 10:40

## 2025-05-12 RX ADMIN — EPHEDRINE SULFATE 5 MG: 50 INJECTION INTRAVENOUS at 12:09

## 2025-05-12 RX ADMIN — PROPOFOL 310 MG: 10 INJECTION, EMULSION INTRAVENOUS at 11:51

## 2025-05-12 NOTE — H&P
Patient Care Team:  Pina Maxwell APRN as PCP - General (Family Medicine)  Anastasiia Callahan MD as Cardiologist (Cardiology)  Bianka Galaviz MD as Consulting Physician (Pulmonary Disease)  Alvarado Rivera MD as Consulting Physician (Gastroenterology)    CHIEF COMPLAINT:  Double for CHETNA.    HISTORY OF PRESENT ILLNESS:  Double for CHETNA.    Past Medical History:   Diagnosis Date    Bronchitis 05/2016    Cardiomyopathy     Carotid artery disease     16-49% stenosis of the left internal carotid 2016; carotid duplex 1/2017 - normal    Cataract 2015    Cholelithiasis 2015    Colon polyps     Coronary artery disease 01/2016    Non-STEMI and status post CABG    Diverticulosis     Dyslipidemia     GI bleed     Severely anemic and received a blood transfusion    Grade I diastolic dysfunction 02/08/2022    Per echocardiogram 2/2022    Headache     Hiatal hernia     Hyperlipidemia     Hypertension     Hypocalcemia     Hypokalemia     LVH (left ventricular hypertrophy) 02/08/2022    Mild to moderate per echo 2/2022    Non-STEMI (non-ST elevated myocardial infarction) 01/2016    Obesity     LOVE (obstructive sleep apnea)     moderate to severe; compliant with CPAP machine    PVC (premature ventricular contraction)      Past Surgical History:   Procedure Laterality Date    CARDIAC CATHETERIZATION N/A 02/11/2019    Procedure: Coronary angiography;  Surgeon: Cheryl Almeida MD;  [facility];  Service: Cardiovascular    CARDIAC CATHETERIZATION N/A 02/11/2019    Procedure: Left Heart Cath;  Surgeon: Cheryl Almeida MD;  Location:  [facility];  Service: Cardiovascular    CARDIAC CATHETERIZATION N/A 02/11/2019    Procedure: Left ventriculography;  Surgeon: Cheryl Almeida MD;  Location:  [facility] INV[facility];  Service: Cardiovascular    CARDIAC CATHETERIZATION  02/11/2019    Procedure: Saphenous Vein Graft;  Surgeon: Cheryl Almeida MD;  [facility];  Service: Cardiovascular    CARDIAC CATHETERIZATION N/A 02/11/2019    Procedure: Native mammary injection;  Surgeon: Cheryl Almeida MD;  Location:  [facility]ATH INVAS[facility] LOC[facility];  Service: Cardiovascular    CARDIAC SURGERY      CHOLECYSTECTOMY      COLONOSCOPY N/A 06/08/2016    Procedure: COLONOSCOPY to cecum with cold biopsy polypectomies;  Surgeon: Alvarado Rivera MD;  Location:  [facility] ENDOSCOPY;  Service:     CORONARY ARTERY BYPASS GRAFT  02/28/2016    CABG x4  Dr Monroy;  CHOI to LAD, SVG to obtuse marginal 1, SVG to obtuse marginal 2, and SVG to LV branch    ENDOSCOPY N/A 06/08/2016    Procedure: ESOPHAGOGASTRODUODENOSCOPY with biopsy;  Surgeon: Alvarado Rivera MD;  [facility];  Service:     ENTEROSCOPY SMALL BOWEL N/A 08/02/2017    Procedure: ENTEROSCOPY SMALL BOWEL WITH COLD BIOPSIES AND ARGON PLASMA COAGULATION TO  GASTIC ULCERS;  Surgeon: Alvarado Rivera MD;  [facility];  Service:     EYE SURGERY      2015    HERNIA REPAIR      JOINT REPLACEMENT      vijaya knees    OTHER SURGICAL HISTORY      KNEE REPLACEMENT    OTHER SURGICAL HISTORY      TREATMENT OF ANKLE FRACTURE    OTHER SURGICAL HISTORY      TREATMENT FRACTURE OF THE HAND     Family History   Problem Relation Age of Onset    Coronary artery disease Mother     Other Father     Coronary artery disease Father     Stroke Sister     Asthma Sister     Heart attack Sister     Heart attack Sister     Heart attack Brother     Asthma Sister     Stroke Sister     Heart attack Brother      Social History     Tobacco Use    Smoking status: Never     Passive exposure: Never    Smokeless tobacco: Never   Vaping Use    Vaping status: Never Used   Substance Use Topics    Alcohol use: Not Currently     Comment: WINE MAYBE EVERY COUPLE OF MONTHS    Drug use: Never     Medications Prior to Admission   Medication Sig Dispense Refill Last Dose/Taking    amLODIPine (NORVASC) 5 MG tablet TAKE 1 TABLET DAILY 90 tablet 3 5/12/2025 at   8:30 AM    carvedilol (COREG) 12.5 MG tablet TAKE 1 TABLET TWICE A DAY WITH MEALS 180 tablet 1 5/12/2025 at  8:30 AM    albuterol sulfate  (90 Base) MCG/ACT inhaler Inhale 2 puffs Every 4 (Four) Hours As Needed for Shortness of Air. 18 g 0 5/10/2025    aspirin 81 MG tablet Take 1 tablet by mouth Daily.   5/9/2025    atorvastatin (LIPITOR) 40 MG tablet Take 1 tablet by mouth Daily. 90 tablet 3 5/10/2025    ferrous sulfate 325 (65 FE) MG tablet Take 1 tablet by mouth Daily With Breakfast. 30 tablet 2 5/9/2025    furosemide (LASIX) 20 MG tablet Take 1 tablet by mouth Daily. 90 tablet 3 5/10/2025    losartan (COZAAR) 100 MG tablet TAKE 1 TABLET EVERY NIGHT 90 tablet 1 5/10/2025    NON FORMULARY C PAP        Allergies:  Codeine, Iodinated contrast media, Hctz [hydrochlorothiazide], and Sulfa antibiotics    REVIEW OF SYSTEMS:  Please see the above history of present illness for pertinent positives and negatives.  The remainder of the patient's systems have been reviewed and are negative.     Vital Signs  Temp:  [98.1 °F (36.7 °C)] 98.1 °F (36.7 °C)  Heart Rate:  [73] 73  Resp:  [18] 18  BP: (127)/(69) 127/69    Flowsheet Rows      Flowsheet Row First Filed Value   Admission Height --   Admission Weight 118 kg (261 lb 3.2 oz) Documented at 05/12/2025 1013             Physical Exam:  Physical Exam   Constitutional: Patient appears well-developed and well-nourished and in no acute distress   HEENT:   Head: Normocephalic and atraumatic.   Eyes:  Pupils are equal, round, and reactive to light. EOM are intact. Sclerae are anicteric and non-injected.  Mouth and Throat: Patient has moist mucous membranes. Oropharynx is clear of any erythema or exudate.     Neck: Neck supple. No JVD present. No thyromegaly present. No lymphadenopathy present.  Cardiovascular: Regular rate, regular rhythm, S1 normal and S2 normal.  Exam reveals no gallop and no friction rub.  No murmur heard.  Pulmonary/Chest: Lungs are clear to auscultation  bilaterally. No respiratory distress. No wheezes. No rhonchi. No rales.   Abdominal: Soft. Bowel sounds are normal. No distension and no mass. There is no hepatosplenomegaly. There is no tenderness.   Musculoskeletal: Normal Muscle tone  Extremities: No edema. Pulses are palpable in all 4 extremities.  Neurological: Patient is alert and oriented to person, place, and time. Cranial nerves II-XII are grossly intact with no focal deficits.  Skin: Skin is warm. No rash noted. Nails show no clubbing.  No cyanosis or erythema.    Debilities/Disabilities Identified: None  Emotional Behavior: Appropriate     Results Review:   I reviewed the patient's new clinical results.    Lab Results (most recent)       None            Imaging Results (Most Recent)       None          reviewed    ECG/EMG Results (most recent)       None          reviewed    Assessment & Plan   Double for CHETNA. /  EGD and colonoscopy      I discussed the patient's findings and my recommendations with patient.     Emanuel Royal MD  05/12/25  11:42 EDT    Time: 10 min prior to procedure.

## 2025-05-12 NOTE — ANESTHESIA PREPROCEDURE EVALUATION
Anesthesia Evaluation     Patient summary reviewed and Nursing notes reviewed   no history of anesthetic complications:   NPO Solid Status: > 8 hours  NPO Liquid Status: > 8 hours           Airway   Mallampati: II  TM distance: <3 FB  Neck ROM: full  No difficulty expected  Dental    (+) upper dentures    Pulmonary - normal exam    breath sounds clear to auscultation  (+) ,shortness of breath, sleep apnea on CPAP  Cardiovascular - normal exam  Exercise tolerance: good (4-7 METS)    ECG reviewed  Rhythm: regular  Rate: normal    (+) hypertension, past MI (2016)  >12 months, CAD, CABG (x4) >6 Months, HOBBS, hyperlipidemia  (-) carotid artery disease (normal study 2023)    ROS comment: EKG 4/15/25:  Comparison: compared with previous ECG from 9/10/2024  Comparison to previous ECG: Anteroseptal T wave inversion  Rhythm: sinus rhythm    ECHO 5/2/25:  Interpretation Summary  ·  Left ventricular systolic function is normal. Left ventricular ejection fraction appears to be 56 - 60%.  ·  Left ventricular wall thickness is consistent with mild concentric hypertrophy.  ·  Left ventricular diastolic function was normal.    STRESS TEST 2/7/22:  Interpretation Summary  ·Myocardial perfusion imaging indicates a small-to-medium-sized, mildly severe area of ischemia located in the inferior wall.  ·Left ventricular ejection fraction is normal. (Calculated EF = 60%).    Neuro/Psych  (+) headaches, syncope (related to BB. Decreased after.)  GI/Hepatic/Renal/Endo    (+) morbid obesity, hiatal hernia, GI bleeding     Musculoskeletal (-) negative ROS    Abdominal   (+) obese   Substance History - negative use     OB/GYN          Other - negative ROS       ROS/Med Hx Other: Cardiology visit 4/15/25  Anemia - 9.1                    Anesthesia Plan    ASA 3     MAC     intravenous induction     Anesthetic plan, risks, benefits, and alternatives have been provided, discussed and informed consent has been obtained with: patient.  Pre-procedure  education provided  Use of blood products discussed with patient  Consented to blood products.    Plan discussed with CRNA.        CODE STATUS:

## 2025-05-12 NOTE — ANESTHESIA POSTPROCEDURE EVALUATION
Patient: Julee King    Procedure Summary       Date: 05/12/25 Room / Location: [facility] ENDOSCOPY 2 /  [facility]    Anesthesia Start: 1145 Anesthesia Stop: 1220    Procedures:       ESOPHAGOGASTRODUODENOSCOPY with biopsies (Esophagus)      COLONOSCOPY WITH POLYPECTOMY Diagnosis:       Encounter for screening for malignant neoplasm of colon      Iron deficiency anemia due to chronic blood loss      (Encounter for screening for malignant neoplasm of colon [Z12.11])      (Iron deficiency anemia due to chronic blood loss [D50.0])    Surgeons: Emanuel Royal MD Provider: Aziza Simpson CRNA    Anesthesia Type: MAC ASA Status: 3            Anesthesia Type: MAC    Vitals  Vitals Value Taken Time   /54 05/12/25 12:30   Temp 98 °F (36.7 °C) 05/12/25 12:23   Pulse 66 05/12/25 12:45   Resp 16 05/12/25 12:30   SpO2 93 % 05/12/25 12:45   Vitals shown include unfiled device data.        Post Anesthesia Care and Evaluation    Patient location during evaluation: PHASE II  Patient participation: complete - patient participated  Level of consciousness: awake and alert  Pain score: 0  Pain management: adequate    Airway patency: patent  Anesthetic complications: No anesthetic complications  PONV Status: none  Cardiovascular status: acceptable  Respiratory status: acceptable  Hydration status: acceptable

## 2025-05-13 ENCOUNTER — OFFICE VISIT (OUTPATIENT)
Dept: CARDIOLOGY | Age: 80
End: 2025-05-13
Payer: MEDICARE

## 2025-05-13 VITALS
WEIGHT: 264 LBS | DIASTOLIC BLOOD PRESSURE: 80 MMHG | OXYGEN SATURATION: 97 % | BODY MASS INDEX: 51.56 KG/M2 | SYSTOLIC BLOOD PRESSURE: 115 MMHG | HEART RATE: 60 BPM

## 2025-05-13 DIAGNOSIS — D50.9 MICROCYTIC ANEMIA: ICD-10-CM

## 2025-05-13 DIAGNOSIS — E78.2 MIXED HYPERLIPIDEMIA: ICD-10-CM

## 2025-05-13 DIAGNOSIS — E66.813 CLASS 3 SEVERE OBESITY DUE TO EXCESS CALORIES WITH SERIOUS COMORBIDITY AND BODY MASS INDEX (BMI) OF 50.0 TO 59.9 IN ADULT: ICD-10-CM

## 2025-05-13 DIAGNOSIS — E66.01 CLASS 3 SEVERE OBESITY DUE TO EXCESS CALORIES WITH SERIOUS COMORBIDITY AND BODY MASS INDEX (BMI) OF 50.0 TO 59.9 IN ADULT: ICD-10-CM

## 2025-05-13 DIAGNOSIS — I10 ESSENTIAL HYPERTENSION: ICD-10-CM

## 2025-05-13 DIAGNOSIS — R06.09 DOE (DYSPNEA ON EXERTION): ICD-10-CM

## 2025-05-13 DIAGNOSIS — G47.33 OBSTRUCTIVE SLEEP APNEA: ICD-10-CM

## 2025-05-13 DIAGNOSIS — I25.10 CORONARY ARTERY DISEASE INVOLVING NATIVE CORONARY ARTERY OF NATIVE HEART WITHOUT ANGINA PECTORIS: Primary | ICD-10-CM

## 2025-05-13 RX ORDER — OMEPRAZOLE 20 MG/1
20 CAPSULE, DELAYED RELEASE ORAL DAILY
COMMUNITY

## 2025-05-13 NOTE — PROGRESS NOTES
Date of Office Visit: 2025  Encounter Provider: MANDIE Archer  Place of Service: [facility] CARDIOLOGY  Established cardiologist: Anastasiia Callahan MD  Patient Name: Julee iKng  :1945      Patient ID:  Julee King is a 80 y.o. female is here for  followup    With a pertinent medical history of CAD, s/p CABG. she has a history of hypertension, hyperlipidemia, morbid obesity, LOVE on CPAP ([employer]).      She was the caregiver for her  who  2023.  She now lives alone.  She stays active by doing quilting and activities with her friends.     History of Present Illness   presented with chest pain and pressure and was severely anemic.  She had a GI bleed.  Troponin was elevated.  Echo at this time showed EF reduced to 33% with mild to moderate mitral insufficiency.  LHC done showed 80% distal left main stenosis, 60% distal first OM stenosis, 30% mid ramus stenosis, 30% proximal LAD stenosis, 100% occluded first diagonal, 70% mid LAD stenosis, 100% proximal RCA stenosis, 95% distal RCA stenosis.  She went on to have CABG 2016.  She received LIMA to LAD, SVG to OM1, SVG to OM 2, SVG to LV.  Endoscopy 2016 showed small hiatal hernia, normal stomach and duodenal.     2019 presented with dyspnea on exertion D-dimer was mildly elevated VQ scan was negative for PE.  2D echo showed EF 58%, mild LVH, trace valvular regurgitation.  Cardiac PET study abnormal revealing medium sized mildly severe area of ischemia in the anterior and inferior lateral wall.     Catheterization 2019 with 80% distal left main, 50% proximal LAD with 100% mid LAD stenosis, 30% proximal circumflex with 50% mid circumflex stenosis patent high large first OM branch, 100% occluded RCA her grafts were patent except SVG to OM 2 and SVG to RCA were occluded.  Medical management was recommended and if this failed consider PCI to distal left main to help with  perfusion of distal left circumflex system.     Stress nuclear study 2022 small to moderate-sized area of mild ischemia of the inferior wall.  Echo at this time showed moderate concentric LVH and grade 1 diastolic dysfunction with an EF of 60% and RVSP of 22 mmHg.     In September she was admitted with syncope.  She was bradycardic on arrival but this quickly resolved and it was felt that she did not need a pacemaker but carvedilol was decreased.  She had a coughing spell prior to syncope and it was thought it could be related to this.  In addition she had a UTI.  A 2-week ZIO done after showed occasional PACs, short burst of atrial tachycardia, rare PVCs, no significant pauses, no bradycardia, average heart rate 70 bpm.     Echocardiogram 9/12/2023 with EF 56 to 60%, G1 DD, mild RV dilation with normal function, mild to moderate left atrial dilation, normal RVSP and no significant valvular abnormalities.  A carotid duplex study at this time with no evidence of stenosis.     She last saw Dr. Callahan in the office 9/10/2024.  She was stable and no changes were made.     3/28/2025 she was seen by primary care for shortness of breath.  Albuterol inhaler, methylprednisolone were prescribed.  PFTs and a chest x-ray were ordered.  Chest x-ray done 4/1/2025 showed no acute pulmonary process.  PFTs are pending.    I saw Julee in the office 4/15/2025.  She had a chronic shortness of breath for several years and over the last few months that it worsened.  It was occurring with exertion and rest.    Given history of 2 out of 4 occluded bypass grafts and her last cardiac catheterization recommending PCI to left main if medical management was not sufficient to control angina cardiac catheterization was arranged.  In workup for this she was found to be anemic which was new.  4/24/25 her hemoglobin had declined from a baseline of 11 to 13 to 9.0 with new microcytosis. Ferritin was low at 10.50.  Iron saturation was decreased  at 4%. She was prescribed oral iron supplementation.     Echocardiogram 5/2/2025 showed an ejection fraction of 56 to 60%, mild LVH, normal LV wall motion, normal LV diastolic function.  No significant valvular pathology.    5/12/2025 she underwent EGD and colonoscopy for this there was mild gastritis and a small hiatal hernia on her upper scope and C-scope showed normal ileum, 3 mm polyp in the descending colon which was removed and sent for pathology and medium sized lipoma in the transverse colon and mild diverticulosis in the sigmoid: There was no evidence of diverticular bleeding.    Today Julee presents for follow up.  She is feeling well.  She tells me her shortness of breath since I last saw her in the office has been stable she does not think there is been any worsening.  Her swelling is better on furosemide.  She is planning to go on an [employer] cruise in July.  Next week she has an appointment for pulmonary function testing she will also see primary care soon.  She has been on oral iron without any GI issues.  She is not sure when she will repeat labs for that.  She has absolutely no chest pain or pressure heaviness, tightness, neck pain or jaw pain or new back pains.  She has had no lightheadedness, dizziness, presyncope/syncope, heart racing, palpitations, nausea or reflux.  No sweating or flushing.     Current Outpatient Medications on File Prior to Visit   Medication Sig Dispense Refill    albuterol sulfate  (90 Base) MCG/ACT inhaler Inhale 2 puffs Every 4 (Four) Hours As Needed for Shortness of Air. 18 g 0    amLODIPine (NORVASC) 5 MG tablet TAKE 1 TABLET DAILY 90 tablet 3    aspirin 81 MG tablet Take 1 tablet by mouth Daily.      atorvastatin (LIPITOR) 40 MG tablet Take 1 tablet by mouth Daily. 90 tablet 3    carvedilol (COREG) 12.5 MG tablet TAKE 1 TABLET TWICE A DAY WITH MEALS 180 tablet 1    ferrous sulfate 325 (65 FE) MG tablet Take 1 tablet by mouth Daily With Breakfast. 30 tablet 2     furosemide (LASIX) 20 MG tablet Take 1 tablet by mouth Daily. 90 tablet 3    losartan (COZAAR) 100 MG tablet TAKE 1 TABLET EVERY NIGHT 90 tablet 1    NON FORMULARY C PAP      omeprazole (priLOSEC) 20 MG capsule Take 1 capsule by mouth Daily.       Current Facility-Administered Medications on File Prior to Visit   Medication Dose Route Frequency Provider Last Rate Last Admin    [DISCONTINUED] dexmedetomidine HCl (PRECEDEX) injection   Intravenous PRN Aziza Simpson CRNA   5 mcg at 05/12/25 1155    [DISCONTINUED] ePHEDrine Sulfate (Pressors)   Intravenous PRN Aziza Simpson, CRNA   5 mg at 05/12/25 1209    [DISCONTINUED] lactated ringers infusion  9 mL/hr Intravenous Continuous Rajat Longo CRNA   Stopped at 05/12/25 1250    [DISCONTINUED] lactated ringers infusion  100 mL/hr Intravenous Once Aziza Simpson CRNA        [DISCONTINUED] lidocaine (XYLOCAINE) 2% injection   Infiltration PRN Aziza Simpson CRNA   60 mg at 05/12/25 1151    [DISCONTINUED] lidocaine PF 1% (XYLOCAINE) injection 0.5 mL  0.5 mL Injection Once PRN Rajat Longo CRNA        [DISCONTINUED] ondansetron (ZOFRAN) injection 4 mg  4 mg Intravenous Once PRN Aziza Simpson CRNA        [DISCONTINUED] phenylephrine (KACI-SYNEPHRINE) injection   Intravenous PRN Aziza Simpson CRNA   100 mcg at 05/12/25 1215    [DISCONTINUED] Propofol (DIPRIVAN) injection   Intravenous PRN Aziza Simpson CRNA   310 mg at 05/12/25 1151    [DISCONTINUED] sodium chloride 0.9 % flush 10 mL  10 mL Intravenous Q12H Rajat Longo CRNA        [DISCONTINUED] sodium chloride 0.9 % flush 10 mL  10 mL Intravenous PRN Rajat Longo CRNA        [DISCONTINUED] sodium chloride 0.9 % infusion 40 mL  40 mL Intravenous PRN Rajat Longo CRNA        [DISCONTINUED] sterile water irrigation solution    PRN Emanuel Royal MD   200 mL at 05/12/25 1155         Procedures    ECG 12 Lead    Date/Time: 5/13/2025 10:45 AM  Performed  by: Nelli Kitchen APRN    Authorized by: Nelli Kitchen APRN  Comparison: compared with previous ECG from 9/10/2024  Comparison to previous ECG: There is T wave inversion in the anteroseptal leads. Reviewed with Dr. Callahan today- overall stable ECG with mild abnormality present.              Objective:      Vitals:    05/13/25 1035   BP: 115/80   Pulse: 60   SpO2: 97%   Weight: 120 kg (264 lb)     Body mass index is 51.56 kg/m².  Wt Readings from Last 3 Encounters:   05/13/25 120 kg (264 lb)   05/12/25 118 kg (261 lb 3.2 oz)   05/09/25 120 kg (263 lb 9.6 oz)       Constitutional:       Comments: Julee is 80-year-old  female who is morbidly obese, in no acute distress   Eyes:      Pupils: Pupils are equal, round, and reactive to light.   Neck:      Comments: Increased neck circumference limiting exam, no appreciable JVD  Pulmonary:      Effort: Pulmonary effort is normal.      Comments: Bilateral lung sounds are diminished in the lower lobes  Cardiovascular:      Normal rate. Regular rhythm.      Murmurs: There is no murmur.   Pulses:     Radial: 2+ bilaterally.     Dorsalis pedis: 2+ bilaterally.     Posterior tibial: 2+ bilaterally.  Edema:     Pretibial: No edema of the bilateral pretibial area.     Ankle: bilateral 2+ edema of the ankle.     Feet: bilateral 1+ edema of the feet.  Abdominal:      General: Bowel sounds are normal.      Palpations: Abdomen is soft.   Musculoskeletal:      Cervical back: Normal range of motion. Skin:     General: Skin is warm and dry.   Neurological: 7     Mental Status: Alert and oriented to person, place and time.   Psychiatric:         Speech: Speech normal.     Assessment:     1. Coronary artery disease involving native coronary artery of native heart without angina pectoris    2. HOBBS (dyspnea on exertion)    3. Essential hypertension    4. Mixed hyperlipidemia    5. Class 3 severe obesity due to excess calories with serious comorbidity and body mass  index (BMI) of 50.0 to 59.9 in adult    6. Obstructive sleep apnea    7. Microcytic anemia      CAD, s/p CABG 1/2016, has occluded SVG to RCA and occluded SVG to OM1 to, treated medically.  Stress nuclear perfusion study done 2/2022 showed inferior wall ischemia, she had no unstable symptoms and has been treated medically.  If she develops anginal type chest pain, she could theoretically have a left main intervention to improve perfusion to the left circumflex system.  History of GI bleeding 1/2016  Hypertension, well-controlled  Hyperlipidemia, on atorvastatin  LOVE on CPAP  Obesity.  Iron deficiency anemia now on oral iron    Plan:   No medication changes were made  Repeat cbc and iron per PCP after 1 to 2 months on her oral iron therapy.  Plan for LHC if exertional dyspnea worsens.  At this time she is stable she is having pulmonary evaluation next week.  Also consider other etiologies such as obesity, deconditioning.   She is scheduled to see me in September we will keep this as is.     I have set a reminder to follow-up after upcoming primary care appointment and pulmonology appointment to review ----in July she is planning to go on a cruise.       Thank you for allowing me to participate in this patient's care. Please call with any questions or concerns.        >40 minutes spent in pt care   Dragon dictation device was utilized in this note.

## 2025-05-14 LAB
CYTO UR: NORMAL
LAB AP CASE REPORT: NORMAL
PATH REPORT.FINAL DX SPEC: NORMAL
PATH REPORT.GROSS SPEC: NORMAL

## 2025-05-21 ENCOUNTER — HOSPITAL ENCOUNTER (OUTPATIENT)
Dept: RESPIRATORY THERAPY | Facility: HOSPITAL | Age: 80
Discharge: HOME | End: 2025-05-21
Payer: MEDICARE

## 2025-05-21 DIAGNOSIS — R93.89 ABNORMAL CHEST X-RAY: ICD-10-CM

## 2025-05-21 DIAGNOSIS — R06.02 SHORTNESS OF BREATH: ICD-10-CM

## 2025-05-21 LAB
BDY SITE: ABNORMAL
HGB BLDA-MCNC: 9.9 G/DL (ref 12–18)

## 2025-05-21 PROCEDURE — 94726 PLETHYSMOGRAPHY LUNG VOLUMES: CPT

## 2025-05-21 PROCEDURE — 82820 HEMOGLOBIN-OXYGEN AFFINITY: CPT

## 2025-05-21 PROCEDURE — 94729 DIFFUSING CAPACITY: CPT

## 2025-05-21 PROCEDURE — 94060 EVALUATION OF WHEEZING: CPT

## 2025-05-21 RX ORDER — ALBUTEROL SULFATE 0.83 MG/ML
2.5 SOLUTION RESPIRATORY (INHALATION) ONCE AS NEEDED
Status: COMPLETED | OUTPATIENT
Start: 2025-05-21 | End: 2025-05-21

## 2025-05-21 RX ADMIN — ALBUTEROL SULFATE 2.5 MG: 2.5 SOLUTION RESPIRATORY (INHALATION) at 15:19

## 2025-05-23 RX ORDER — AMLODIPINE BESYLATE 5 MG/1
5 TABLET ORAL DAILY
Qty: 90 TABLET | Refills: 3 | Status: SHIPPED | OUTPATIENT
Start: 2025-05-23

## 2025-05-27 ENCOUNTER — OFFICE VISIT (OUTPATIENT)
Dept: FAMILY MEDICINE CLINIC | Facility: CLINIC | Age: 80
End: 2025-05-27
Payer: MEDICARE

## 2025-05-27 ENCOUNTER — RESULTS FOLLOW-UP (OUTPATIENT)
Dept: FAMILY MEDICINE CLINIC | Facility: CLINIC | Age: 80
End: 2025-05-27

## 2025-05-27 VITALS
TEMPERATURE: 98 F | HEIGHT: 60 IN | SYSTOLIC BLOOD PRESSURE: 110 MMHG | DIASTOLIC BLOOD PRESSURE: 66 MMHG | OXYGEN SATURATION: 98 % | BODY MASS INDEX: 52.38 KG/M2 | WEIGHT: 266.8 LBS | HEART RATE: 68 BPM

## 2025-05-27 DIAGNOSIS — Z09 FOLLOW-UP EXAM: Primary | ICD-10-CM

## 2025-05-27 DIAGNOSIS — Z13.29 SCREENING FOR THYROID DISORDER: ICD-10-CM

## 2025-05-27 DIAGNOSIS — K29.70 GASTRITIS, PRESENCE OF BLEEDING UNSPECIFIED, UNSPECIFIED CHRONICITY, UNSPECIFIED GASTRITIS TYPE: ICD-10-CM

## 2025-05-27 DIAGNOSIS — E78.5 DYSLIPIDEMIA: ICD-10-CM

## 2025-05-27 DIAGNOSIS — I10 ESSENTIAL HYPERTENSION: ICD-10-CM

## 2025-05-27 DIAGNOSIS — R73.03 PREDIABETES: ICD-10-CM

## 2025-05-27 DIAGNOSIS — D50.9 MICROCYTIC ANEMIA: ICD-10-CM

## 2025-05-27 DIAGNOSIS — R25.2 MUSCLE CRAMPS: ICD-10-CM

## 2025-05-27 DIAGNOSIS — R06.02 SHORTNESS OF BREATH: ICD-10-CM

## 2025-05-27 DIAGNOSIS — R53.1 WEAKNESS: ICD-10-CM

## 2025-05-27 PROCEDURE — 1125F AMNT PAIN NOTED PAIN PRSNT: CPT

## 2025-05-27 PROCEDURE — G2211 COMPLEX E/M VISIT ADD ON: HCPCS

## 2025-05-27 PROCEDURE — 1159F MED LIST DOCD IN RCRD: CPT

## 2025-05-27 PROCEDURE — 3074F SYST BP LT 130 MM HG: CPT

## 2025-05-27 PROCEDURE — 99214 OFFICE O/P EST MOD 30 MIN: CPT

## 2025-05-27 PROCEDURE — 1160F RVW MEDS BY RX/DR IN RCRD: CPT

## 2025-05-27 PROCEDURE — 3078F DIAST BP <80 MM HG: CPT

## 2025-05-27 RX ORDER — OMEPRAZOLE 40 MG/1
40 CAPSULE, DELAYED RELEASE ORAL DAILY
Qty: 90 CAPSULE | Refills: 0 | Status: SHIPPED | OUTPATIENT
Start: 2025-05-27

## 2025-05-27 RX ORDER — ALBUTEROL SULFATE 90 UG/1
2 INHALANT RESPIRATORY (INHALATION) EVERY 4 HOURS PRN
Qty: 18 G | Refills: 2 | Status: SHIPPED | OUTPATIENT
Start: 2025-05-27

## 2025-05-27 RX ORDER — BACLOFEN 10 MG/1
10 TABLET ORAL NIGHTLY PRN
Qty: 30 TABLET | Refills: 0 | Status: SHIPPED | OUTPATIENT
Start: 2025-05-27

## 2025-05-27 NOTE — PROGRESS NOTES
"Chief Complaint  Anemia (Follow up/Still experiencing some weakness/)    Subjective        Julee King presents to [facility] PRIMARY [facility]    History of Present Illness  80 year old female presents for anemia follow up. She was recently placed on daily iron supplement for deficiency. She continues to experience some weakness but has improved. Denies abdominal pain and constipation with iron intake. Hypertension is well-controlled at 110/66. Denies chest pain. She is experiencing lower extremity cramping at night. Encouraged stretching before bed, warm compresses and warm showers with streams directed towards extremities, increase daily exercise. She is currently taking atorvastatin for lipid control.  Previous hemoglobin A1c in prediabetic range at 6.10%. Due for thyroid screening. We discussed her pulmonary function test results in clinic today. Continues to use albuterol as needed. She had colonoscopy 5/12/25 which recommended 2-3 month course of Omeprazole 40mg to treat mild gastritis. Pt would like supply sent to pharmacy. She is preparing to go on [employer]. Educated she may receive COVID and RSV vaccinations at her pharmacy.       Objective   Vital Signs:  /66 (BP Location: Left arm, Patient Position: Sitting, Cuff Size: Large Adult)   Pulse 68   Temp 98 °F (36.7 °C)   Ht 152.4 cm (60\")   Wt 121 kg (266 lb 12.8 oz)   SpO2 98%   BMI 52.11 kg/m²   Estimated body mass index is 52.11 kg/m² as calculated from the following:    Height as of this encounter: 152.4 cm (60\").    Weight as of this encounter: 121 kg (266 lb 12.8 oz).        Physical Exam  Constitutional:       Appearance: Normal appearance.   HENT:      Head: Normocephalic.   Eyes:      Conjunctiva/sclera: Conjunctivae normal.      Pupils: Pupils are equal, round, and reactive to light.   Cardiovascular:      Rate and Rhythm: Normal rate and regular rhythm.      Pulses: Normal pulses.      Heart sounds: Normal " heart sounds.   Pulmonary:      Effort: Pulmonary effort is normal.      Breath sounds: Normal breath sounds.   Skin:     General: Skin is warm.   Neurological:      General: No focal deficit present.      Mental Status: She is alert and oriented to person, place, and time.   Psychiatric:         Mood and Affect: Mood normal.         Behavior: Behavior normal.        Result Review :                Assessment and Plan   Diagnoses and all orders for this visit:    1. Follow-up exam (Primary)    2. Microcytic anemia  -     CBC w AUTO Differential  -     Comprehensive metabolic panel  -     Iron and TIBC  -     Ferritin    3. Weakness  -     CBC w AUTO Differential  -     Comprehensive metabolic panel  -     Vitamin B12  -     TSH  -     CK    4. Essential hypertension  -     CBC w AUTO Differential  -     Comprehensive metabolic panel    5. Prediabetes  -     Hemoglobin A1c    6. Dyslipidemia  -     CBC w AUTO Differential  -     Comprehensive metabolic panel  -     Lipid panel    7. Screening for thyroid disorder  -     TSH    8. Shortness of breath  -     albuterol sulfate  (90 Base) MCG/ACT inhaler; Inhale 2 puffs Every 4 (Four) Hours As Needed for Shortness of Air.  Dispense: 18 g; Refill: 2    9. Muscle cramps  -     CBC w AUTO Differential  -     Comprehensive metabolic panel  -     CK  -     baclofen (LIORESAL) 10 MG tablet; Take 1 tablet by mouth At Night As Needed for Muscle Spasms. Do not drive or operate machinery after use.  Dispense: 30 tablet; Refill: 0    10. Gastritis, presence of bleeding unspecified, unspecified chronicity, unspecified gastritis type  -     omeprazole (priLOSEC) 40 MG capsule; Take 1 capsule by mouth Daily.  Dispense: 90 capsule; Refill: 0             Follow Up   Return in about 6 months (around 11/27/2025) for Medicare Wellness.  Patient was given instructions and counseling regarding her condition or for health maintenance advice. Please see specific information pulled into  the AVS if appropriate.

## 2025-05-28 LAB
ALBUMIN SERPL-MCNC: 3.7 G/DL (ref 3.8–4.8)
ALP SERPL-CCNC: 141 IU/L (ref 44–121)
ALT SERPL-CCNC: 14 IU/L (ref 0–32)
AST SERPL-CCNC: 16 IU/L (ref 0–40)
BASOPHILS # BLD AUTO: 0 X10E3/UL (ref 0–0.2)
BASOPHILS NFR BLD AUTO: 1 %
BILIRUB SERPL-MCNC: 0.5 MG/DL (ref 0–1.2)
BUN SERPL-MCNC: 19 MG/DL (ref 8–27)
BUN/CREAT SERPL: 26 (ref 12–28)
CALCIUM SERPL-MCNC: 8.8 MG/DL (ref 8.7–10.3)
CHLORIDE SERPL-SCNC: 110 MMOL/L (ref 96–106)
CHOLEST SERPL-MCNC: 116 MG/DL (ref 100–199)
CK SERPL-CCNC: 36 U/L (ref 32–182)
CO2 SERPL-SCNC: 22 MMOL/L (ref 20–29)
CREAT SERPL-MCNC: 0.72 MG/DL (ref 0.57–1)
EGFRCR SERPLBLD CKD-EPI 2021: 85 ML/MIN/1.73
EOSINOPHIL # BLD AUTO: 0.2 X10E3/UL (ref 0–0.4)
EOSINOPHIL NFR BLD AUTO: 3 %
ERYTHROCYTE [DISTWIDTH] IN BLOOD BY AUTOMATED COUNT: 26.8 % (ref 11.7–15.4)
FERRITIN SERPL-MCNC: 36 NG/ML (ref 15–150)
GLOBULIN SER CALC-MCNC: 2.9 G/DL (ref 1.5–4.5)
GLUCOSE SERPL-MCNC: 116 MG/DL (ref 70–99)
HBA1C MFR BLD: 5.7 % (ref 4.8–5.6)
HCT VFR BLD AUTO: 39.9 % (ref 34–46.6)
HDLC SERPL-MCNC: 36 MG/DL
HGB BLD-MCNC: 10.9 G/DL (ref 11.1–15.9)
IMM GRANULOCYTES # BLD AUTO: 0 X10E3/UL (ref 0–0.1)
IMM GRANULOCYTES NFR BLD AUTO: 0 %
IRON SATN MFR SERPL: 73 % (ref 15–55)
IRON SERPL-MCNC: 247 UG/DL (ref 27–139)
LDLC SERPL CALC-MCNC: 64 MG/DL (ref 0–99)
LYMPHOCYTES # BLD AUTO: 1.1 X10E3/UL (ref 0.7–3.1)
LYMPHOCYTES NFR BLD AUTO: 17 %
MCH RBC QN AUTO: 19.9 PG (ref 26.6–33)
MCHC RBC AUTO-ENTMCNC: 27.3 G/DL (ref 31.5–35.7)
MCV RBC AUTO: 73 FL (ref 79–97)
MONOCYTES # BLD AUTO: 0.6 X10E3/UL (ref 0.1–0.9)
MONOCYTES NFR BLD AUTO: 9 %
MORPHOLOGY BLD-IMP: ABNORMAL
NEUTROPHILS # BLD AUTO: 4.5 X10E3/UL (ref 1.4–7)
NEUTROPHILS NFR BLD AUTO: 70 %
PLATELET # BLD AUTO: 214 X10E3/UL (ref 150–450)
POTASSIUM SERPL-SCNC: 4.7 MMOL/L (ref 3.5–5.2)
PROT SERPL-MCNC: 6.6 G/DL (ref 6–8.5)
RBC # BLD AUTO: 5.47 X10E6/UL (ref 3.77–5.28)
SODIUM SERPL-SCNC: 146 MMOL/L (ref 134–144)
TIBC SERPL-MCNC: 338 UG/DL (ref 250–450)
TRIGL SERPL-MCNC: 79 MG/DL (ref 0–149)
TSH SERPL DL<=0.005 MIU/L-ACNC: 1.16 UIU/ML (ref 0.45–4.5)
UIBC SERPL-MCNC: 91 UG/DL (ref 118–369)
VIT B12 SERPL-MCNC: 178 PG/ML (ref 232–1245)
VLDLC SERPL CALC-MCNC: 16 MG/DL (ref 5–40)
WBC # BLD AUTO: 6.4 X10E3/UL (ref 3.4–10.8)

## 2025-05-29 ENCOUNTER — RESULTS FOLLOW-UP (OUTPATIENT)
Dept: FAMILY MEDICINE CLINIC | Facility: CLINIC | Age: 80
End: 2025-05-29

## 2025-05-29 DIAGNOSIS — D50.9 MICROCYTIC ANEMIA: ICD-10-CM

## 2025-05-29 DIAGNOSIS — E87.0 HYPERNATREMIA: ICD-10-CM

## 2025-05-29 DIAGNOSIS — E53.8 VITAMIN B12 DEFICIENCY: Primary | ICD-10-CM

## 2025-07-16 ENCOUNTER — OFFICE VISIT (OUTPATIENT)
Dept: GASTROENTEROLOGY | Facility: CLINIC | Age: 80
End: 2025-07-16
Payer: MEDICARE

## 2025-07-16 VITALS
HEIGHT: 60 IN | SYSTOLIC BLOOD PRESSURE: 112 MMHG | BODY MASS INDEX: 52.38 KG/M2 | WEIGHT: 266.8 LBS | DIASTOLIC BLOOD PRESSURE: 72 MMHG

## 2025-07-16 DIAGNOSIS — D50.0 IRON DEFICIENCY ANEMIA DUE TO CHRONIC BLOOD LOSS: Primary | ICD-10-CM

## 2025-07-16 PROCEDURE — 1160F RVW MEDS BY RX/DR IN RCRD: CPT | Performed by: STUDENT IN AN ORGANIZED HEALTH CARE EDUCATION/TRAINING PROGRAM

## 2025-07-16 PROCEDURE — 99213 OFFICE O/P EST LOW 20 MIN: CPT | Performed by: STUDENT IN AN ORGANIZED HEALTH CARE EDUCATION/TRAINING PROGRAM

## 2025-07-16 PROCEDURE — 1159F MED LIST DOCD IN RCRD: CPT | Performed by: STUDENT IN AN ORGANIZED HEALTH CARE EDUCATION/TRAINING PROGRAM

## 2025-07-16 PROCEDURE — 3074F SYST BP LT 130 MM HG: CPT | Performed by: STUDENT IN AN ORGANIZED HEALTH CARE EDUCATION/TRAINING PROGRAM

## 2025-07-16 PROCEDURE — 3078F DIAST BP <80 MM HG: CPT | Performed by: STUDENT IN AN ORGANIZED HEALTH CARE EDUCATION/TRAINING PROGRAM

## 2025-07-16 NOTE — PROGRESS NOTES
Julee King is a 80 y.o. female with PMH of CAD s/p CABG, Carotid Artery Stenosis, CHETNA, LOVE + noted below who presents with   Chief Complaint   Patient presents with    Colon Polyps    Hiatal Hernia    Gastritis        Subjective     # CHETNA, resolving    - Adherent to Ferrous Sulfate 325 mg QD.  - Started on Omeprazole 40 mg daily before breakfast for 2 to 3 months following the EGD.  - EGD in 5/2025 had small HH, mild gastritis (biopsied -- negative for H Pylori, had focal intestinal metaplasia which is a benign finding).   - C/s in 5/2025 had a sub-cm TA removed. Instructed to repeat c/s in 5/2032 if patient wishes to continue surveillance colonoscopy given her age.   - Noted iron studies this month were essentially unchanged from 5/2025 -- ferritin 35 & T Sat 9%.  - Noted her Hgb yesterday has improved to 12.2 from 10.9 in 5/2025.           Past Medical History:   Diagnosis Date    Bronchitis 05/2016    Cardiomyopathy     Carotid artery disease     16-49% stenosis of the left internal carotid 2016; carotid duplex 1/2017 - normal    Cataract 2015    Cholelithiasis 2015    Colon polyps     Coronary artery disease 01/2016    Non-STEMI and status post CABG    Diverticulosis     Dyslipidemia     GI bleed     Severely anemic and received a blood transfusion    Grade I diastolic dysfunction 02/08/2022    Per echocardiogram 2/2022    Headache     Hiatal hernia     Hyperlipidemia     Hypertension     Hypocalcemia     Hypokalemia     LVH (left ventricular hypertrophy) 02/08/2022    Mild to moderate per echo 2/2022    Non-STEMI (non-ST elevated myocardial infarction) 01/2016    Obesity     LOVE (obstructive sleep apnea)     moderate to severe; compliant with CPAP machine    PVC (premature ventricular contraction)        Social History     Socioeconomic History    Marital status:    Tobacco Use    Smoking status: Never     Passive exposure: Never    Smokeless tobacco: Never   Vaping Use    Vaping status: Never  Used   Substance and Sexual Activity    Alcohol use: Not Currently     Comment: WINE MAYBE EVERY COUPLE OF MONTHS    Drug use: Never    Sexual activity: Not Currently     Partners: Male     Birth control/protection: None         Current Outpatient Medications:     albuterol sulfate  (90 Base) MCG/ACT inhaler, Inhale 2 puffs Every 4 (Four) Hours As Needed for Shortness of Air., Disp: 18 g, Rfl: 2    amLODIPine (NORVASC) 5 MG tablet, TAKE 1 TABLET DAILY, Disp: 90 tablet, Rfl: 3    aspirin 81 MG tablet, Take 1 tablet by mouth Daily., Disp: , Rfl:     atorvastatin (LIPITOR) 40 MG tablet, Take 1 tablet by mouth Daily., Disp: 90 tablet, Rfl: 3    baclofen (LIORESAL) 10 MG tablet, Take 1 tablet by mouth At Night As Needed for Muscle Spasms. Do not drive or operate machinery after use., Disp: 30 tablet, Rfl: 0    carvedilol (COREG) 12.5 MG tablet, TAKE 1 TABLET TWICE A DAY WITH MEALS, Disp: 180 tablet, Rfl: 1    ferrous sulfate 325 (65 FE) MG tablet, Take 1 tablet by mouth Daily With Breakfast., Disp: 30 tablet, Rfl: 2    furosemide (LASIX) 20 MG tablet, Take 1 tablet by mouth Daily., Disp: 90 tablet, Rfl: 3    losartan (COZAAR) 100 MG tablet, TAKE 1 TABLET EVERY NIGHT, Disp: 90 tablet, Rfl: 1    NON FORMULARY, C PAP, Disp: , Rfl:     omeprazole (priLOSEC) 40 MG capsule, Take 1 capsule by mouth Daily., Disp: 90 capsule, Rfl: 0    Objective   Vitals:    07/16/25 1357   BP: 112/72         07/16/25  1357   Weight: 121 kg (266 lb 12.8 oz)     Body mass index is 52.11 kg/m².      Physical Exam  Vitals reviewed.   Constitutional:       Appearance: Normal appearance.   HENT:      Head: Normocephalic and atraumatic.   Eyes:      Extraocular Movements: Extraocular movements intact.      Conjunctiva/sclera: Conjunctivae normal.   Cardiovascular:      Rate and Rhythm: Normal rate and regular rhythm.      Heart sounds: Normal heart sounds.   Pulmonary:      Effort: Pulmonary effort is normal.      Breath sounds: Normal breath  sounds.   Abdominal:      General: Abdomen is flat. Bowel sounds are normal. There is no distension.      Palpations: Abdomen is soft.      Tenderness: There is no abdominal tenderness.   Neurological:      Mental Status: She is alert.   Psychiatric:         Mood and Affect: Mood normal.         Behavior: Behavior normal.         WBC   Date Value Ref Range Status   07/15/2025 6.89 3.40 - 10.80 10*3/mm3 Final     RBC   Date Value Ref Range Status   07/15/2025 5.30 (H) 3.77 - 5.28 10*6/mm3 Final     Hemoglobin   Date Value Ref Range Status   07/15/2025 12.2 12.0 - 15.9 g/dL Final   04/24/2025 9.0 (L) 12.0 - 15.9 g/dL Final     Hematocrit   Date Value Ref Range Status   07/15/2025 41.6 34.0 - 46.6 % Final   04/24/2025 32.2 (L) 34.0 - 46.6 % Final     MCV   Date Value Ref Range Status   07/15/2025 78.5 (L) 79.0 - 97.0 fL Final   04/24/2025 63.0 (L) 79.0 - 97.0 fL Final     MCH   Date Value Ref Range Status   07/15/2025 23.0 (L) 26.6 - 33.0 pg Final   04/24/2025 17.6 (L) 26.6 - 33.0 pg Final     MCHC   Date Value Ref Range Status   07/15/2025 29.3 (L) 31.5 - 35.7 g/dL Final   04/24/2025 28.0 (L) 31.5 - 35.7 g/dL Final     RDW   Date Value Ref Range Status   07/15/2025 23.0 (H) 12.3 - 15.4 % Final   04/24/2025 19.2 (H) 12.3 - 15.4 % Final     RDW-SD   Date Value Ref Range Status   04/24/2025 41.7 37.0 - 54.0 fl Final     MPV   Date Value Ref Range Status   09/12/2023 11.2 6.0 - 12.0 fL Final     Platelets   Date Value Ref Range Status   07/15/2025 184 140 - 450 10*3/mm3 Final   04/24/2025 266 140 - 450 10*3/mm3 Final     Neutrophil Rel %   Date Value Ref Range Status   07/15/2025 70.1 42.7 - 76.0 % Final     Neutrophil %   Date Value Ref Range Status   09/12/2023 67.3 42.7 - 76.0 % Final     Lymphocyte Rel %   Date Value Ref Range Status   07/15/2025 14.2 (L) 19.6 - 45.3 % Final     Lymphocyte %   Date Value Ref Range Status   09/12/2023 20.3 19.6 - 45.3 % Final     Monocyte Rel %   Date Value Ref Range Status    07/15/2025 11.6 5.0 - 12.0 % Final     Monocyte %   Date Value Ref Range Status   09/12/2023 10.1 5.0 - 12.0 % Final     Eosinophil Rel %   Date Value Ref Range Status   07/15/2025 3.2 0.3 - 6.2 % Final     Eosinophil %   Date Value Ref Range Status   09/12/2023 1.9 0.3 - 6.2 % Final     Basophil Rel %   Date Value Ref Range Status   07/15/2025 0.6 0.0 - 1.5 % Final     Basophil %   Date Value Ref Range Status   09/12/2023 0.2 0.0 - 1.5 % Final     Immature Grans %   Date Value Ref Range Status   09/12/2023 0.2 0.0 - 0.5 % Final     Neutrophils Absolute   Date Value Ref Range Status   07/15/2025 4.83 1.70 - 7.00 10*3/mm3 Final   04/24/2025 5.06 1.70 - 7.00 10*3/mm3 Final     Neutrophils, Absolute   Date Value Ref Range Status   09/12/2023 4.19 1.70 - 7.00 10*3/mm3 Final     Lymphocytes Absolute   Date Value Ref Range Status   07/15/2025 0.98 0.70 - 3.10 10*3/mm3 Final     Lymphocytes, Absolute   Date Value Ref Range Status   09/12/2023 1.26 0.70 - 3.10 10*3/mm3 Final     Monocytes Absolute   Date Value Ref Range Status   07/15/2025 0.80 0.10 - 0.90 10*3/mm3 Final     Monocytes, Absolute   Date Value Ref Range Status   09/12/2023 0.63 0.10 - 0.90 10*3/mm3 Final     Eosinophils Absolute   Date Value Ref Range Status   07/15/2025 0.22 0.00 - 0.40 10*3/mm3 Final   04/24/2025 0.07 0.00 - 0.40 10*3/mm3 Final     Eosinophils, Absolute   Date Value Ref Range Status   09/12/2023 0.12 0.00 - 0.40 10*3/mm3 Final     Basophils Absolute   Date Value Ref Range Status   07/15/2025 0.04 0.00 - 0.20 10*3/mm3 Final     Basophils, Absolute   Date Value Ref Range Status   09/12/2023 0.01 0.00 - 0.20 10*3/mm3 Final     Immature Grans, Absolute   Date Value Ref Range Status   09/12/2023 0.01 0.00 - 0.05 10*3/mm3 Final     nRBC   Date Value Ref Range Status   09/17/2024 0.0 0.0 - 0.2 /100 WBC Final   09/12/2023 0.0 0.0 - 0.2 /100 WBC Final       Lab Results   Component Value Date    GLUCOSE 124 (H) 07/15/2025    BUN 17.0 07/15/2025     CREATININE 0.93 07/15/2025    EGFRIFNONA 79 02/23/2022    EGFRIFAFRI  09/19/2016      Comment:      <15 Indicative of kidney failure.    BCR 18.3 07/15/2025    CO2 23.6 07/15/2025    CALCIUM 8.7 07/15/2025    ALBUMIN 4.0 07/15/2025    AST 16 07/15/2025    ALT 13 07/15/2025         Imaging Results (Last 7 Days)       ** No results found for the last 168 hours. **              Assessment & Plan   Diagnoses and all orders for this visit:    1. Iron deficiency anemia due to chronic blood loss (Primary)  Assessment & Plan:  - Resolving. Most recent Hgb is normal  - Continue ferrous sulfate 325 mg QD   - If anemia worsen, will plan to schedule capsule endoscopy to further evaluate         F/u in clinic as needed     I have discussed the above plan with the patient.  They verbalize understanding and are in agreement with the plan.  They have been advised to contact the office for any questions, concerns, or changes related to their health.

## 2025-07-16 NOTE — ASSESSMENT & PLAN NOTE
- Resolving. Most recent Hgb is normal  - Continue ferrous sulfate 325 mg QD   - If anemia worsen, will plan to schedule capsule endoscopy to further evaluate

## 2025-07-17 ENCOUNTER — RESULTS FOLLOW-UP (OUTPATIENT)
Dept: FAMILY MEDICINE CLINIC | Facility: CLINIC | Age: 80
End: 2025-07-17
Payer: MEDICARE

## 2025-07-21 ENCOUNTER — OFFICE VISIT (OUTPATIENT)
Dept: FAMILY MEDICINE CLINIC | Facility: CLINIC | Age: 80
End: 2025-07-21
Payer: MEDICARE

## 2025-07-21 VITALS
WEIGHT: 267.4 LBS | BODY MASS INDEX: 52.5 KG/M2 | SYSTOLIC BLOOD PRESSURE: 120 MMHG | HEIGHT: 60 IN | TEMPERATURE: 97.5 F | DIASTOLIC BLOOD PRESSURE: 70 MMHG | OXYGEN SATURATION: 96 % | HEART RATE: 75 BPM

## 2025-07-21 DIAGNOSIS — R60.0 BILATERAL LOWER EXTREMITY EDEMA: Primary | ICD-10-CM

## 2025-07-21 DIAGNOSIS — I10 ESSENTIAL HYPERTENSION: ICD-10-CM

## 2025-07-21 PROCEDURE — 1125F AMNT PAIN NOTED PAIN PRSNT: CPT

## 2025-07-21 PROCEDURE — 1159F MED LIST DOCD IN RCRD: CPT

## 2025-07-21 PROCEDURE — 3074F SYST BP LT 130 MM HG: CPT

## 2025-07-21 PROCEDURE — 3078F DIAST BP <80 MM HG: CPT

## 2025-07-21 PROCEDURE — G2211 COMPLEX E/M VISIT ADD ON: HCPCS

## 2025-07-21 PROCEDURE — 99213 OFFICE O/P EST LOW 20 MIN: CPT

## 2025-07-21 PROCEDURE — 1160F RVW MEDS BY RX/DR IN RCRD: CPT

## 2025-07-21 NOTE — ASSESSMENT & PLAN NOTE
Hypertension is stable and controlled  Continue current treatment regimen.  Dietary sodium restriction.  Weight loss.  Regular aerobic exercise.  Ambulatory blood pressure monitoring.  Blood pressure will be reassessed next scheduled appt.

## 2025-07-21 NOTE — PROGRESS NOTES
Chief Complaint  Leg Swelling (Both legs swelling and red x 2ish months. Pt states skin is tender but not painful.)    Subjective        Julee King presents to [facility]    Leg Swelling  Symptoms: no abdominal pain, no anorexia, no joint pain, no change in stool, no chest pain, no congestion, no cough, no diaphoresis, no fatigue, no fever, no headaches, no joint swelling, no myalgias, no nausea, no neck pain, no numbness, no rash, no sore throat, no swollen glands, no vertigo, no visual change, no vomiting and no weakness      SWELLING IN LEG  Symptoms are: recurrent.   Onset was at an unknown time.   Symptoms occur: daily.  Pertinent negative symptoms include no abdominal pain, no anorexia, no joint pain, no change in stool, no chest pain, no congestion, no cough, no diaphoresis, no fatigue, no fever, no headaches, no joint swelling, no myalgias, no nausea, no neck pain, no numbness, no rash, no sore throat, no swollen glands, no vertigo, no visual change, no vomiting and no weakness.   Treatment and/or Medications comments include: FUROSEMIDE          The patient presents for evaluation of bilateral leg swelling.    She has been experiencing persistent swelling in both legs, with the left leg being more affected than the right. There is no report of any new changes or skin itching. She does not experience pain while walking but recalls an incident on a cruise ship where her leg gave way, possibly due to her artificial knees. Her daughter, a nurse, noticed some tenderness in her leg upon examination. She reports not walking as much as she should and spending a lot of time sitting. She is currently taking Lasix 20 mg daily and has a blood pressure machine at home for monitoring. She does not use compression stockings.    She also reports constant shortness of breath but has no new chest pain. She is under the care of a cardiologist and has a scheduled appointment in  "09/2025.    PAST SURGICAL HISTORY:  - Artificial knees    Objective   Vital Signs:  /70 (BP Location: Left arm, Patient Position: Sitting, Cuff Size: Large Adult)   Pulse 75   Temp 97.5 °F (36.4 °C) (Temporal)   Ht 152.4 cm (60\")   Wt 121 kg (267 lb 6.4 oz)   SpO2 96%   BMI 52.22 kg/m²   Estimated body mass index is 52.22 kg/m² as calculated from the following:    Height as of this encounter: 152.4 cm (60\").    Weight as of this encounter: 121 kg (267 lb 6.4 oz).            Physical Exam  Constitutional:       Appearance: Normal appearance.   HENT:      Head: Normocephalic.   Eyes:      Conjunctiva/sclera: Conjunctivae normal.      Pupils: Pupils are equal, round, and reactive to light.   Cardiovascular:      Rate and Rhythm: Normal rate and regular rhythm.      Pulses: Normal pulses.      Heart sounds: Normal heart sounds.   Pulmonary:      Effort: Pulmonary effort is normal.      Breath sounds: Normal breath sounds.   Musculoskeletal:      Right lower leg: Swelling and tenderness present. 1+ Edema present.      Left lower leg: Swelling and tenderness present. No lacerations. 3+ Edema present.   Skin:     General: Skin is warm.   Neurological:      General: No focal deficit present.      Mental Status: She is alert and oriented to person, place, and time.   Psychiatric:         Mood and Affect: Mood normal.         Behavior: Behavior normal.            Result Review :  The following data was reviewed by: MANDIE Pemberton on 07/21/2025:  Common labs          4/30/2025    15:12 5/27/2025    11:49 7/15/2025    11:08   Common Labs   Glucose  116  124    BUN  19  17.0    Creatinine  0.72  0.93    Sodium  146  145    Potassium  4.7  4.1    Chloride  110  111    Calcium  8.8  8.7    Albumin  3.7  4.0    Total Bilirubin  0.5  0.4    Alkaline Phosphatase  141  133    AST (SGOT)  16  16    ALT (SGPT)  14  13    WBC 8.81  6.4  6.89    Hemoglobin 9.1  10.9  12.2    Hematocrit 33.3  39.9  41.6    Platelets 309  " 214  184    Total Cholesterol  116     Triglycerides  79     HDL Cholesterol  36     LDL Cholesterol   64     Hemoglobin A1C  5.7       Data reviewed : labs           Assessment and Plan   Diagnoses and all orders for this visit:    1. Bilateral lower extremity edema (Primary)  -     Duplex Venous Lower Extremity - Bilateral CAR; Future  -     Comprehensive metabolic panel; Future    2. Essential hypertension  Assessment & Plan:  Hypertension is stable and controlled  Continue current treatment regimen.  Dietary sodium restriction.  Weight loss.  Regular aerobic exercise.  Ambulatory blood pressure monitoring.  Blood pressure will be reassessed next scheduled appt.             1. Bilateral leg swelling.  - The patient's weight remains stable, which is a positive sign.  - Potassium levels were within the normal range during the last visit on 06/15/2025, and kidney function is satisfactory. Blood pressure readings are also within the normal range.  - The possibility of thrombophlebitis was discussed, given that the swelling has been present for several months and does not appear to be an acute blood clot.  - An ultrasound of both legs will be ordered to assess venous function. She was instructed to double her Lasix dosage to 20 mg in the morning and 20 mg in the early afternoon for a duration of 3 days. A lab-only appointment will be scheduled later this week to monitor her potassium levels. She was also advised to monitor her blood pressure at home and to sit down if she experiences dizziness while on Lasix. If necessary, additional Lasix can be prescribed.    2. Shortness of breath.  - Reports experiencing shortness of breath all the time.  - Blood pressure looks great, and no new chest pain or shortness of breath reported.  - Currently following up with a cardiologist and has an appointment scheduled in 09/2025.  - No new interventions required at this time.    3. Hypertension  -Well controlled at  120/70.  -Continue to follow with Cardiology as scheduled.  -Monitor BP while on Lasix therapy.           Follow Up   No follow-ups on file.  Patient was given instructions and counseling regarding her condition or for health maintenance advice. Please see specific information pulled into the AVS if appropriate.     Patient or patient representative verbalized consent for the use of Ambient Listening during the visit with  MANDIE Pemberton for chart documentation. 7/21/2025  11:18 EDT

## 2025-07-28 ENCOUNTER — RESULTS FOLLOW-UP (OUTPATIENT)
Dept: FAMILY MEDICINE CLINIC | Facility: CLINIC | Age: 80
End: 2025-07-28
Payer: MEDICARE

## 2025-08-05 RX ORDER — LOSARTAN POTASSIUM 100 MG/1
100 TABLET ORAL NIGHTLY
Qty: 90 TABLET | Refills: 3 | Status: SHIPPED | OUTPATIENT
Start: 2025-08-05

## 2025-08-11 ENCOUNTER — HOSPITAL ENCOUNTER (OUTPATIENT)
Dept: CARDIOLOGY | Facility: HOSPITAL | Age: 80
Discharge: HOME | End: 2025-08-11
Payer: MEDICARE

## 2025-08-11 DIAGNOSIS — R60.0 BILATERAL LOWER EXTREMITY EDEMA: ICD-10-CM

## 2025-08-11 LAB

## 2025-08-11 PROCEDURE — 93970 EXTREMITY STUDY: CPT

## 2025-08-11 PROCEDURE — 93970 EXTREMITY STUDY: CPT | Performed by: SURGERY

## 2025-08-14 ENCOUNTER — TELEPHONE (OUTPATIENT)
Age: 80
End: 2025-08-14
Payer: MEDICARE

## 2025-08-14 DIAGNOSIS — R60.0 BILATERAL LOWER EXTREMITY EDEMA: Primary | ICD-10-CM

## 2025-08-20 ENCOUNTER — OFFICE VISIT (OUTPATIENT)
Age: 80
End: 2025-08-20
Payer: MEDICARE

## 2025-08-20 VITALS
HEART RATE: 61 BPM | BODY MASS INDEX: 52.34 KG/M2 | SYSTOLIC BLOOD PRESSURE: 109 MMHG | WEIGHT: 266.6 LBS | DIASTOLIC BLOOD PRESSURE: 65 MMHG | HEIGHT: 60 IN

## 2025-08-20 DIAGNOSIS — E66.01 MORBID OBESITY: ICD-10-CM

## 2025-08-20 DIAGNOSIS — I87.2 VENOUS (PERIPHERAL) INSUFFICIENCY: Primary | ICD-10-CM

## 2025-08-20 DIAGNOSIS — I89.0 LYMPHEDEMA: ICD-10-CM

## 2025-08-22 DIAGNOSIS — K29.70 GASTRITIS, PRESENCE OF BLEEDING UNSPECIFIED, UNSPECIFIED CHRONICITY, UNSPECIFIED GASTRITIS TYPE: ICD-10-CM

## 2025-08-22 RX ORDER — OMEPRAZOLE 40 MG/1
40 CAPSULE, DELAYED RELEASE ORAL DAILY
Qty: 90 CAPSULE | Refills: 0 | OUTPATIENT
Start: 2025-08-22

## 2025-08-25 RX ORDER — ATORVASTATIN CALCIUM 40 MG/1
40 TABLET, FILM COATED ORAL DAILY
Qty: 90 TABLET | Refills: 1 | Status: SHIPPED | OUTPATIENT
Start: 2025-08-25

## 2025-08-29 DIAGNOSIS — E53.8 VITAMIN B12 DEFICIENCY: Primary | ICD-10-CM

## 2025-08-29 DIAGNOSIS — E61.1 IRON DEFICIENCY: ICD-10-CM

## (undated) DEVICE — GW EMR FIX EXCHG J STD .035 3MM 260CM

## (undated) DEVICE — CATH DIAG IMPULSE IMT 5F 100CM

## (undated) DEVICE — SOL IRR H2O BO 1000ML STRL

## (undated) DEVICE — PK CATH CARD 40

## (undated) DEVICE — BW-412T DISP COMBO CLEANING BRUSH: Brand: SINGLE USE COMBINATION CLEANING BRUSH

## (undated) DEVICE — APC PROBE 2200 A, SINGLE USE OD 2.3MM/6.9FR; L. 2.2M/7.2FT: Brand: ERBE

## (undated) DEVICE — GLIDESHEATH BASIC HYDROPHILIC COATED INTRODUCER SHEATH: Brand: GLIDESHEATH

## (undated) DEVICE — FR5 INFINITI MULTIPAC: Brand: INFINITI

## (undated) DEVICE — THE TORRENT IRRIGATION SCOPE CONNECTOR IS USED WITH THE TORRENT IRRIGATION TUBING TO PROVIDE IRRIGATION FLUIDS SUCH AS STERILE WATER DURING GASTROINTESTINAL ENDOSCOPIC PROCEDURES WHEN USED IN CONJUNCTION WITH AN IRRIGATION PUMP (OR ELECTROSURGICAL UNIT).: Brand: TORRENT

## (undated) DEVICE — THE BITE BLOCK MAXI, LATEX FREE STRAP IS USED TO PROTECT THE ENDOSCOPE INSERTION TUBE FROM BEING BITTEN BY THE PATIENT.

## (undated) DEVICE — CATH DIAG IMPULSE AL1 5F 100CM

## (undated) DEVICE — FRCP BX RADJAW4 NDL 2.8 240CM LG OG BX40

## (undated) DEVICE — LINER SURG CANSTR SXN S/RIGD 1500CC

## (undated) DEVICE — CANNULA,ADULT,SOFT-TOUCH,7TUBE,SC: Brand: MEDLINE

## (undated) DEVICE — KT MANIFLD CARDIAC

## (undated) DEVICE — TUBING, SUCTION, 1/4" X 10', STRAIGHT: Brand: MEDLINE

## (undated) DEVICE — ERBE NESSY®PLATE 170 SPLIT; 168CM²; CABLE 3M: Brand: ERBE

## (undated) DEVICE — Device

## (undated) DEVICE — SYR LL W/SCALE/MARK 3ML STRL

## (undated) DEVICE — VIAL FORMALIN CAP 10P 40ML

## (undated) DEVICE — TBG 02 CRUSH RESIST LF CLR 7FT

## (undated) DEVICE — Device: Brand: DEFENDO AIR/WATER/SUCTION AND BIOPSY VALVE

## (undated) DEVICE — BITEBLOCK OMNI BLOC

## (undated) DEVICE — KT ORCA ORCAPOD DISP STRL

## (undated) DEVICE — ADAPT CLN BIOGUARD AIR/H2O DISP